# Patient Record
Sex: MALE | Race: WHITE | Employment: OTHER | URBAN - METROPOLITAN AREA
[De-identification: names, ages, dates, MRNs, and addresses within clinical notes are randomized per-mention and may not be internally consistent; named-entity substitution may affect disease eponyms.]

---

## 2017-01-01 ENCOUNTER — APPOINTMENT (OUTPATIENT)
Dept: GENERAL RADIOLOGY | Age: 67
DRG: 306 | End: 2017-01-01
Attending: THORACIC SURGERY (CARDIOTHORACIC VASCULAR SURGERY)
Payer: MEDICARE

## 2017-01-01 ENCOUNTER — HOSPITAL ENCOUNTER (INPATIENT)
Age: 67
LOS: 7 days | Discharge: HOME | DRG: 306 | End: 2017-10-06
Attending: INTERNAL MEDICINE | Admitting: INTERNAL MEDICINE
Payer: MEDICARE

## 2017-01-01 ENCOUNTER — APPOINTMENT (OUTPATIENT)
Dept: CT IMAGING | Age: 67
DRG: 306 | End: 2017-01-01
Attending: THORACIC SURGERY (CARDIOTHORACIC VASCULAR SURGERY)
Payer: MEDICARE

## 2017-01-01 ENCOUNTER — HOSPITAL ENCOUNTER (OUTPATIENT)
Dept: CARDIAC CATH/INVASIVE PROCEDURES | Age: 67
Setting detail: OBSERVATION
Discharge: ANOTHER HOSPITAL | End: 2017-09-28
Attending: INTERNAL MEDICINE | Admitting: INTERNAL MEDICINE
Payer: MEDICARE

## 2017-01-01 VITALS
TEMPERATURE: 98.9 F | RESPIRATION RATE: 20 BRPM | DIASTOLIC BLOOD PRESSURE: 99 MMHG | HEART RATE: 98 BPM | SYSTOLIC BLOOD PRESSURE: 141 MMHG | WEIGHT: 315 LBS | HEIGHT: 71 IN | BODY MASS INDEX: 44.1 KG/M2 | OXYGEN SATURATION: 98 %

## 2017-01-01 VITALS
OXYGEN SATURATION: 91 % | TEMPERATURE: 98.3 F | DIASTOLIC BLOOD PRESSURE: 81 MMHG | BODY MASS INDEX: 44.1 KG/M2 | WEIGHT: 315 LBS | HEIGHT: 71 IN | HEART RATE: 88 BPM | SYSTOLIC BLOOD PRESSURE: 116 MMHG | RESPIRATION RATE: 20 BRPM

## 2017-01-01 LAB
ACT BLD: 212 SECS (ref 79–138)
ALBUMIN SERPL-MCNC: 3.5 G/DL (ref 3.4–5)
ALBUMIN/GLOB SERPL: 1.2 {RATIO} (ref 0.8–1.7)
ALP SERPL-CCNC: 96 U/L (ref 45–117)
ALT SERPL-CCNC: 34 U/L (ref 16–61)
ANION GAP SERPL CALC-SCNC: 6 MMOL/L (ref 3–18)
ANION GAP SERPL CALC-SCNC: 6 MMOL/L (ref 3–18)
ANION GAP SERPL CALC-SCNC: 7 MMOL/L (ref 3–18)
ANION GAP SERPL CALC-SCNC: 7 MMOL/L (ref 3–18)
ANION GAP SERPL CALC-SCNC: 8 MMOL/L (ref 3–18)
ANION GAP SERPL CALC-SCNC: 9 MMOL/L (ref 3–18)
APPEARANCE UR: CLEAR
ARTERIAL PATENCY WRIST A: YES
AST SERPL-CCNC: 38 U/L (ref 15–37)
ATRIAL RATE: 91 BPM
BASE EXCESS BLD CALC-SCNC: 6 MMOL/L
BASOPHILS # BLD: 0 K/UL (ref 0–0.06)
BASOPHILS # BLD: 0 K/UL (ref 0–0.1)
BASOPHILS NFR BLD: 0 % (ref 0–2)
BASOPHILS NFR BLD: 0 % (ref 0–2)
BASOPHILS NFR BLD: 1 % (ref 0–2)
BASOPHILS NFR BLD: 1 % (ref 0–2)
BDY SITE: ABNORMAL
BILIRUB SERPL-MCNC: 3.3 MG/DL (ref 0.2–1)
BILIRUB UR QL: NEGATIVE
BODY TEMPERATURE: 35.9
BUN SERPL-MCNC: 19 MG/DL (ref 7–18)
BUN SERPL-MCNC: 20 MG/DL (ref 7–18)
BUN SERPL-MCNC: 22 MG/DL (ref 7–18)
BUN SERPL-MCNC: 22 MG/DL (ref 7–18)
BUN SERPL-MCNC: 23 MG/DL (ref 7–18)
BUN SERPL-MCNC: 24 MG/DL (ref 7–18)
BUN/CREAT SERPL: 14 (ref 12–20)
BUN/CREAT SERPL: 18 (ref 12–20)
BUN/CREAT SERPL: 19 (ref 12–20)
BUN/CREAT SERPL: 21 (ref 12–20)
BUN/CREAT SERPL: 22 (ref 12–20)
BUN/CREAT SERPL: 22 (ref 12–20)
BUN/CREAT SERPL: 23 (ref 12–20)
BUN/CREAT SERPL: 23 (ref 12–20)
CALCIUM SERPL-MCNC: 8.4 MG/DL (ref 8.5–10.1)
CALCIUM SERPL-MCNC: 8.5 MG/DL (ref 8.5–10.1)
CALCIUM SERPL-MCNC: 8.8 MG/DL (ref 8.5–10.1)
CALCIUM SERPL-MCNC: 8.9 MG/DL (ref 8.5–10.1)
CALCIUM SERPL-MCNC: 8.9 MG/DL (ref 8.5–10.1)
CALCIUM SERPL-MCNC: 9 MG/DL (ref 8.5–10.1)
CALCIUM SERPL-MCNC: 9.1 MG/DL (ref 8.5–10.1)
CALCIUM SERPL-MCNC: 9.2 MG/DL (ref 8.5–10.1)
CALCULATED R AXIS, ECG10: -7 DEGREES
CALCULATED T AXIS, ECG11: 110 DEGREES
CHLORIDE SERPL-SCNC: 100 MMOL/L (ref 100–108)
CHLORIDE SERPL-SCNC: 103 MMOL/L (ref 100–108)
CHLORIDE SERPL-SCNC: 97 MMOL/L (ref 100–108)
CHLORIDE SERPL-SCNC: 99 MMOL/L (ref 100–108)
CHLORIDE SERPL-SCNC: 99 MMOL/L (ref 100–108)
CO2 SERPL-SCNC: 29 MMOL/L (ref 21–32)
CO2 SERPL-SCNC: 30 MMOL/L (ref 21–32)
CO2 SERPL-SCNC: 30 MMOL/L (ref 21–32)
CO2 SERPL-SCNC: 31 MMOL/L (ref 21–32)
CO2 SERPL-SCNC: 31 MMOL/L (ref 21–32)
CO2 SERPL-SCNC: 32 MMOL/L (ref 21–32)
COLOR UR: ABNORMAL
CREAT SERPL-MCNC: 0.96 MG/DL (ref 0.6–1.3)
CREAT SERPL-MCNC: 0.98 MG/DL (ref 0.6–1.3)
CREAT SERPL-MCNC: 1.01 MG/DL (ref 0.6–1.3)
CREAT SERPL-MCNC: 1.04 MG/DL (ref 0.6–1.3)
CREAT SERPL-MCNC: 1.05 MG/DL (ref 0.6–1.3)
CREAT SERPL-MCNC: 1.07 MG/DL (ref 0.6–1.3)
CREAT SERPL-MCNC: 1.3 MG/DL (ref 0.6–1.3)
CREAT SERPL-MCNC: 1.4 MG/DL (ref 0.6–1.3)
DIAGNOSIS, 93000: NORMAL
DIFFERENTIAL METHOD BLD: ABNORMAL
EOSINOPHIL # BLD: 0.1 K/UL (ref 0–0.4)
EOSINOPHIL # BLD: 0.2 K/UL (ref 0–0.4)
EOSINOPHIL NFR BLD: 1 % (ref 0–5)
EOSINOPHIL NFR BLD: 2 % (ref 0–5)
EPITH CASTS URNS QL MICRO: NORMAL /LPF (ref 0–5)
ERYTHROCYTE [DISTWIDTH] IN BLOOD BY AUTOMATED COUNT: 14.4 % (ref 11.6–14.5)
ERYTHROCYTE [DISTWIDTH] IN BLOOD BY AUTOMATED COUNT: 14.6 % (ref 11.6–14.5)
ERYTHROCYTE [DISTWIDTH] IN BLOOD BY AUTOMATED COUNT: 14.7 % (ref 11.6–14.5)
ERYTHROCYTE [DISTWIDTH] IN BLOOD BY AUTOMATED COUNT: 14.7 % (ref 11.6–14.5)
ERYTHROCYTE [DISTWIDTH] IN BLOOD BY AUTOMATED COUNT: 15.1 % (ref 11.6–14.5)
EST. AVERAGE GLUCOSE BLD GHB EST-MCNC: 131 MG/DL
GAS FLOW.O2 O2 DELIVERY SYS: ABNORMAL L/MIN
GLOBULIN SER CALC-MCNC: 2.9 G/DL (ref 2–4)
GLUCOSE BLD STRIP.AUTO-MCNC: 100 MG/DL (ref 70–110)
GLUCOSE BLD STRIP.AUTO-MCNC: 102 MG/DL (ref 70–110)
GLUCOSE BLD STRIP.AUTO-MCNC: 105 MG/DL (ref 70–110)
GLUCOSE BLD STRIP.AUTO-MCNC: 106 MG/DL (ref 70–110)
GLUCOSE BLD STRIP.AUTO-MCNC: 106 MG/DL (ref 70–110)
GLUCOSE BLD STRIP.AUTO-MCNC: 108 MG/DL (ref 70–110)
GLUCOSE BLD STRIP.AUTO-MCNC: 109 MG/DL (ref 70–110)
GLUCOSE BLD STRIP.AUTO-MCNC: 111 MG/DL (ref 70–110)
GLUCOSE BLD STRIP.AUTO-MCNC: 112 MG/DL (ref 70–110)
GLUCOSE BLD STRIP.AUTO-MCNC: 116 MG/DL (ref 70–110)
GLUCOSE BLD STRIP.AUTO-MCNC: 116 MG/DL (ref 70–110)
GLUCOSE BLD STRIP.AUTO-MCNC: 118 MG/DL (ref 70–110)
GLUCOSE BLD STRIP.AUTO-MCNC: 119 MG/DL (ref 70–110)
GLUCOSE BLD STRIP.AUTO-MCNC: 121 MG/DL (ref 70–110)
GLUCOSE BLD STRIP.AUTO-MCNC: 122 MG/DL (ref 70–110)
GLUCOSE BLD STRIP.AUTO-MCNC: 123 MG/DL (ref 70–110)
GLUCOSE BLD STRIP.AUTO-MCNC: 123 MG/DL (ref 70–110)
GLUCOSE BLD STRIP.AUTO-MCNC: 128 MG/DL (ref 70–110)
GLUCOSE BLD STRIP.AUTO-MCNC: 128 MG/DL (ref 70–110)
GLUCOSE BLD STRIP.AUTO-MCNC: 131 MG/DL (ref 70–110)
GLUCOSE BLD STRIP.AUTO-MCNC: 132 MG/DL (ref 70–110)
GLUCOSE BLD STRIP.AUTO-MCNC: 136 MG/DL (ref 70–110)
GLUCOSE BLD STRIP.AUTO-MCNC: 136 MG/DL (ref 70–110)
GLUCOSE BLD STRIP.AUTO-MCNC: 137 MG/DL (ref 70–110)
GLUCOSE BLD STRIP.AUTO-MCNC: 137 MG/DL (ref 70–110)
GLUCOSE BLD STRIP.AUTO-MCNC: 151 MG/DL (ref 70–110)
GLUCOSE BLD STRIP.AUTO-MCNC: 171 MG/DL (ref 70–110)
GLUCOSE BLD STRIP.AUTO-MCNC: 87 MG/DL (ref 70–110)
GLUCOSE BLD STRIP.AUTO-MCNC: 96 MG/DL (ref 70–110)
GLUCOSE BLD STRIP.AUTO-MCNC: 98 MG/DL (ref 70–110)
GLUCOSE BLD STRIP.AUTO-MCNC: 99 MG/DL (ref 70–110)
GLUCOSE SERPL-MCNC: 102 MG/DL (ref 74–99)
GLUCOSE SERPL-MCNC: 105 MG/DL (ref 74–99)
GLUCOSE SERPL-MCNC: 112 MG/DL (ref 74–99)
GLUCOSE SERPL-MCNC: 113 MG/DL (ref 74–99)
GLUCOSE SERPL-MCNC: 127 MG/DL (ref 74–99)
GLUCOSE SERPL-MCNC: 137 MG/DL (ref 74–99)
GLUCOSE SERPL-MCNC: 150 MG/DL (ref 74–99)
GLUCOSE SERPL-MCNC: 94 MG/DL (ref 74–99)
GLUCOSE UR STRIP.AUTO-MCNC: NEGATIVE MG/DL
HBA1C MFR BLD: 6.2 % (ref 4.2–5.6)
HBA1C MFR BLD: 6.2 % (ref 4.2–5.6)
HCO3 BLD-SCNC: 30 MMOL/L (ref 22–26)
HCT VFR BLD AUTO: 40.6 % (ref 36–48)
HCT VFR BLD AUTO: 40.9 % (ref 36–48)
HCT VFR BLD AUTO: 41.1 % (ref 36–48)
HCT VFR BLD AUTO: 43.8 % (ref 36–48)
HCT VFR BLD AUTO: 44.4 % (ref 36–48)
HGB BLD-MCNC: 13.1 G/DL (ref 13–16)
HGB BLD-MCNC: 13.5 G/DL (ref 13–16)
HGB BLD-MCNC: 13.7 G/DL (ref 13–16)
HGB BLD-MCNC: 14.3 G/DL (ref 13–16)
HGB BLD-MCNC: 14.8 G/DL (ref 13–16)
HGB UR QL STRIP: ABNORMAL
INR PPP: 1.7 (ref 0.8–1.2)
KETONES UR QL STRIP.AUTO: NEGATIVE MG/DL
LEUKOCYTE ESTERASE UR QL STRIP.AUTO: ABNORMAL
LYMPHOCYTES # BLD: 1.5 K/UL (ref 0.9–3.6)
LYMPHOCYTES # BLD: 1.5 K/UL (ref 0.9–3.6)
LYMPHOCYTES # BLD: 1.7 K/UL (ref 0.9–3.6)
LYMPHOCYTES # BLD: 1.7 K/UL (ref 0.9–3.6)
LYMPHOCYTES NFR BLD: 21 % (ref 21–52)
LYMPHOCYTES NFR BLD: 22 % (ref 21–52)
LYMPHOCYTES NFR BLD: 23 % (ref 21–52)
LYMPHOCYTES NFR BLD: 26 % (ref 21–52)
MCH RBC QN AUTO: 32 PG (ref 24–34)
MCH RBC QN AUTO: 32.5 PG (ref 24–34)
MCH RBC QN AUTO: 32.6 PG (ref 24–34)
MCH RBC QN AUTO: 32.6 PG (ref 24–34)
MCH RBC QN AUTO: 33 PG (ref 24–34)
MCHC RBC AUTO-ENTMCNC: 32.2 G/DL (ref 31–37)
MCHC RBC AUTO-ENTMCNC: 32.3 G/DL (ref 31–37)
MCHC RBC AUTO-ENTMCNC: 33 G/DL (ref 31–37)
MCHC RBC AUTO-ENTMCNC: 33.3 G/DL (ref 31–37)
MCHC RBC AUTO-ENTMCNC: 33.8 G/DL (ref 31–37)
MCV RBC AUTO: 101.1 FL (ref 74–97)
MCV RBC AUTO: 96.3 FL (ref 74–97)
MCV RBC AUTO: 98.8 FL (ref 74–97)
MCV RBC AUTO: 99 FL (ref 74–97)
MCV RBC AUTO: 99 FL (ref 74–97)
MONOCYTES # BLD: 0.6 K/UL (ref 0.05–1.2)
MONOCYTES # BLD: 0.9 K/UL (ref 0.05–1.2)
MONOCYTES # BLD: 1 K/UL (ref 0.05–1.2)
MONOCYTES # BLD: 1 K/UL (ref 0.05–1.2)
MONOCYTES NFR BLD: 10 % (ref 3–10)
MONOCYTES NFR BLD: 12 % (ref 3–10)
MONOCYTES NFR BLD: 13 % (ref 3–10)
MONOCYTES NFR BLD: 15 % (ref 3–10)
NEUTS SEG # BLD: 3.5 K/UL (ref 1.8–8)
NEUTS SEG # BLD: 4.4 K/UL (ref 1.8–8)
NEUTS SEG # BLD: 4.4 K/UL (ref 1.8–8)
NEUTS SEG # BLD: 4.7 K/UL (ref 1.8–8)
NEUTS SEG NFR BLD: 61 % (ref 40–73)
NEUTS SEG NFR BLD: 61 % (ref 40–73)
NEUTS SEG NFR BLD: 63 % (ref 40–73)
NEUTS SEG NFR BLD: 64 % (ref 40–73)
NITRITE UR QL STRIP.AUTO: NEGATIVE
O2/TOTAL GAS SETTING VFR VENT: 21 %
PCO2 BLD: 39.9 MMHG (ref 35–45)
PH BLD: 7.48 [PH] (ref 7.35–7.45)
PH UR STRIP: 6.5 [PH] (ref 5–8)
PLATELET # BLD AUTO: 100 K/UL (ref 135–420)
PLATELET # BLD AUTO: 102 K/UL (ref 135–420)
PLATELET # BLD AUTO: 113 K/UL (ref 135–420)
PLATELET # BLD AUTO: 130 K/UL (ref 135–420)
PLATELET # BLD AUTO: 96 K/UL (ref 135–420)
PMV BLD AUTO: 11.1 FL (ref 9.2–11.8)
PMV BLD AUTO: 11.3 FL (ref 9.2–11.8)
PMV BLD AUTO: 11.7 FL (ref 9.2–11.8)
PMV BLD AUTO: 11.9 FL (ref 9.2–11.8)
PMV BLD AUTO: 12.4 FL (ref 9.2–11.8)
PO2 BLD: 61 MMHG (ref 80–100)
POTASSIUM SERPL-SCNC: 3.6 MMOL/L (ref 3.5–5.5)
POTASSIUM SERPL-SCNC: 3.9 MMOL/L (ref 3.5–5.5)
POTASSIUM SERPL-SCNC: 4 MMOL/L (ref 3.5–5.5)
POTASSIUM SERPL-SCNC: 4.2 MMOL/L (ref 3.5–5.5)
POTASSIUM SERPL-SCNC: 4.7 MMOL/L (ref 3.5–5.5)
PROT SERPL-MCNC: 6.4 G/DL (ref 6.4–8.2)
PROT UR STRIP-MCNC: 100 MG/DL
PROTHROMBIN TIME: 19.2 SEC (ref 11.5–15.2)
Q-T INTERVAL, ECG07: 364 MS
QRS DURATION, ECG06: 108 MS
QTC CALCULATION (BEZET), ECG08: 455 MS
RBC # BLD AUTO: 4.1 M/UL (ref 4.7–5.5)
RBC # BLD AUTO: 4.14 M/UL (ref 4.7–5.5)
RBC # BLD AUTO: 4.15 M/UL (ref 4.7–5.5)
RBC # BLD AUTO: 4.39 M/UL (ref 4.7–5.5)
RBC # BLD AUTO: 4.55 M/UL (ref 4.7–5.5)
RBC #/AREA URNS HPF: NORMAL /HPF (ref 0–5)
SAO2 % BLD: 94 % (ref 92–97)
SERVICE CMNT-IMP: ABNORMAL
SODIUM SERPL-SCNC: 136 MMOL/L (ref 136–145)
SODIUM SERPL-SCNC: 137 MMOL/L (ref 136–145)
SODIUM SERPL-SCNC: 138 MMOL/L (ref 136–145)
SODIUM SERPL-SCNC: 139 MMOL/L (ref 136–145)
SODIUM SERPL-SCNC: 140 MMOL/L (ref 136–145)
SODIUM SERPL-SCNC: 140 MMOL/L (ref 136–145)
SODIUM SERPL-SCNC: 141 MMOL/L (ref 136–145)
SODIUM SERPL-SCNC: 142 MMOL/L (ref 136–145)
SP GR UR REFRACTOMETRY: 1.02 (ref 1–1.03)
SPECIMEN TYPE: ABNORMAL
UROBILINOGEN UR QL STRIP.AUTO: 2 EU/DL (ref 0.2–1)
VENTRICULAR RATE, ECG03: 94 BPM
WBC # BLD AUTO: 5.7 K/UL (ref 4.6–13.2)
WBC # BLD AUTO: 6.2 K/UL (ref 4.6–13.2)
WBC # BLD AUTO: 7.1 K/UL (ref 4.6–13.2)
WBC # BLD AUTO: 7.2 K/UL (ref 4.6–13.2)
WBC # BLD AUTO: 7.4 K/UL (ref 4.6–13.2)
WBC URNS QL MICRO: NORMAL /HPF (ref 0–4)

## 2017-01-01 PROCEDURE — 77030004534 CARDIAC CATHETERIZATION

## 2017-01-01 PROCEDURE — 65660000000 HC RM CCU STEPDOWN

## 2017-01-01 PROCEDURE — 74011000250 HC RX REV CODE- 250

## 2017-01-01 PROCEDURE — 36600 WITHDRAWAL OF ARTERIAL BLOOD: CPT

## 2017-01-01 PROCEDURE — C8929 TTE W OR WO FOL WCON,DOPPLER: HCPCS

## 2017-01-01 PROCEDURE — 94640 AIRWAY INHALATION TREATMENT: CPT

## 2017-01-01 PROCEDURE — 65270000029 HC RM PRIVATE

## 2017-01-01 PROCEDURE — 74011250637 HC RX REV CODE- 250/637: Performed by: THORACIC SURGERY (CARDIOTHORACIC VASCULAR SURGERY)

## 2017-01-01 PROCEDURE — 82962 GLUCOSE BLOOD TEST: CPT

## 2017-01-01 PROCEDURE — 94729 DIFFUSING CAPACITY: CPT

## 2017-01-01 PROCEDURE — 74011250636 HC RX REV CODE- 250/636: Performed by: INTERNAL MEDICINE

## 2017-01-01 PROCEDURE — 94060 EVALUATION OF WHEEZING: CPT

## 2017-01-01 PROCEDURE — 74011636637 HC RX REV CODE- 636/637: Performed by: INTERNAL MEDICINE

## 2017-01-01 PROCEDURE — 74011250637 HC RX REV CODE- 250/637: Performed by: INTERNAL MEDICINE

## 2017-01-01 PROCEDURE — 93880 EXTRACRANIAL BILAT STUDY: CPT

## 2017-01-01 PROCEDURE — 74011250637 HC RX REV CODE- 250/637: Performed by: FAMILY MEDICINE

## 2017-01-01 PROCEDURE — 36415 COLL VENOUS BLD VENIPUNCTURE: CPT | Performed by: INTERNAL MEDICINE

## 2017-01-01 PROCEDURE — 80048 BASIC METABOLIC PNL TOTAL CA: CPT | Performed by: INTERNAL MEDICINE

## 2017-01-01 PROCEDURE — 80048 BASIC METABOLIC PNL TOTAL CA: CPT | Performed by: THORACIC SURGERY (CARDIOTHORACIC VASCULAR SURGERY)

## 2017-01-01 PROCEDURE — 99218 HC RM OBSERVATION: CPT

## 2017-01-01 PROCEDURE — 85025 COMPLETE CBC W/AUTO DIFF WBC: CPT | Performed by: FAMILY MEDICINE

## 2017-01-01 PROCEDURE — 80048 BASIC METABOLIC PNL TOTAL CA: CPT | Performed by: FAMILY MEDICINE

## 2017-01-01 PROCEDURE — 74011250636 HC RX REV CODE- 250/636

## 2017-01-01 PROCEDURE — 94727 GAS DIL/WSHOT DETER LNG VOL: CPT

## 2017-01-01 PROCEDURE — 85027 COMPLETE CBC AUTOMATED: CPT | Performed by: INTERNAL MEDICINE

## 2017-01-01 PROCEDURE — 93970 EXTREMITY STUDY: CPT

## 2017-01-01 PROCEDURE — 81001 URINALYSIS AUTO W/SCOPE: CPT | Performed by: INTERNAL MEDICINE

## 2017-01-01 PROCEDURE — 74011000250 HC RX REV CODE- 250: Performed by: INTERNAL MEDICINE

## 2017-01-01 PROCEDURE — 83036 HEMOGLOBIN GLYCOSYLATED A1C: CPT | Performed by: INTERNAL MEDICINE

## 2017-01-01 PROCEDURE — 74011636320 HC RX REV CODE- 636/320: Performed by: INTERNAL MEDICINE

## 2017-01-01 PROCEDURE — 82803 BLOOD GASES ANY COMBINATION: CPT

## 2017-01-01 PROCEDURE — 36415 COLL VENOUS BLD VENIPUNCTURE: CPT | Performed by: THORACIC SURGERY (CARDIOTHORACIC VASCULAR SURGERY)

## 2017-01-01 PROCEDURE — 36415 COLL VENOUS BLD VENIPUNCTURE: CPT | Performed by: FAMILY MEDICINE

## 2017-01-01 PROCEDURE — 80053 COMPREHEN METABOLIC PANEL: CPT | Performed by: THORACIC SURGERY (CARDIOTHORACIC VASCULAR SURGERY)

## 2017-01-01 PROCEDURE — 85025 COMPLETE CBC W/AUTO DIFF WBC: CPT | Performed by: INTERNAL MEDICINE

## 2017-01-01 PROCEDURE — 85610 PROTHROMBIN TIME: CPT | Performed by: INTERNAL MEDICINE

## 2017-01-01 PROCEDURE — 71020 XR CHEST PA LAT: CPT

## 2017-01-01 PROCEDURE — 71260 CT THORAX DX C+: CPT

## 2017-01-01 PROCEDURE — 93005 ELECTROCARDIOGRAM TRACING: CPT

## 2017-01-01 PROCEDURE — 83036 HEMOGLOBIN GLYCOSYLATED A1C: CPT | Performed by: THORACIC SURGERY (CARDIOTHORACIC VASCULAR SURGERY)

## 2017-01-01 PROCEDURE — 85347 COAGULATION TIME ACTIVATED: CPT

## 2017-01-01 RX ORDER — HEPARIN SODIUM 200 [USP'U]/100ML
INJECTION, SOLUTION INTRAVENOUS
Status: DISPENSED
Start: 2017-01-01 | End: 2017-01-01

## 2017-01-01 RX ORDER — CARVEDILOL 12.5 MG/1
12.5 TABLET ORAL 2 TIMES DAILY WITH MEALS
Qty: 60 TAB | Refills: 2 | Status: SHIPPED | OUTPATIENT
Start: 2017-01-01

## 2017-01-01 RX ORDER — ASPIRIN 81 MG/1
81 TABLET ORAL DAILY
Status: DISCONTINUED | OUTPATIENT
Start: 2017-01-01 | End: 2017-01-01 | Stop reason: HOSPADM

## 2017-01-01 RX ORDER — ASPIRIN 81 MG/1
81 TABLET ORAL DAILY
Qty: 30 TAB | Refills: 1 | Status: SHIPPED | OUTPATIENT
Start: 2017-01-01

## 2017-01-01 RX ORDER — GUAIFENESIN 100 MG/5ML
81 LIQUID (ML) ORAL DAILY
Status: DISCONTINUED | OUTPATIENT
Start: 2017-01-01 | End: 2017-01-01 | Stop reason: HOSPADM

## 2017-01-01 RX ORDER — GABAPENTIN 600 MG/1
600 TABLET ORAL 3 TIMES DAILY
COMMUNITY

## 2017-01-01 RX ORDER — DEXTROSE 50 % IN WATER (D50W) INTRAVENOUS SYRINGE
25-50 AS NEEDED
Status: DISCONTINUED | OUTPATIENT
Start: 2017-01-01 | End: 2017-01-01 | Stop reason: HOSPADM

## 2017-01-01 RX ORDER — ENOXAPARIN SODIUM 150 MG/ML
145 INJECTION SUBCUTANEOUS EVERY 12 HOURS
Status: DISCONTINUED | OUTPATIENT
Start: 2017-01-01 | End: 2017-01-01 | Stop reason: HOSPADM

## 2017-01-01 RX ORDER — CARVEDILOL 12.5 MG/1
12.5 TABLET ORAL 2 TIMES DAILY WITH MEALS
Status: DISCONTINUED | OUTPATIENT
Start: 2017-01-01 | End: 2017-01-01 | Stop reason: HOSPADM

## 2017-01-01 RX ORDER — FENTANYL CITRATE 50 UG/ML
INJECTION, SOLUTION INTRAMUSCULAR; INTRAVENOUS
Status: COMPLETED
Start: 2017-01-01 | End: 2017-01-01

## 2017-01-01 RX ORDER — HYDRALAZINE HYDROCHLORIDE 20 MG/ML
10 INJECTION INTRAMUSCULAR; INTRAVENOUS
Status: DISCONTINUED | OUTPATIENT
Start: 2017-01-01 | End: 2017-01-01 | Stop reason: HOSPADM

## 2017-01-01 RX ORDER — LIDOCAINE HYDROCHLORIDE 10 MG/ML
INJECTION INFILTRATION; PERINEURAL
Status: COMPLETED
Start: 2017-01-01 | End: 2017-01-01

## 2017-01-01 RX ORDER — WARFARIN SODIUM 5 MG/1
7.5 TABLET ORAL DAILY
COMMUNITY

## 2017-01-01 RX ORDER — FUROSEMIDE 20 MG/1
TABLET ORAL
COMMUNITY
End: 2017-01-01

## 2017-01-01 RX ORDER — TRAMADOL HYDROCHLORIDE 50 MG/1
50 TABLET ORAL
Status: COMPLETED | OUTPATIENT
Start: 2017-01-01 | End: 2017-01-01

## 2017-01-01 RX ORDER — LISINOPRIL 5 MG/1
10 TABLET ORAL DAILY
Status: DISCONTINUED | OUTPATIENT
Start: 2017-01-01 | End: 2017-01-01 | Stop reason: HOSPADM

## 2017-01-01 RX ORDER — HYDRALAZINE HYDROCHLORIDE 20 MG/ML
10-20 INJECTION INTRAMUSCULAR; INTRAVENOUS
Status: DISCONTINUED | OUTPATIENT
Start: 2017-01-01 | End: 2017-01-01 | Stop reason: SDUPTHER

## 2017-01-01 RX ORDER — GABAPENTIN 300 MG/1
600 CAPSULE ORAL 3 TIMES DAILY
Status: DISCONTINUED | OUTPATIENT
Start: 2017-01-01 | End: 2017-01-01

## 2017-01-01 RX ORDER — HEPARIN SODIUM 200 [USP'U]/100ML
INJECTION, SOLUTION INTRAVENOUS
Status: COMPLETED
Start: 2017-01-01 | End: 2017-01-01

## 2017-01-01 RX ORDER — FUROSEMIDE 40 MG/1
40 TABLET ORAL 2 TIMES DAILY
Status: DISCONTINUED | OUTPATIENT
Start: 2017-01-01 | End: 2017-01-01

## 2017-01-01 RX ORDER — TRAMADOL HYDROCHLORIDE 50 MG/1
50 TABLET ORAL
Status: DISCONTINUED | OUTPATIENT
Start: 2017-01-01 | End: 2017-01-01

## 2017-01-01 RX ORDER — HYDRALAZINE HYDROCHLORIDE 20 MG/ML
10-20 INJECTION INTRAMUSCULAR; INTRAVENOUS
Status: DISCONTINUED | OUTPATIENT
Start: 2017-01-01 | End: 2017-01-01 | Stop reason: HOSPADM

## 2017-01-01 RX ORDER — SODIUM CHLORIDE 0.9 % (FLUSH) 0.9 %
5-10 SYRINGE (ML) INJECTION EVERY 8 HOURS
Status: CANCELLED | OUTPATIENT
Start: 2017-01-01

## 2017-01-01 RX ORDER — POTASSIUM CHLORIDE 1.5 G/1.77G
20 POWDER, FOR SOLUTION ORAL
Status: DISCONTINUED | OUTPATIENT
Start: 2017-01-01 | End: 2017-01-01 | Stop reason: HOSPADM

## 2017-01-01 RX ORDER — SODIUM CHLORIDE 0.9 % (FLUSH) 0.9 %
5-10 SYRINGE (ML) INJECTION AS NEEDED
Status: DISCONTINUED | OUTPATIENT
Start: 2017-01-01 | End: 2017-01-01 | Stop reason: HOSPADM

## 2017-01-01 RX ORDER — PRAVASTATIN SODIUM 20 MG/1
40 TABLET ORAL
Status: DISCONTINUED | OUTPATIENT
Start: 2017-01-01 | End: 2017-01-01 | Stop reason: HOSPADM

## 2017-01-01 RX ORDER — FUROSEMIDE 40 MG/1
80 TABLET ORAL 2 TIMES DAILY
Qty: 120 TAB | Refills: 2 | Status: SHIPPED | OUTPATIENT
Start: 2017-01-01 | End: 2018-01-01

## 2017-01-01 RX ORDER — METOPROLOL TARTRATE 5 MG/5ML
INJECTION INTRAVENOUS
Status: COMPLETED
Start: 2017-01-01 | End: 2017-01-01

## 2017-01-01 RX ORDER — FENTANYL CITRATE 50 UG/ML
25-100 INJECTION, SOLUTION INTRAMUSCULAR; INTRAVENOUS
Status: DISCONTINUED | OUTPATIENT
Start: 2017-01-01 | End: 2017-01-01 | Stop reason: HOSPADM

## 2017-01-01 RX ORDER — LORATADINE 10 MG/1
10 TABLET ORAL DAILY
Status: DISCONTINUED | OUTPATIENT
Start: 2017-01-01 | End: 2017-01-01 | Stop reason: HOSPADM

## 2017-01-01 RX ORDER — DOXYCYCLINE HYCLATE 20 MG
20 TABLET ORAL 2 TIMES DAILY
COMMUNITY
End: 2017-01-01

## 2017-01-01 RX ORDER — METOPROLOL TARTRATE 5 MG/5ML
5 INJECTION INTRAVENOUS
Status: DISCONTINUED | OUTPATIENT
Start: 2017-01-01 | End: 2017-01-01 | Stop reason: HOSPADM

## 2017-01-01 RX ORDER — VERAPAMIL HYDROCHLORIDE 2.5 MG/ML
INJECTION, SOLUTION INTRAVENOUS
Status: DISPENSED
Start: 2017-01-01 | End: 2017-01-01

## 2017-01-01 RX ORDER — SODIUM CHLORIDE 9 MG/ML
50 INJECTION, SOLUTION INTRAVENOUS CONTINUOUS
Status: DISCONTINUED | OUTPATIENT
Start: 2017-01-01 | End: 2017-01-01 | Stop reason: HOSPADM

## 2017-01-01 RX ORDER — HYDRALAZINE HYDROCHLORIDE 20 MG/ML
INJECTION INTRAMUSCULAR; INTRAVENOUS
Status: COMPLETED
Start: 2017-01-01 | End: 2017-01-01

## 2017-01-01 RX ORDER — TRAMADOL HYDROCHLORIDE 50 MG/1
50 TABLET ORAL
Status: DISCONTINUED | OUTPATIENT
Start: 2017-01-01 | End: 2017-01-01 | Stop reason: HOSPADM

## 2017-01-01 RX ORDER — LIDOCAINE HYDROCHLORIDE 10 MG/ML
10-30 INJECTION INFILTRATION; PERINEURAL
Status: DISCONTINUED | OUTPATIENT
Start: 2017-01-01 | End: 2017-01-01 | Stop reason: HOSPADM

## 2017-01-01 RX ORDER — CARVEDILOL 6.25 MG/1
6.25 TABLET ORAL 2 TIMES DAILY WITH MEALS
Status: DISCONTINUED | OUTPATIENT
Start: 2017-01-01 | End: 2017-01-01

## 2017-01-01 RX ORDER — HEPARIN SODIUM 200 [USP'U]/100ML
500 INJECTION, SOLUTION INTRAVENOUS
Status: DISCONTINUED | OUTPATIENT
Start: 2017-01-01 | End: 2017-01-01 | Stop reason: HOSPADM

## 2017-01-01 RX ORDER — PRAVASTATIN SODIUM 40 MG/1
40 TABLET ORAL
Qty: 30 TAB | Refills: 2 | Status: SHIPPED | OUTPATIENT
Start: 2017-01-01

## 2017-01-01 RX ORDER — CARVEDILOL 3.12 MG/1
3.12 TABLET ORAL 2 TIMES DAILY WITH MEALS
Status: DISCONTINUED | OUTPATIENT
Start: 2017-01-01 | End: 2017-01-01 | Stop reason: HOSPADM

## 2017-01-01 RX ORDER — INSULIN LISPRO 100 [IU]/ML
INJECTION, SOLUTION INTRAVENOUS; SUBCUTANEOUS
Status: DISCONTINUED | OUTPATIENT
Start: 2017-01-01 | End: 2017-01-01 | Stop reason: HOSPADM

## 2017-01-01 RX ORDER — SODIUM CHLORIDE 0.9 % (FLUSH) 0.9 %
5-10 SYRINGE (ML) INJECTION EVERY 8 HOURS
Status: DISCONTINUED | OUTPATIENT
Start: 2017-01-01 | End: 2017-01-01 | Stop reason: HOSPADM

## 2017-01-01 RX ORDER — TRAZODONE HYDROCHLORIDE 50 MG/1
50 TABLET ORAL
COMMUNITY
End: 2017-01-01

## 2017-01-01 RX ORDER — MIDAZOLAM HYDROCHLORIDE 1 MG/ML
.5-2 INJECTION, SOLUTION INTRAMUSCULAR; INTRAVENOUS
Status: DISCONTINUED | OUTPATIENT
Start: 2017-01-01 | End: 2017-01-01 | Stop reason: HOSPADM

## 2017-01-01 RX ORDER — FUROSEMIDE 10 MG/ML
60 INJECTION INTRAMUSCULAR; INTRAVENOUS 2 TIMES DAILY
Status: DISCONTINUED | OUTPATIENT
Start: 2017-01-01 | End: 2017-01-01

## 2017-01-01 RX ORDER — MIDAZOLAM HYDROCHLORIDE 1 MG/ML
INJECTION, SOLUTION INTRAMUSCULAR; INTRAVENOUS
Status: COMPLETED
Start: 2017-01-01 | End: 2017-01-01

## 2017-01-01 RX ORDER — MAGNESIUM SULFATE 100 %
4 CRYSTALS MISCELLANEOUS AS NEEDED
Status: DISCONTINUED | OUTPATIENT
Start: 2017-01-01 | End: 2017-01-01 | Stop reason: HOSPADM

## 2017-01-01 RX ORDER — HEPARIN SODIUM 1000 [USP'U]/ML
4000 INJECTION, SOLUTION INTRAVENOUS; SUBCUTANEOUS
Status: DISCONTINUED | OUTPATIENT
Start: 2017-01-01 | End: 2017-01-01 | Stop reason: HOSPADM

## 2017-01-01 RX ORDER — BIVALIRUDIN 250 MG/5ML
0.75 INJECTION, POWDER, LYOPHILIZED, FOR SOLUTION INTRAVENOUS ONCE
Status: DISCONTINUED | OUTPATIENT
Start: 2017-01-01 | End: 2017-01-01 | Stop reason: HOSPADM

## 2017-01-01 RX ORDER — SODIUM CHLORIDE 0.9 % (FLUSH) 0.9 %
5-10 SYRINGE (ML) INJECTION AS NEEDED
Status: CANCELLED | OUTPATIENT
Start: 2017-01-01

## 2017-01-01 RX ORDER — CARVEDILOL 3.12 MG/1
3.12 TABLET ORAL 2 TIMES DAILY WITH MEALS
Status: DISCONTINUED | OUTPATIENT
Start: 2017-01-01 | End: 2017-01-01

## 2017-01-01 RX ORDER — GABAPENTIN 300 MG/1
600 CAPSULE ORAL
Status: DISCONTINUED | OUTPATIENT
Start: 2017-01-01 | End: 2017-01-01 | Stop reason: HOSPADM

## 2017-01-01 RX ORDER — HYDRALAZINE HYDROCHLORIDE 20 MG/ML
10 INJECTION INTRAMUSCULAR; INTRAVENOUS
Status: DISCONTINUED | OUTPATIENT
Start: 2017-01-01 | End: 2017-01-01 | Stop reason: SDUPTHER

## 2017-01-01 RX ORDER — ONDANSETRON 2 MG/ML
4 INJECTION INTRAMUSCULAR; INTRAVENOUS
Status: DISCONTINUED | OUTPATIENT
Start: 2017-01-01 | End: 2017-01-01 | Stop reason: HOSPADM

## 2017-01-01 RX ORDER — SODIUM CHLORIDE 9 MG/ML
50 INJECTION, SOLUTION INTRAVENOUS CONTINUOUS
Status: DISCONTINUED | OUTPATIENT
Start: 2017-01-01 | End: 2017-01-01

## 2017-01-01 RX ORDER — FUROSEMIDE 10 MG/ML
20 INJECTION INTRAMUSCULAR; INTRAVENOUS ONCE
Status: DISCONTINUED | OUTPATIENT
Start: 2017-01-01 | End: 2017-01-01 | Stop reason: SDUPTHER

## 2017-01-01 RX ORDER — FUROSEMIDE 40 MG/1
80 TABLET ORAL 2 TIMES DAILY
Status: DISCONTINUED | OUTPATIENT
Start: 2017-01-01 | End: 2017-01-01 | Stop reason: HOSPADM

## 2017-01-01 RX ORDER — LORAZEPAM 1 MG/1
1 TABLET ORAL
Status: DISCONTINUED | OUTPATIENT
Start: 2017-01-01 | End: 2017-01-01 | Stop reason: HOSPADM

## 2017-01-01 RX ORDER — POTASSIUM CHLORIDE 1.5 G/1.77G
20 POWDER, FOR SOLUTION ORAL 2 TIMES DAILY WITH MEALS
Status: DISCONTINUED | OUTPATIENT
Start: 2017-01-01 | End: 2017-01-01

## 2017-01-01 RX ORDER — DIPHENHYDRAMINE HCL 25 MG
25 CAPSULE ORAL
Status: DISCONTINUED | OUTPATIENT
Start: 2017-01-01 | End: 2017-01-01 | Stop reason: HOSPADM

## 2017-01-01 RX ORDER — GABAPENTIN 300 MG/1
600 CAPSULE ORAL 3 TIMES DAILY
Status: DISCONTINUED | OUTPATIENT
Start: 2017-01-01 | End: 2017-01-01 | Stop reason: HOSPADM

## 2017-01-01 RX ORDER — HEPARIN SODIUM 1000 [USP'U]/ML
INJECTION, SOLUTION INTRAVENOUS; SUBCUTANEOUS
Status: COMPLETED
Start: 2017-01-01 | End: 2017-01-01

## 2017-01-01 RX ORDER — LISINOPRIL 10 MG/1
10 TABLET ORAL DAILY
Status: DISCONTINUED | OUTPATIENT
Start: 2017-01-01 | End: 2017-01-01

## 2017-01-01 RX ORDER — CETIRIZINE HCL 10 MG
10 TABLET ORAL
COMMUNITY

## 2017-01-01 RX ORDER — LISINOPRIL 10 MG/1
10 TABLET ORAL DAILY
COMMUNITY
End: 2017-01-01

## 2017-01-01 RX ORDER — HEPARIN SODIUM 5000 [USP'U]/ML
5000 INJECTION, SOLUTION INTRAVENOUS; SUBCUTANEOUS EVERY 8 HOURS
Status: DISCONTINUED | OUTPATIENT
Start: 2017-01-01 | End: 2017-01-01

## 2017-01-01 RX ORDER — CARVEDILOL 3.12 MG/1
3.12 TABLET ORAL DAILY
COMMUNITY
End: 2017-01-01

## 2017-01-01 RX ORDER — TRAMADOL HYDROCHLORIDE 50 MG/1
50 TABLET ORAL
Status: DISCONTINUED | OUTPATIENT
Start: 2017-01-01 | End: 2017-01-01 | Stop reason: SDUPTHER

## 2017-01-01 RX ORDER — TRAMADOL HYDROCHLORIDE 50 MG/1
50 TABLET ORAL
Qty: 30 TAB | Refills: 0 | Status: SHIPPED | OUTPATIENT
Start: 2017-01-01

## 2017-01-01 RX ORDER — TRAMADOL HYDROCHLORIDE 50 MG/1
50 TABLET ORAL
COMMUNITY
End: 2017-01-01

## 2017-01-01 RX ADMIN — GABAPENTIN 600 MG: 300 CAPSULE ORAL at 22:47

## 2017-01-01 RX ADMIN — POTASSIUM CHLORIDE 20 MEQ: 1.5 POWDER, FOR SOLUTION ORAL at 10:07

## 2017-01-01 RX ADMIN — MIDAZOLAM HYDROCHLORIDE 0.5 MG: 1 INJECTION, SOLUTION INTRAMUSCULAR; INTRAVENOUS at 11:06

## 2017-01-01 RX ADMIN — GABAPENTIN 600 MG: 300 CAPSULE ORAL at 22:30

## 2017-01-01 RX ADMIN — HEPARIN SODIUM 1000 UNITS: 200 INJECTION, SOLUTION INTRAVENOUS at 10:59

## 2017-01-01 RX ADMIN — FAMOTIDINE 20 MG: 10 INJECTION, SOLUTION INTRAVENOUS at 18:13

## 2017-01-01 RX ADMIN — IOPAMIDOL 265 ML: 612 INJECTION, SOLUTION INTRAVENOUS at 11:27

## 2017-01-01 RX ADMIN — METOPROLOL TARTRATE 5 MG: 5 INJECTION INTRAVENOUS at 09:43

## 2017-01-01 RX ADMIN — POTASSIUM CHLORIDE 20 MEQ: 1.5 POWDER, FOR SOLUTION ORAL at 17:00

## 2017-01-01 RX ADMIN — POTASSIUM CHLORIDE 20 MEQ: 1.5 POWDER, FOR SOLUTION ORAL at 17:34

## 2017-01-01 RX ADMIN — TRAMADOL HYDROCHLORIDE 50 MG: 50 TABLET, FILM COATED ORAL at 10:06

## 2017-01-01 RX ADMIN — GABAPENTIN 600 MG: 300 CAPSULE ORAL at 16:21

## 2017-01-01 RX ADMIN — TIOTROPIUM BROMIDE 18 MCG: 18 CAPSULE ORAL; RESPIRATORY (INHALATION) at 08:50

## 2017-01-01 RX ADMIN — ENOXAPARIN SODIUM 145 MG: 150 INJECTION SUBCUTANEOUS at 09:18

## 2017-01-01 RX ADMIN — Medication 10 ML: at 06:00

## 2017-01-01 RX ADMIN — HEPARIN SODIUM 1000 UNITS: 200 INJECTION, SOLUTION INTRAVENOUS at 10:11

## 2017-01-01 RX ADMIN — POTASSIUM CHLORIDE 20 MEQ: 1.5 POWDER, FOR SOLUTION ORAL at 17:25

## 2017-01-01 RX ADMIN — ENOXAPARIN SODIUM 145 MG: 150 INJECTION SUBCUTANEOUS at 10:11

## 2017-01-01 RX ADMIN — LORATADINE 10 MG: 10 TABLET ORAL at 08:33

## 2017-01-01 RX ADMIN — CARVEDILOL 12.5 MG: 12.5 TABLET, FILM COATED ORAL at 08:33

## 2017-01-01 RX ADMIN — HEPARIN SODIUM 2000 UNITS: 200 INJECTION, SOLUTION INTRAVENOUS at 09:32

## 2017-01-01 RX ADMIN — GABAPENTIN 600 MG: 300 CAPSULE ORAL at 09:27

## 2017-01-01 RX ADMIN — TRAMADOL HYDROCHLORIDE 50 MG: 50 TABLET, FILM COATED ORAL at 18:58

## 2017-01-01 RX ADMIN — ASPIRIN 81 MG: 81 TABLET, COATED ORAL at 09:17

## 2017-01-01 RX ADMIN — TRAMADOL HYDROCHLORIDE 50 MG: 50 TABLET, FILM COATED ORAL at 02:10

## 2017-01-01 RX ADMIN — LIDOCAINE HYDROCHLORIDE 10 ML: 10 INJECTION INFILTRATION; PERINEURAL at 09:36

## 2017-01-01 RX ADMIN — MIDAZOLAM HYDROCHLORIDE 0.5 MG: 1 INJECTION, SOLUTION INTRAMUSCULAR; INTRAVENOUS at 10:10

## 2017-01-01 RX ADMIN — GABAPENTIN 600 MG: 300 CAPSULE ORAL at 17:53

## 2017-01-01 RX ADMIN — CARVEDILOL 3.12 MG: 6.25 TABLET, FILM COATED ORAL at 09:17

## 2017-01-01 RX ADMIN — GABAPENTIN 600 MG: 300 CAPSULE ORAL at 09:16

## 2017-01-01 RX ADMIN — FUROSEMIDE 40 MG: 40 TABLET ORAL at 09:27

## 2017-01-01 RX ADMIN — GABAPENTIN 600 MG: 300 CAPSULE ORAL at 17:34

## 2017-01-01 RX ADMIN — POTASSIUM CHLORIDE 20 MEQ: 1.5 POWDER, FOR SOLUTION ORAL at 09:17

## 2017-01-01 RX ADMIN — CARVEDILOL 6.25 MG: 6.25 TABLET, FILM COATED ORAL at 10:08

## 2017-01-01 RX ADMIN — CARVEDILOL 3.12 MG: 6.25 TABLET, FILM COATED ORAL at 16:35

## 2017-01-01 RX ADMIN — CARVEDILOL 3.12 MG: 6.25 TABLET, FILM COATED ORAL at 17:00

## 2017-01-01 RX ADMIN — MIDAZOLAM HYDROCHLORIDE 0.5 MG: 1 INJECTION, SOLUTION INTRAMUSCULAR; INTRAVENOUS at 10:44

## 2017-01-01 RX ADMIN — GABAPENTIN 600 MG: 300 CAPSULE ORAL at 09:17

## 2017-01-01 RX ADMIN — CARVEDILOL 3.12 MG: 6.25 TABLET, FILM COATED ORAL at 09:27

## 2017-01-01 RX ADMIN — FUROSEMIDE 80 MG: 40 TABLET ORAL at 09:48

## 2017-01-01 RX ADMIN — FUROSEMIDE 40 MG: 40 TABLET ORAL at 09:47

## 2017-01-01 RX ADMIN — POTASSIUM CHLORIDE 20 MEQ: 1.5 POWDER, FOR SOLUTION ORAL at 09:27

## 2017-01-01 RX ADMIN — Medication 10 ML: at 17:34

## 2017-01-01 RX ADMIN — ENOXAPARIN SODIUM 145 MG: 150 INJECTION SUBCUTANEOUS at 22:49

## 2017-01-01 RX ADMIN — LORATADINE 10 MG: 10 TABLET ORAL at 09:47

## 2017-01-01 RX ADMIN — HEPARIN SODIUM 4000 UNITS: 1000 INJECTION, SOLUTION INTRAVENOUS; SUBCUTANEOUS at 09:40

## 2017-01-01 RX ADMIN — FUROSEMIDE 80 MG: 40 TABLET ORAL at 17:34

## 2017-01-01 RX ADMIN — FUROSEMIDE 60 MG: 10 INJECTION, SOLUTION INTRAMUSCULAR; INTRAVENOUS at 10:06

## 2017-01-01 RX ADMIN — GABAPENTIN 600 MG: 300 CAPSULE ORAL at 18:09

## 2017-01-01 RX ADMIN — GABAPENTIN 600 MG: 300 CAPSULE ORAL at 17:06

## 2017-01-01 RX ADMIN — TRAMADOL HYDROCHLORIDE 50 MG: 50 TABLET, FILM COATED ORAL at 10:08

## 2017-01-01 RX ADMIN — LORATADINE 10 MG: 10 TABLET ORAL at 05:05

## 2017-01-01 RX ADMIN — LISINOPRIL 10 MG: 5 TABLET ORAL at 12:24

## 2017-01-01 RX ADMIN — ASPIRIN 81 MG: 81 TABLET, COATED ORAL at 09:48

## 2017-01-01 RX ADMIN — CARVEDILOL 3.12 MG: 6.25 TABLET, FILM COATED ORAL at 17:07

## 2017-01-01 RX ADMIN — TRAMADOL HYDROCHLORIDE 50 MG: 50 TABLET, FILM COATED ORAL at 16:21

## 2017-01-01 RX ADMIN — FUROSEMIDE 40 MG: 40 TABLET ORAL at 18:00

## 2017-01-01 RX ADMIN — FUROSEMIDE 40 MG: 40 TABLET ORAL at 17:07

## 2017-01-01 RX ADMIN — FENTANYL CITRATE 25 MCG: 50 INJECTION, SOLUTION INTRAMUSCULAR; INTRAVENOUS at 10:44

## 2017-01-01 RX ADMIN — Medication 10 ML: at 14:32

## 2017-01-01 RX ADMIN — CARVEDILOL 12.5 MG: 12.5 TABLET, FILM COATED ORAL at 17:25

## 2017-01-01 RX ADMIN — TRAMADOL HYDROCHLORIDE 50 MG: 50 TABLET, FILM COATED ORAL at 17:27

## 2017-01-01 RX ADMIN — Medication 5 ML: at 21:10

## 2017-01-01 RX ADMIN — CARVEDILOL 12.5 MG: 12.5 TABLET, FILM COATED ORAL at 17:34

## 2017-01-01 RX ADMIN — HYDRALAZINE HYDROCHLORIDE 10 MG: 20 INJECTION INTRAMUSCULAR; INTRAVENOUS at 09:24

## 2017-01-01 RX ADMIN — ASPIRIN 81 MG 81 MG: 81 TABLET ORAL at 09:00

## 2017-01-01 RX ADMIN — ENOXAPARIN SODIUM 145 MG: 150 INJECTION SUBCUTANEOUS at 22:00

## 2017-01-01 RX ADMIN — MIDAZOLAM HYDROCHLORIDE 1 MG: 1 INJECTION, SOLUTION INTRAMUSCULAR; INTRAVENOUS at 11:19

## 2017-01-01 RX ADMIN — Medication 10 ML: at 17:26

## 2017-01-01 RX ADMIN — MIDAZOLAM HYDROCHLORIDE 0.5 MG: 1 INJECTION, SOLUTION INTRAMUSCULAR; INTRAVENOUS at 10:22

## 2017-01-01 RX ADMIN — Medication 10 ML: at 22:14

## 2017-01-01 RX ADMIN — FUROSEMIDE 40 MG: 40 TABLET ORAL at 14:17

## 2017-01-01 RX ADMIN — ENOXAPARIN SODIUM 145 MG: 150 INJECTION SUBCUTANEOUS at 21:24

## 2017-01-01 RX ADMIN — POTASSIUM CHLORIDE 20 MEQ: 1.5 POWDER, FOR SOLUTION ORAL at 16:20

## 2017-01-01 RX ADMIN — ENOXAPARIN SODIUM 145 MG: 150 INJECTION SUBCUTANEOUS at 23:13

## 2017-01-01 RX ADMIN — ASPIRIN 81 MG: 81 TABLET, COATED ORAL at 09:27

## 2017-01-01 RX ADMIN — FENTANYL CITRATE 25 MCG: 50 INJECTION, SOLUTION INTRAMUSCULAR; INTRAVENOUS at 09:35

## 2017-01-01 RX ADMIN — TRAMADOL HYDROCHLORIDE 50 MG: 50 TABLET, FILM COATED ORAL at 09:44

## 2017-01-01 RX ADMIN — POTASSIUM CHLORIDE 20 MEQ: 1.5 POWDER, FOR SOLUTION ORAL at 16:34

## 2017-01-01 RX ADMIN — ASPIRIN 81 MG: 81 TABLET, COATED ORAL at 10:07

## 2017-01-01 RX ADMIN — CARVEDILOL 6.25 MG: 6.25 TABLET, FILM COATED ORAL at 16:20

## 2017-01-01 RX ADMIN — PERFLUTREN 2 ML: 6.52 INJECTION, SUSPENSION INTRAVENOUS at 10:49

## 2017-01-01 RX ADMIN — LORATADINE 10 MG: 10 TABLET ORAL at 09:27

## 2017-01-01 RX ADMIN — Medication 10 ML: at 22:42

## 2017-01-01 RX ADMIN — IOPAMIDOL 100 ML: 612 INJECTION, SOLUTION INTRAVENOUS at 09:04

## 2017-01-01 RX ADMIN — Medication 10 ML: at 22:00

## 2017-01-01 RX ADMIN — GABAPENTIN 600 MG: 300 CAPSULE ORAL at 16:34

## 2017-01-01 RX ADMIN — ENOXAPARIN SODIUM 145 MG: 150 INJECTION SUBCUTANEOUS at 08:33

## 2017-01-01 RX ADMIN — GABAPENTIN 600 MG: 300 CAPSULE ORAL at 10:11

## 2017-01-01 RX ADMIN — CARVEDILOL 3.12 MG: 6.25 TABLET, FILM COATED ORAL at 10:10

## 2017-01-01 RX ADMIN — GABAPENTIN 600 MG: 300 CAPSULE ORAL at 08:33

## 2017-01-01 RX ADMIN — PRAVASTATIN SODIUM 40 MG: 20 TABLET ORAL at 21:52

## 2017-01-01 RX ADMIN — TRAMADOL HYDROCHLORIDE 50 MG: 50 TABLET, FILM COATED ORAL at 17:07

## 2017-01-01 RX ADMIN — POTASSIUM CHLORIDE 20 MEQ: 1.5 POWDER, FOR SOLUTION ORAL at 08:33

## 2017-01-01 RX ADMIN — GABAPENTIN 600 MG: 300 CAPSULE ORAL at 21:52

## 2017-01-01 RX ADMIN — POTASSIUM CHLORIDE 20 MEQ: 1.5 POWDER, FOR SOLUTION ORAL at 17:06

## 2017-01-01 RX ADMIN — LORATADINE 10 MG: 10 TABLET ORAL at 09:17

## 2017-01-01 RX ADMIN — LIDOCAINE HYDROCHLORIDE 10 ML: 10 INJECTION, SOLUTION INFILTRATION; PERINEURAL at 09:36

## 2017-01-01 RX ADMIN — FUROSEMIDE 40 MG: 40 TABLET ORAL at 10:10

## 2017-01-01 RX ADMIN — GABAPENTIN 600 MG: 300 CAPSULE ORAL at 17:27

## 2017-01-01 RX ADMIN — SODIUM CHLORIDE 50 ML/HR: 900 INJECTION, SOLUTION INTRAVENOUS at 06:00

## 2017-01-01 RX ADMIN — METOROPROLOL TARTRATE 5 MG: 5 INJECTION, SOLUTION INTRAVENOUS at 09:43

## 2017-01-01 RX ADMIN — TRAMADOL HYDROCHLORIDE 50 MG: 50 TABLET, FILM COATED ORAL at 17:34

## 2017-01-01 RX ADMIN — ASPIRIN 81 MG: 81 TABLET, COATED ORAL at 08:33

## 2017-01-01 RX ADMIN — ENOXAPARIN SODIUM 145 MG: 150 INJECTION SUBCUTANEOUS at 09:27

## 2017-01-01 RX ADMIN — GABAPENTIN 600 MG: 300 CAPSULE ORAL at 22:00

## 2017-01-01 RX ADMIN — POTASSIUM CHLORIDE 20 MEQ: 1.5 POWDER, FOR SOLUTION ORAL at 13:18

## 2017-01-01 RX ADMIN — VERAPAMIL HYDROCHLORIDE 3 ML: 2.5 INJECTION INTRAVENOUS at 09:38

## 2017-01-01 RX ADMIN — POTASSIUM CHLORIDE 20 MEQ: 1.5 POWDER, FOR SOLUTION ORAL at 12:07

## 2017-01-01 RX ADMIN — ASPIRIN 81 MG: 81 TABLET, COATED ORAL at 09:47

## 2017-01-01 RX ADMIN — PRAVASTATIN SODIUM 40 MG: 20 TABLET ORAL at 22:47

## 2017-01-01 RX ADMIN — GABAPENTIN 600 MG: 300 CAPSULE ORAL at 09:46

## 2017-01-01 RX ADMIN — FUROSEMIDE 60 MG: 10 INJECTION, SOLUTION INTRAMUSCULAR; INTRAVENOUS at 08:33

## 2017-01-01 RX ADMIN — ENOXAPARIN SODIUM 145 MG: 150 INJECTION SUBCUTANEOUS at 10:09

## 2017-01-01 RX ADMIN — MIDAZOLAM HYDROCHLORIDE 0.5 MG: 1 INJECTION, SOLUTION INTRAMUSCULAR; INTRAVENOUS at 09:35

## 2017-01-01 RX ADMIN — CARVEDILOL 3.12 MG: 3.12 TABLET, FILM COATED ORAL at 12:24

## 2017-01-01 RX ADMIN — FUROSEMIDE 60 MG: 10 INJECTION, SOLUTION INTRAMUSCULAR; INTRAVENOUS at 14:32

## 2017-01-01 RX ADMIN — CARVEDILOL 3.12 MG: 3.12 TABLET, FILM COATED ORAL at 18:09

## 2017-01-01 RX ADMIN — Medication 10 ML: at 17:08

## 2017-01-01 RX ADMIN — POTASSIUM CHLORIDE 20 MEQ: 1.5 POWDER, FOR SOLUTION ORAL at 17:54

## 2017-01-01 RX ADMIN — PRAVASTATIN SODIUM 40 MG: 20 TABLET ORAL at 23:02

## 2017-01-01 RX ADMIN — TRAMADOL HYDROCHLORIDE 50 MG: 50 TABLET, FILM COATED ORAL at 09:24

## 2017-01-01 RX ADMIN — GABAPENTIN 600 MG: 300 CAPSULE ORAL at 10:07

## 2017-01-01 RX ADMIN — Medication 5 ML: at 05:06

## 2017-01-01 RX ADMIN — FUROSEMIDE 40 MG: 40 TABLET ORAL at 09:17

## 2017-01-01 RX ADMIN — PRAVASTATIN SODIUM 40 MG: 20 TABLET ORAL at 21:25

## 2017-01-01 RX ADMIN — LORATADINE 10 MG: 10 TABLET ORAL at 10:07

## 2017-01-01 RX ADMIN — Medication 5 ML: at 14:00

## 2017-01-01 RX ADMIN — TRAMADOL HYDROCHLORIDE 50 MG: 50 TABLET, FILM COATED ORAL at 18:02

## 2017-01-01 RX ADMIN — FUROSEMIDE 60 MG: 10 INJECTION, SOLUTION INTRAMUSCULAR; INTRAVENOUS at 22:30

## 2017-01-01 RX ADMIN — LORAZEPAM 1 MG: 1 TABLET ORAL at 23:02

## 2017-01-01 RX ADMIN — POTASSIUM CHLORIDE 20 MEQ: 1.5 POWDER, FOR SOLUTION ORAL at 09:47

## 2017-01-01 RX ADMIN — CARVEDILOL 12.5 MG: 12.5 TABLET, FILM COATED ORAL at 09:49

## 2017-01-01 RX ADMIN — ENOXAPARIN SODIUM 145 MG: 150 INJECTION SUBCUTANEOUS at 21:09

## 2017-01-01 RX ADMIN — TRAMADOL HYDROCHLORIDE 50 MG: 50 TABLET, FILM COATED ORAL at 22:00

## 2017-01-01 RX ADMIN — GABAPENTIN 600 MG: 300 CAPSULE ORAL at 12:00

## 2017-01-01 RX ADMIN — FENTANYL CITRATE 25 MCG: 50 INJECTION, SOLUTION INTRAMUSCULAR; INTRAVENOUS at 11:06

## 2017-01-01 RX ADMIN — FENTANYL CITRATE 25 MCG: 50 INJECTION, SOLUTION INTRAMUSCULAR; INTRAVENOUS at 10:23

## 2017-01-01 RX ADMIN — ENOXAPARIN SODIUM 145 MG: 150 INJECTION SUBCUTANEOUS at 09:49

## 2017-01-01 RX ADMIN — TRAMADOL HYDROCHLORIDE 50 MG: 50 TABLET, FILM COATED ORAL at 08:33

## 2017-01-01 RX ADMIN — HYDRALAZINE HYDROCHLORIDE 10 MG: 20 INJECTION INTRAMUSCULAR; INTRAVENOUS at 09:51

## 2017-01-01 RX ADMIN — POTASSIUM CHLORIDE 20 MEQ: 1.5 POWDER, FOR SOLUTION ORAL at 10:10

## 2017-01-01 RX ADMIN — LIDOCAINE HYDROCHLORIDE 10 ML: 10 INJECTION INFILTRATION; PERINEURAL at 10:10

## 2017-01-01 RX ADMIN — ENOXAPARIN SODIUM 145 MG: 150 INJECTION SUBCUTANEOUS at 10:07

## 2017-01-01 RX ADMIN — Medication 10 ML: at 21:52

## 2017-01-01 RX ADMIN — POTASSIUM CHLORIDE 20 MEQ: 1.5 POWDER, FOR SOLUTION ORAL at 12:01

## 2017-01-01 RX ADMIN — CARVEDILOL 3.12 MG: 6.25 TABLET, FILM COATED ORAL at 09:46

## 2017-01-01 RX ADMIN — Medication 10 ML: at 05:32

## 2017-01-01 RX ADMIN — GABAPENTIN 600 MG: 300 CAPSULE ORAL at 16:00

## 2017-01-01 RX ADMIN — SODIUM CHLORIDE 50 ML/HR: 900 INJECTION, SOLUTION INTRAVENOUS at 09:01

## 2017-01-01 RX ADMIN — TRAMADOL HYDROCHLORIDE 50 MG: 50 TABLET, FILM COATED ORAL at 22:54

## 2017-01-01 RX ADMIN — GABAPENTIN 600 MG: 300 CAPSULE ORAL at 21:09

## 2017-01-01 RX ADMIN — GABAPENTIN 600 MG: 300 CAPSULE ORAL at 12:01

## 2017-01-01 RX ADMIN — Medication 10 ML: at 17:53

## 2017-01-01 RX ADMIN — PRAVASTATIN SODIUM 40 MG: 20 TABLET ORAL at 22:00

## 2017-01-01 RX ADMIN — LISINOPRIL 10 MG: 10 TABLET ORAL at 09:16

## 2017-01-01 RX ADMIN — ENOXAPARIN SODIUM 145 MG: 150 INJECTION SUBCUTANEOUS at 09:16

## 2017-01-01 RX ADMIN — ENOXAPARIN SODIUM 145 MG: 150 INJECTION SUBCUTANEOUS at 22:29

## 2017-01-01 RX ADMIN — TRAMADOL HYDROCHLORIDE 50 MG: 50 TABLET, FILM COATED ORAL at 01:31

## 2017-01-01 RX ADMIN — PRAVASTATIN SODIUM 40 MG: 20 TABLET ORAL at 22:30

## 2017-01-01 RX ADMIN — CARVEDILOL 3.12 MG: 6.25 TABLET, FILM COATED ORAL at 17:54

## 2017-01-01 RX ADMIN — DIPHENHYDRAMINE HYDROCHLORIDE 25 MG: 25 CAPSULE ORAL at 23:02

## 2017-01-01 RX ADMIN — Medication 10 ML: at 23:15

## 2017-01-01 RX ADMIN — FUROSEMIDE 60 MG: 10 INJECTION, SOLUTION INTRAMUSCULAR; INTRAVENOUS at 21:25

## 2017-01-01 RX ADMIN — TRAMADOL HYDROCHLORIDE 50 MG: 50 TABLET, FILM COATED ORAL at 15:00

## 2017-01-01 RX ADMIN — POTASSIUM CHLORIDE 20 MEQ: 1.5 POWDER, FOR SOLUTION ORAL at 09:49

## 2017-01-01 RX ADMIN — TRAMADOL HYDROCHLORIDE 50 MG: 50 TABLET, FILM COATED ORAL at 10:14

## 2017-01-01 RX ADMIN — INSULIN LISPRO 2 UNITS: 100 INJECTION, SOLUTION INTRAVENOUS; SUBCUTANEOUS at 12:43

## 2017-01-01 RX ADMIN — LORATADINE 10 MG: 10 TABLET ORAL at 09:49

## 2017-01-01 RX ADMIN — PRAVASTATIN SODIUM 40 MG: 20 TABLET ORAL at 21:09

## 2017-01-01 RX ADMIN — TRAMADOL HYDROCHLORIDE 50 MG: 50 TABLET, FILM COATED ORAL at 09:48

## 2017-01-01 RX ADMIN — TRAMADOL HYDROCHLORIDE 50 MG: 50 TABLET, FILM COATED ORAL at 12:01

## 2017-01-01 RX ADMIN — HYDRALAZINE HYDROCHLORIDE 10 MG: 20 INJECTION INTRAMUSCULAR; INTRAVENOUS at 04:05

## 2017-01-01 RX ADMIN — TRAMADOL HYDROCHLORIDE 50 MG: 50 TABLET, FILM COATED ORAL at 04:05

## 2017-01-01 RX ADMIN — TRAMADOL HYDROCHLORIDE 50 MG: 50 TABLET, FILM COATED ORAL at 22:30

## 2017-01-01 RX ADMIN — FENTANYL CITRATE 25 MCG: 50 INJECTION, SOLUTION INTRAMUSCULAR; INTRAVENOUS at 10:10

## 2017-01-01 RX ADMIN — Medication 10 ML: at 07:19

## 2017-01-01 RX ADMIN — FUROSEMIDE 40 MG: 40 TABLET ORAL at 16:36

## 2017-01-01 RX ADMIN — INSULIN LISPRO 2 UNITS: 100 INJECTION, SOLUTION INTRAVENOUS; SUBCUTANEOUS at 08:34

## 2017-01-01 RX ADMIN — FENTANYL CITRATE 50 MCG: 50 INJECTION, SOLUTION INTRAMUSCULAR; INTRAVENOUS at 11:19

## 2017-01-01 RX ADMIN — ASPIRIN 81 MG: 81 TABLET, COATED ORAL at 10:10

## 2017-01-01 RX ADMIN — GABAPENTIN 600 MG: 300 CAPSULE ORAL at 09:49

## 2017-01-01 RX ADMIN — ENOXAPARIN SODIUM 145 MG: 150 INJECTION SUBCUTANEOUS at 21:49

## 2017-01-01 RX ADMIN — FUROSEMIDE 40 MG: 40 TABLET ORAL at 17:53

## 2017-09-28 PROBLEM — I25.10 CORONARY ARTERY DISEASE: Status: ACTIVE | Noted: 2017-01-01

## 2017-09-28 NOTE — PROGRESS NOTES
TRANSFER - OUT REPORT:    Verbal report given to Nannette Winston RN on Corina Villa  being transferred to [facility] for routine progression of care       Report consisted of patients Situation, Background, Assessment and   Recommendations(SBAR). Information from the following report(s) Procedure Summary, Intake/Output, MAR and Cardiac Rhythm NSR w PVCs was reviewed with the receiving nurse. Lines:   Peripheral IV 09/28/17 Left Antecubital (Active)   Site Assessment Clean, dry, & intact 9/28/2017  9:09 AM   Phlebitis Assessment 0 9/28/2017  9:09 AM   Infiltration Assessment 0 9/28/2017  9:09 AM   Dressing Type Transparent 9/28/2017  9:09 AM   Hub Color/Line Status Infusing;Pink 9/28/2017  9:09 AM   Alcohol Cap Used Yes 9/28/2017  9:09 AM        Opportunity for questions and clarification was provided.       Patient transported with:   Monitor  Registered Nurse

## 2017-09-28 NOTE — IP AVS SNAPSHOT
Summary of Care Report The Summary of Care report has been created to help improve care coordination. Users with access to [employer] or [address] (Web-based application) may access additional patient information including the Discharge Summary. If you are not currently a [address] user and need more information, please call the number listed below in the [place] [address] section and ask to be connected with Medical Records. Facility Information Name Address Phone [address] [address] [phone] Patient Information Patient Name Sex  Ricky May ([id number]) Male 1950 Discharge Information Admitting Provider Service Area Unit Latoya Montelongo MD / [address] [place] [address]/Med / [phone] Discharge Provider Discharge Date/Time Discharge Disposition Destination (none) (none) (none) (none) Patient Language Language ENGLISH [13] Hospital Problems as of 2017  Never Reviewed Class Noted - Resolved Last Modified POA Active Problems Coronary artery disease  2017 - Present 2017 by Latoya Montelongo MD Unknown Entered by Latoya Montelongo MD  
  
You are allergic to the following No active allergies Current Discharge Medication List  
  
ASK your doctor about these medications Dose & Instructions Dispensing Information Comments  
 cetirizine 10 mg tablet Commonly known as:  ZYRTEC Take  by mouth. Refills:  0 COREG 3.125 mg tablet Generic drug:  carvedilol Dose:  3.125 mg Take 3.125 mg by mouth daily. Refills:  0  
   
 doxycycline 20 mg tablet Commonly known as:  PERIOSTAT Dose:  20 mg Take 20 mg by mouth two (2) times a day. Refills:  0  
   
 furosemide 20 mg tablet Commonly known as:  LASIX Take  by mouth two (2) times daily as needed. Refills:  0  
   
 gabapentin 600 mg tablet Commonly known as:  NEURONTIN Dose:  600 mg Take 600 mg by mouth three (3) times daily. Refills:  0  
   
 lisinopril 10 mg tablet Commonly known as:  Robins Yolo Dose:  10 mg Take 10 mg by mouth daily. Refills:  0  
   
 traMADol 50 mg tablet Commonly known as:  ULTRAM  
 Dose:  50 mg Take 50 mg by mouth every six (6) hours as needed for Pain. Refills:  0  
   
 traZODone 50 mg tablet Commonly known as:  Sonja Hand Dose:  50 mg Take 50 mg by mouth nightly. Refills:  0  
   
 warfarin 5 mg tablet Commonly known as:  COUMADIN Dose:  5 mg Take 5 mg by mouth daily. Refills:  0 XARELTO 20 mg Tab tablet Generic drug:  rivaroxaban Dose:  20 mg Take 20 mg by mouth daily (with dinner). Refills:  0 Follow-up Information None Discharge Instructions Cardiac Catheterization/Angiography Discharge Instructions *Check the puncture site frequently for swelling or bleeding. If you see any bleeding, lie down and apply pressure over the area with a clean town or washcloth. Notify your doctor for any redness, swelling, drainage or oozing from the puncture site. Notify your doctor for any fever or chills. *If the leg or arm with the puncture becomes cold, numb or painful, go to your nearest emergency room. *Activity should be limited for the next 48 hours. Climb stairs as little as possible and avoid any stooping, bending or strenuous activity for 48 hours. No heavy lifting (anything over 10 pounds) for three days. *Do not drive for 48 hours. *You may resume your usual diet. Drink more fluids than usual. 
 
*Have a responsible person drive you home and stay with you for at least 24 hours after your heart catheterization/angiography. *You may remove the bandage from your Right Arm in 24 hours.  You may shower in 24 hours. No tub baths, hot tubs or swimming for one week. Do not place any lotions, creams, powders, ointments over the puncture site for one week. You may place a clean band-aid over the puncture site each day for 5 days. Change this daily. Sedation for a Medical Procedure: Care Instructions Your Care Instructions For a minor procedure or surgery, you will get a sedative to help you relax. This drug will make you sleepy. It is usually given in a vein (by IV). A shot may also be used to numb the area. If you had local anesthesia, you may feel some pain and discomfort as it wears off. If you have pain, don't be afraid to say so. Pain medicine works better if you take it before the pain gets bad. Common side effects from sedation include: · Feeling sleepy. (Your doctors and nurses will make sure you are not too sleepy to go home.) · Nausea and vomiting. This usually does not last long. · Feeling tired. Follow-up care is a key part of your treatment and safety. Be sure to make and go to all appointments, and call your doctor if you are having problems. It's also a good idea to know your test results and keep a list of the medicines you take. How can you care for yourself at home? Activity · Don't do anything for 24 hours that requires attention to detail. It takes time for the medicine effects to completely wear off. · For your safety, you should not drive or operate any machinery that could be dangerous until the medicine wears off and you can think clearly and react easily. · Rest when you feel tired. Getting enough sleep will help you recover. Diet · You can eat your normal diet, unless your doctor gives you other instructions. If your stomach is upset, try clear liquids and bland, low-fat foods like plain toast or rice. · Drink plenty of fluids (unless your doctor tells you not to). · Don't drink alcohol for 24 hours. Medicines · Be safe with medicines. Read and follow all instructions on the label. ¨ If the doctor gave you a prescription medicine for pain, take it as prescribed. ¨ If you are not taking a prescription pain medicine, ask your doctor if you can take an over-the-counter medicine. · If you think your pain medicine is making you sick to your stomach: 
¨ Take your medicine after meals (unless your doctor has told you not to). ¨ Ask your doctor for a different pain medicine. When should you call for help? Call 911 anytime you think you may need emergency care. For example, call if: 
· You have severe trouble breathing. · You passed out (lost consciousness). Call your doctor now or seek immediate medical care if: 
· You have trouble breathing. · You have ongoing or worsening nausea or vomiting. · You have a fever. · You have a new or worse headache. · The medicine is not wearing off and you can't think clearly. Watch closely for changes in your health, and be sure to contact your doctor if: 
· You do not get better as expected. Where can you learn more? Go to http://penny-rashaun.info/. Enter V682 in the search box to learn more about \"Sedation for a Medical Procedure: Care Instructions. \" Current as of: August 14, 2016 Content Version: 11.3 © 0772-9285 [employer]. Care instructions adapted under license by [employer] (which disclaims liability or warranty for this information). If you have questions about a medical condition or this instruction, always ask your healthcare professional. [place] [address] any warranty or liability for your use of this information. Chart Review Routing History No Routing History on File

## 2017-09-28 NOTE — IP AVS SNAPSHOT
Bola Chadwick 
 
 
 [address] [facility] [address] 
[phone] Patient: Jian Agrawal MRN: [id number] You are allergic to the following No active allergies Recent Documentation Height Weight BMI  
  
  
 1.803 m 145.6 kg 44.77 kg/m2 Emergency Contacts Name Discharge Info Relation Home Work Mobile Melissa Dsouza  Spouse [3] [phone] About your hospitalization You were admitted on:  September 28, 2017 You last received care in the:  [facility] You were discharged on:  September 28, 2017 Unit phone number:  [phone] Why you were hospitalized Your primary diagnosis was:  Not on File Your diagnoses also included:  Coronary Artery Disease Providers Seen During Your Hospitalizations Provider Role Specialty Primary office phone Bob Kurtz MD Attending Provider Cardiology [phone] Your Primary Care Physician (PCP) Primary Care Physician Office Phone Office Fax Tomasz York [phone] Follow-up Information None Current Discharge Medication List  
  
ASK your doctor about these medications Dose & Instructions Dispensing Information Comments Morning Noon Evening Bedtime  
 cetirizine 10 mg tablet Commonly known as:  ZYRTEC Your last dose was: Your next dose is: Take  by mouth. Refills:  0 COREG 3.125 mg tablet Generic drug:  carvedilol Your last dose was: Your next dose is:    
   
   
 Dose:  3.125 mg Take 3.125 mg by mouth daily. Refills:  0  
     
   
   
   
  
 doxycycline 20 mg tablet Commonly known as:  PERIOSTAT Your last dose was: Your next dose is:    
   
   
 Dose:  20 mg Take 20 mg by mouth two (2) times a day. Refills:  0 furosemide 20 mg tablet Commonly known as:  LASIX Your last dose was: Your next dose is: Take  by mouth two (2) times daily as needed. Refills:  0  
     
   
   
   
  
 gabapentin 600 mg tablet Commonly known as:  NEURONTIN Your last dose was: Your next dose is:    
   
   
 Dose:  600 mg Take 600 mg by mouth three (3) times daily. Refills:  0  
     
   
   
   
  
 lisinopril 10 mg tablet Commonly known as:  Jairo Abdi Your last dose was: Your next dose is:    
   
   
 Dose:  10 mg Take 10 mg by mouth daily. Refills:  0  
     
   
   
   
  
 traMADol 50 mg tablet Commonly known as:  ULTRAM  
   
Your last dose was: Your next dose is:    
   
   
 Dose:  50 mg Take 50 mg by mouth every six (6) hours as needed for Pain. Refills:  0  
     
   
   
   
  
 traZODone 50 mg tablet Commonly known as:  Long Ort Your last dose was: Your next dose is:    
   
   
 Dose:  50 mg Take 50 mg by mouth nightly. Refills:  0  
     
   
   
   
  
 warfarin 5 mg tablet Commonly known as:  COUMADIN Your last dose was: Your next dose is:    
   
   
 Dose:  5 mg Take 5 mg by mouth daily. Refills:  0 XARELTO 20 mg Tab tablet Generic drug:  rivaroxaban Your last dose was: Your next dose is:    
   
   
 Dose:  20 mg Take 20 mg by mouth daily (with dinner). Refills:  0 Discharge Instructions Cardiac Catheterization/Angiography Discharge Instructions *Check the puncture site frequently for swelling or bleeding. If you see any bleeding, lie down and apply pressure over the area with a clean town or washcloth. Notify your doctor for any redness, swelling, drainage or oozing from the puncture site. Notify your doctor for any fever or chills. *If the leg or arm with the puncture becomes cold, numb or painful, go to your nearest emergency room. *Activity should be limited for the next 48 hours. Climb stairs as little as possible and avoid any stooping, bending or strenuous activity for 48 hours. No heavy lifting (anything over 10 pounds) for three days. *Do not drive for 48 hours. *You may resume your usual diet. Drink more fluids than usual. 
 
*Have a responsible person drive you home and stay with you for at least 24 hours after your heart catheterization/angiography. *You may remove the bandage from your Right Arm in 24 hours. You may shower in 24 hours. No tub baths, hot tubs or swimming for one week. Do not place any lotions, creams, powders, ointments over the puncture site for one week. You may place a clean band-aid over the puncture site each day for 5 days. Change this daily. Sedation for a Medical Procedure: Care Instructions Your Care Instructions For a minor procedure or surgery, you will get a sedative to help you relax. This drug will make you sleepy. It is usually given in a vein (by IV). A shot may also be used to numb the area. If you had local anesthesia, you may feel some pain and discomfort as it wears off. If you have pain, don't be afraid to say so. Pain medicine works better if you take it before the pain gets bad. Common side effects from sedation include: · Feeling sleepy. (Your doctors and nurses will make sure you are not too sleepy to go home.) · Nausea and vomiting. This usually does not last long. · Feeling tired. Follow-up care is a key part of your treatment and safety. Be sure to make and go to all appointments, and call your doctor if you are having problems. It's also a good idea to know your test results and keep a list of the medicines you take. How can you care for yourself at home? Activity · Don't do anything for 24 hours that requires attention to detail.  It takes time for the medicine effects to completely wear off. · For your safety, you should not drive or operate any machinery that could be dangerous until the medicine wears off and you can think clearly and react easily. · Rest when you feel tired. Getting enough sleep will help you recover. Diet · You can eat your normal diet, unless your doctor gives you other instructions. If your stomach is upset, try clear liquids and bland, low-fat foods like plain toast or rice. · Drink plenty of fluids (unless your doctor tells you not to). · Don't drink alcohol for 24 hours. Medicines · Be safe with medicines. Read and follow all instructions on the label. ¨ If the doctor gave you a prescription medicine for pain, take it as prescribed. ¨ If you are not taking a prescription pain medicine, ask your doctor if you can take an over-the-counter medicine. · If you think your pain medicine is making you sick to your stomach: 
¨ Take your medicine after meals (unless your doctor has told you not to). ¨ Ask your doctor for a different pain medicine. When should you call for help? Call 911 anytime you think you may need emergency care. For example, call if: 
· You have severe trouble breathing. · You passed out (lost consciousness). Call your doctor now or seek immediate medical care if: 
· You have trouble breathing. · You have ongoing or worsening nausea or vomiting. · You have a fever. · You have a new or worse headache. · The medicine is not wearing off and you can't think clearly. Watch closely for changes in your health, and be sure to contact your doctor if: 
· You do not get better as expected. Where can you learn more? Go to http://penny-rashaun.info/. Enter E168 in the search box to learn more about \"Sedation for a Medical Procedure: Care Instructions. \" Current as of: August 14, 2016 Content Version: 11.3 © 0541-4517 [employer].  Care instructions adapted under license by [address] (which disclaims liability or warranty for this information). If you have questions about a medical condition or this instruction, always ask your healthcare professional. [address] any warranty or liability for your use of this information. Discharge Orders None Introducing hospitals & HEALTH SERVICES! [employer] introduces Evocha patient portal. Now you can access parts of your medical record, email your doctor's office, and request medication refills online. 1. In your internet browser, go to [email or web address] 2. Click on the First Time User? Click Here link in the Sign In box. You will see the New Member Sign Up page. 3. Enter your Evocha Access Code exactly as it appears below. You will not need to use this code after youve completed the sign-up process. If you do not sign up before the expiration date, you must request a new code. · Evocha Access Code: [email or web address] Expires: 12/26/2017  4:36 PM 
 
4. Enter the last four digits of your Social Security Number (xxxx) and Date of Birth (mm/dd/yyyy) as indicated and click Submit. You will be taken to the next sign-up page. 5. Create a Evocha ID. This will be your Evocha login ID and cannot be changed, so think of one that is secure and easy to remember. 6. Create a Evocha password. You can change your password at any time. 7. Enter your Password Reset Question and Answer. This can be used at a later time if you forget your password. 8. Enter your e-mail address. You will receive e-mail notification when new information is available in [address] 9. Click Sign Up. You can now view and download portions of your medical record. 10. Click the Download Summary menu link to download a portable copy of your medical information.  
 
If you have questions, please visit the Frequently Asked Questions section of the [employer]. Remember, MyChart is NOT to be used for urgent needs. For medical emergencies, dial 911. Now available from your iPhone and Android! General Information Please provide this summary of care documentation to your next provider. Patient Signature:  ____________________________________________________________ Date:  ____________________________________________________________  
  
Carlos Cart Provider Signature:  ____________________________________________________________ Date:  ____________________________________________________________

## 2017-09-28 NOTE — PROGRESS NOTES
Tr band to left wrist and venous sheath to left brachial discontinued. Pt tolerated well and sites are both wnl.

## 2017-09-28 NOTE — DISCHARGE INSTRUCTIONS
Cardiac Catheterization/Angiography Discharge Instructions    *Check the puncture site frequently for swelling or bleeding. If you see any bleeding, lie down and apply pressure over the area with a clean town or washcloth. Notify your doctor for any redness, swelling, drainage or oozing from the puncture site. Notify your doctor for any fever or chills. *If the leg or arm with the puncture becomes cold, numb or painful, go to your nearest emergency room. *Activity should be limited for the next 48 hours. Climb stairs as little as possible and avoid any stooping, bending or strenuous activity for 48 hours. No heavy lifting (anything over 10 pounds) for three days. *Do not drive for 48 hours. *You may resume your usual diet. Drink more fluids than usual.    *Have a responsible person drive you home and stay with you for at least 24 hours after your heart catheterization/angiography. *You may remove the bandage from your Right Arm in 24 hours. You may shower in 24 hours. No tub baths, hot tubs or swimming for one week. Do not place any lotions, creams, powders, ointments over the puncture site for one week. You may place a clean band-aid over the puncture site each day for 5 days. Change this daily. Sedation for a Medical Procedure: Care Instructions  Your Care Instructions  For a minor procedure or surgery, you will get a sedative to help you relax. This drug will make you sleepy. It is usually given in a vein (by IV). A shot may also be used to numb the area. If you had local anesthesia, you may feel some pain and discomfort as it wears off. If you have pain, don't be afraid to say so. Pain medicine works better if you take it before the pain gets bad. Common side effects from sedation include:  · Feeling sleepy. (Your doctors and nurses will make sure you are not too sleepy to go home.)  · Nausea and vomiting. This usually does not last long. · Feeling tired.   Follow-up care is a key part of your treatment and safety. Be sure to make and go to all appointments, and call your doctor if you are having problems. It's also a good idea to know your test results and keep a list of the medicines you take. How can you care for yourself at home? Activity  · Don't do anything for 24 hours that requires attention to detail. It takes time for the medicine effects to completely wear off. · For your safety, you should not drive or operate any machinery that could be dangerous until the medicine wears off and you can think clearly and react easily. · Rest when you feel tired. Getting enough sleep will help you recover. Diet  · You can eat your normal diet, unless your doctor gives you other instructions. If your stomach is upset, try clear liquids and bland, low-fat foods like plain toast or rice. · Drink plenty of fluids (unless your doctor tells you not to). · Don't drink alcohol for 24 hours. Medicines  · Be safe with medicines. Read and follow all instructions on the label. ¨ If the doctor gave you a prescription medicine for pain, take it as prescribed. ¨ If you are not taking a prescription pain medicine, ask your doctor if you can take an over-the-counter medicine. · If you think your pain medicine is making you sick to your stomach:  ¨ Take your medicine after meals (unless your doctor has told you not to). ¨ Ask your doctor for a different pain medicine. When should you call for help? Call 911 anytime you think you may need emergency care. For example, call if:  · You have severe trouble breathing. · You passed out (lost consciousness). Call your doctor now or seek immediate medical care if:  · You have trouble breathing. · You have ongoing or worsening nausea or vomiting. · You have a fever. · You have a new or worse headache. · The medicine is not wearing off and you can't think clearly.   Watch closely for changes in your health, and be sure to contact your doctor if:  · You do not get better as expected. Where can you learn more? Go to http://penny-rashaun.info/. Enter Q346 in the search box to learn more about \"Sedation for a Medical Procedure: Care Instructions. \"  Current as of: August 14, 2016  Content Version: 11.3  © 9911-3426 [employer]. Care instructions adapted under license by [employer] (which disclaims liability or warranty for this information). If you have questions about a medical condition or this instruction, always ask your healthcare professional. [address] any warranty or liability for your use of this information.

## 2017-09-28 NOTE — PROGRESS NOTES
TRANSFER - IN REPORT:    Verbal report received from Nickolas Vann (name) on Sharyle Barges  being received from Care Unit (unit) for ordered procedure      Report consisted of patients Situation, Background, Assessment and   Recommendations(SBAR). Information from the following report(s) SBAR, Kardex, MAR, Recent Results and Cardiac Rhythm NSR with PVC's was reviewed with the receiving nurse. Opportunity for questions and clarification was provided. Assessment completed upon patients arrival to unit and care assumed.

## 2017-09-28 NOTE — ROUTINE PROCESS
Cardiac Cath Lab:  Pre Procedure Chart Check     Patients chart was accessed and reviewed for possible and/or scheduled procedure. Creatinine Clearance:  CREATININE: 0.98 MG/DL (09/28/17 0740)  Estimated creatinine clearance: 108.4 mL/min    Total Contrast  Load:  3 x estimated clearance amount=  324   ml    75% of Contrast Load:  0.75 x Total Contrast Load=  243      ml    Recent Labs      09/28/17   0740   WBC  5.7   RBC  4.55*   HCT  43.8   HGB  14.8   PLT  113*   INR  1.7*   PTP  19.2*   NA  141   K  4.2   BUN  19*   CREA  0.98   GFRAA  >60   GFRNA  >60   CA  9.1       BMI: Body mass index is 44.77 kg/(m^2). ALLERGIES: No Known Allergies    Lines:                History:    No past medical history on file. No past surgical history on file. There is no problem list on file for this patient.

## 2017-09-28 NOTE — PROGRESS NOTES
[address] and spoke to Kaela Miller- relayed pt's new room number and nurse's station phone number.

## 2017-09-28 NOTE — PROGRESS NOTES
Patient arrived on unit, transferred from Care Unit to room 358. Dressing to right wrist, right groin, and right brachial clean dry and intact.

## 2017-09-28 NOTE — PROGRESS NOTES
Cardiology Progress Note        Patient: Sharyle Barges        Sex: male          DOA: 9/28/2017  YOB: 1950      Age:  78 y.o.        LOS:  LOS: 0 days   Assessment/Plan   Date of Surgery Update:  Sharyle Barges was seen and examined. History and physical has been reviewed. The patient has been examined.  There have been no significant clinical changes since the completion of the originally dated History and Physical.    Signed By: Pradeep Browne MD     September 28, 2017 9:21 AM             Signed By: Pradeep Browne MD     September 28, 2017

## 2017-09-28 NOTE — ROUTINE PROCESS
Bedside and Verbal shift change report given to BRANDON Chacon (oncoming nurse) by Ramesh (offgoing nurse). Report included the following information SBAR, Kardex, Intake/Output, MAR, Recent Results and Cardiac Rhythm Afib.

## 2017-09-29 PROBLEM — Q54.0 CORONAL HYPOSPADIAS: Status: ACTIVE | Noted: 2017-01-01

## 2017-09-29 PROBLEM — M10.9 GOUT: Status: ACTIVE | Noted: 2017-01-01

## 2017-09-29 PROBLEM — R07.9 CHEST PAIN: Status: ACTIVE | Noted: 2017-01-01

## 2017-09-29 PROBLEM — E11.9 DIABETES MELLITUS WITH NO COMPLICATION (HCC): Status: ACTIVE | Noted: 2017-01-01

## 2017-09-29 PROBLEM — K13.79 LESION OF SOFT PALATE: Status: ACTIVE | Noted: 2017-01-01

## 2017-09-29 PROBLEM — H33.309 RETINAL HOLE OR TEAR: Status: ACTIVE | Noted: 2017-01-01

## 2017-09-29 PROBLEM — I35.0 AORTIC STENOSIS: Status: ACTIVE | Noted: 2017-01-01

## 2017-09-29 PROBLEM — Z96.1 PSEUDOPHAKIA OF BOTH EYES: Status: ACTIVE | Noted: 2017-01-01

## 2017-09-29 PROBLEM — I48.20 CHRONIC ATRIAL FIBRILLATION (HCC): Status: ACTIVE | Noted: 2017-01-01

## 2017-09-29 PROBLEM — Z79.01 CHRONIC ANTICOAGULATION: Status: ACTIVE | Noted: 2017-01-01

## 2017-09-29 PROBLEM — I25.10 CAD (CORONARY ARTERY DISEASE): Chronic | Status: ACTIVE | Noted: 2017-01-01

## 2017-09-29 PROBLEM — Z79.01 CHRONIC ANTICOAGULATION: Chronic | Status: ACTIVE | Noted: 2017-01-01

## 2017-09-29 PROBLEM — E11.9 DIABETES MELLITUS WITH NO COMPLICATION (HCC): Chronic | Status: ACTIVE | Noted: 2017-01-01

## 2017-09-29 PROBLEM — E87.70 FLUID OVERLOAD: Status: ACTIVE | Noted: 2017-01-01

## 2017-09-29 PROBLEM — I48.91 ATRIAL FIBRILLATION (HCC): Status: ACTIVE | Noted: 2017-01-01

## 2017-09-29 PROBLEM — I48.20 CHRONIC ATRIAL FIBRILLATION (HCC): Chronic | Status: ACTIVE | Noted: 2017-01-01

## 2017-09-29 PROBLEM — I07.1 TRICUSPID REGURGITATION: Status: ACTIVE | Noted: 2017-01-01

## 2017-09-29 PROBLEM — E11.319 DIABETIC RETINOPATHY ASSOCIATED WITH TYPE 2 DIABETES MELLITUS (HCC): Status: ACTIVE | Noted: 2017-01-01

## 2017-09-29 NOTE — CONSULTS
[facility] - Consult Note    Consultation request by Kraig Vasquez MD for advice/opinion related to evaluating CAD    Date of  Admission: 9/29/2017  1:22 AM   Primary Care Physician:  Hector Sever, MD     Assessment:     Patient Active Problem List   Diagnosis Code    Coronary artery disease involving native coronary artery of native heart without angina pectoris I25.10    'Light-for-dates' infant with signs of fetal malnutrition P05.00    Diabetes mellitus with no complication ([facility] [address] E91.8    Pseudophakia of both eyes Z96.1    Retinal hole or tear H33.309    Aortic stenosis I35.0    Coronal hypospadias Q54.0    Chronic atrial fibrillation (HCC) I48.2    Chest pain R07.9    Fluid overload E87.70    Tricuspid regurgitation I07.1    Chronic anticoagulation Z79.01    Gout M10.9    Diabetic retinopathy associated with type 2 diabetes mellitus (HCC) E11.319    Lesion of soft palate K13.79     -Coronary artery disease, cardiac cath 9/28/17: RECOMMENDATIONS: Multivessel bypass surgery, aortic valve replacement and possible maze procedure or appendage removal for chronic atrial fibrillation. Findings:      1. 30% distal left main disease. 2. Severe 100% occluded proximal LAD disease. 3. Severe ostial disease of more than 80%. 4. Moderate to severe disease in PDA and PLV branches of the RCA. -Chronic atrial fibrillation  -Chronic OAC therapy due to Hx DVT  -Bilateral chronic non-occlusive DVT's by LE doppler 9/29/17   -Aortic stenosis with mean gradient of 37 mmHg with mildly low EF by cath 9/28/17  -Moderate pulmonary hypertension with pulmonary vascular resistance of 4.425 Wood units consistent with the combination etiology of left ventricular dysfunction with left ventricular end-diastolic pressure of 31 mmHg, and sleep apnea and lung disease (Cath 9/28/17).   -HTN  -Documented Hx DM, although pt denies and states A1c was 4.9     Plan:     -Agree with proceeding to surgery with CTS team as long as remainder of pre-operative tests without contraindication  -Continue Coreg, Lasix, ASA, Pravastatin. Lisinopril d/c by cardiothoracic surgery team.  -Holding Coumadin pre-op, on Lovenox  -Check lipid panel, A1c  -Echo on Monday     History of Present Illness: This is a 78 y.o. male admitted for CAD. Patient complains of:  Abnormal cardiac cath, shortness of breath    Pt is a 79 yo M who has been experiencing increasing TREVINO associated with weight gain, which pt states has been mostly abdominal.  He cannot climb a flight of stairs without stopping due to his breathing. He denies orthopnea or PND. He states that he sometimes wakes up early in the morning due to coughing associated with post-nasal drip. He wears compression stockings and denies any new LE edema. He denies any chest pain. He had an elective cardiac catheterization by Dr. Khoi Jung at [facility] and was found to have severe multi-vessel disease with 100% proximal LAD occlusion with septal branches filling via collaterals, severe LCx ostial disease of > 80%, RCA with PDA ostial 40-50% disease and PLV ostial 60% napkin ring lesion. The cath also revealed moderate to severe aortic stenosis with mean gradient 37 mmHg. He was subsequently transferred here for possible CABG/AVR. Cardiac risk factors: obesity, male gender, hypertension      Review of Symptoms:  Except as stated above include:  Constitutional:  + weight gain. No fever, chills, diaphoresis.   Respiratory:  + TREVINO, no PND or orthopnea  Cardiovascular:  No chest pain, palpitations  Gastrointestinal: no abdominal pain, N/V, BRBPR  Genitourinary:  No hematuria  Musculoskeletal:  Pt denies new leg swelling, wears compression stockings  Neurological:  Negative  Dermatological:  + bruising  Endocrinological: Negative  Psychological:  Negative     Past Medical History:     Past Medical History:   Diagnosis Date    Aortic stenosis 9/29/2017    Bilateral artificial lens implant 1997, 1999    Chronic anticoagulation 9/29/2017    Chronic atrial fibrillation ([facility] [address] 9/29/2017    Coronal hypospadias 9/29/2017    Coronary artery disease involving native coronary artery of native heart without angina pectoris 9/28/2017    Diabetes mellitus with no complication ([address] 7/05/9994    Last Assessment & Plan:  No apparent diabetic retinopathy. . Pt told to keep regular primary care and eye appointments to reduce the risk of visual loss from diabetic eye complications, and to follow up immediately for changes in vision.  Diabetic retinopathy associated with type 2 diabetes mellitus ([address] 9/29/2017    Fluid overload     Gout 9/29/2017    Lesion of soft palate 7/29/2017    ENT planned biopsy    Pseudophakia of both eyes 9/29/2017    Last Assessment & Plan:  Vision stable. IOL's well centered. Pt pleased with visual result post-op OD 1999 and OS 1997. To call if any visual changes.  Retinal hole or tear 6/12/2017    Last Assessment & Plan:  [facility] well surrounded. Stable.   Will follow    Tricuspid regurgitation 9/29/2017         Social History:     Social History     Social History    Marital status:      Spouse name: N/A    Number of children: N/A    Years of education: N/A     Social History Main Topics    Smoking status: Former Smoker    Smokeless tobacco: Never Used    Alcohol use Yes    Drug use: None    Sexual activity: Yes     Other Topics Concern    None     Social History Narrative    None        Family History:     Family History   Problem Relation Age of Onset    Family history unknown: Yes        Medications:   No Known Allergies     Current Facility-Administered Medications   Medication Dose Route Frequency    sodium chloride (NS) flush 5-10 mL  5-10 mL IntraVENous Q8H    sodium chloride (NS) flush 5-10 mL  5-10 mL IntraVENous PRN    hydrALAZINE (APRESOLINE) 20 mg/mL injection 10 mg  10 mg IntraVENous Q6H PRN    aspirin delayed-release tablet 81 mg  81 mg Oral DAILY    carvedilol (COREG) tablet 3.125 mg  3.125 mg Oral BID WITH MEALS    enoxaparin (LOVENOX) injection 145 mg  145 mg SubCUTAneous Q12H    gabapentin (NEURONTIN) capsule 600 mg  600 mg Oral TID    pravastatin (PRAVACHOL) tablet 40 mg  40 mg Oral QHS    ondansetron (ZOFRAN) injection 4 mg  4 mg IntraVENous Q4H PRN    insulin lispro (HUMALOG) injection   SubCUTAneous AC&HS    glucose chewable tablet 16 g  4 Tab Oral PRN    glucagon (GLUCAGEN) injection 1 mg  1 mg IntraMUSCular PRN    dextrose (D50W) injection syrg 12.5-25 g  25-50 mL IntraVENous PRN    traMADol (ULTRAM) tablet 50 mg  50 mg Oral Q6H PRN    furosemide (LASIX) tablet 40 mg  40 mg Oral BID    potassium chloride (KLOR-CON) packet 20 mEq  20 mEq Oral BID WITH MEALS         Physical Exam:     Visit Vitals    BP (!) 148/103 (BP 1 Location: Right arm, BP Patient Position: At rest)    Pulse 96    Temp 96.7 °F (35.9 °C)    Resp 21    SpO2 95%     BP Readings from Last 3 Encounters:   09/29/17 (!) 148/103   09/28/17 (!) 141/99     Pulse Readings from Last 3 Encounters:   09/29/17 96   09/28/17 98     Wt Readings from Last 3 Encounters:   09/28/17 145.6 kg (321 lb)       General:  alert, cooperative, no distress, appears stated age  Lungs:  clear to auscultation bilaterally  Heart:  irregularly irregular rhythm  Abdomen:  Obese, non-tender  Extremities:  edema noted to bilateral lower extremities with compression stockings in place  Skin: Ecchymosis noted to right medial antecubital area  Neuro: alert, oriented x3, affect appropriate, no focal neurological deficits, moves all extremities well, no involuntary movements  Psych: non focal     Data Review:     Recent Labs      09/28/17   0740   WBC  5.7   HGB  14.8   HCT  43.8   PLT  113*     Recent Labs      09/28/17   0740   NA  141   K  4.2   CL  103   CO2  32   GLU  127*   BUN  19*   CREA  0.98   CA  9.1   INR  1.7*       Results for orders placed or performed during the hospital encounter of 09/29/17   EKG, 12 LEAD, INITIAL   Result Value Ref Range    Ventricular Rate 94 BPM    Atrial Rate 91 BPM    QRS Duration 108 ms    Q-T Interval 364 ms    QTC Calculation (Bezet) 455 ms    Calculated R Axis -7 degrees    Calculated T Axis 110 degrees    Diagnosis       Atrial fibrillation  Minimal voltage criteria for LVH, may be normal variant  Abnormal QRS-T angle, consider primary T wave abnormality  Abnormal ECG  When compared with ECG of 13-DEC-2011 10:43,  T wave inversion no longer evident in Inferior leads  Nonspecific T wave abnormality now evident in Lateral leads           Signed By: Danilo Velasquez PA-C     September 29, 2017

## 2017-09-29 NOTE — IP AVS SNAPSHOT
Karma Cochran 
 
 
 [address] [facility] [address] Patient: Neal Perales MRN: [id number]:[id number] Current Discharge Medication List  
  
START taking these medications Dose & Instructions Dispensing Information Comments Morning Noon Evening Bedtime  
 aspirin delayed-release 81 mg tablet Your last dose was: Your next dose is:    
   
   
 Dose:  81 mg Take 1 Tab by mouth daily. Quantity:  30 Tab Refills:  1  
     
   
   
   
  
 pravastatin 40 mg tablet Commonly known as:  PRAVACHOL Your last dose was: Your next dose is:    
   
   
 Dose:  40 mg Take 1 Tab by mouth nightly. Quantity:  30 Tab Refills:  2  
     
   
   
   
  
 tiotropium 18 mcg inhalation capsule Commonly known as:  Ashley Roll Your last dose was: Your next dose is:    
   
   
 Dose:  1 Cap Take 1 Cap by inhalation daily. Quantity:  30 Cap Refills:  2 CONTINUE these medications which have CHANGED Dose & Instructions Dispensing Information Comments Morning Noon Evening Bedtime  
 carvedilol 12.5 mg tablet Commonly known as:  Tram Locket What changed:   
- medication strength 
- how much to take - when to take this Your last dose was: Your next dose is:    
   
   
 Dose:  12.5 mg Take 1 Tab by mouth two (2) times daily (with meals). Quantity:  60 Tab Refills:  2  
     
   
   
   
  
 furosemide 40 mg tablet Commonly known as:  LASIX What changed:   
- medication strength 
- how much to take - when to take this 
- reasons to take this Your last dose was: Your next dose is:    
   
   
 Dose:  80 mg Take 2 Tabs by mouth two (2) times a day. Quantity:  120 Tab Refills:  2  
     
   
   
   
  
 traMADol 50 mg tablet Commonly known as:  ULTRAM  
What changed:  reasons to take this Your last dose was: Your next dose is:    
   
   
 Dose:  50 mg Take 1 Tab by mouth every six (6) hours as needed. Max Daily Amount: 200 mg. Quantity:  30 Tab Refills:  0 CONTINUE these medications which have NOT CHANGED Dose & Instructions Dispensing Information Comments Morning Noon Evening Bedtime  
 cetirizine 10 mg tablet Commonly known as:  ZYRTEC Your last dose was: Your next dose is: Take  by mouth. Refills:  0  
     
   
   
   
  
 gabapentin 600 mg tablet Commonly known as:  NEURONTIN Your last dose was: Your next dose is:    
   
   
 Dose:  600 mg Take 600 mg by mouth three (3) times daily. Refills:  0  
     
   
   
   
  
 warfarin 5 mg tablet Commonly known as:  COUMADIN Your last dose was: Your next dose is:    
   
   
 Dose:  5 mg Take 5 mg by mouth daily. Refills:  0 STOP taking these medications   
 doxycycline 20 mg tablet Commonly known as:  PERIOSTAT  
   
  
 lisinopril 10 mg tablet Commonly known as:  PRINIVIL, ZESTRIL  
   
  
 traZODone 50 mg tablet Commonly known as:  DESYREL  
   
  
 XARELTO 20 mg Tab tablet Generic drug:  rivaroxaban Where to Get Your Medications Information on where to get these meds will be given to you by the nurse or doctor. ! Ask your nurse or doctor about these medications  
  aspirin delayed-release 81 mg tablet  
 carvedilol 12.5 mg tablet  
 furosemide 40 mg tablet  
 pravastatin 40 mg tablet  
 tiotropium 18 mcg inhalation capsule  
 traMADol 50 mg tablet

## 2017-09-29 NOTE — ROUTINE PROCESS
Bedside and Verbal shift change report given to Ranulfo  (oncoming nurse) by Vero Robledo (offgoing nurse). Report included the following information SBAR, Kardex, MAR and Recent Results. SITUATION:    Code Status: Full Code   Reason for Admission: CAD   'Light-for-dates' infant with signs of fetal malnutrition   Chest pain    [facility] day: 0   Problem List:       Hospital Problems  Never Reviewed          Codes Class Noted POA    * (Principal)Aortic stenosis ICD-10-CM: I35.0  ICD-9-CM: 424.1  9/29/2017 Yes        Coronal hypospadias ICD-10-CM: Q54.0  ICD-9-CM: 752.61  9/29/2017 Yes        Chronic atrial fibrillation ([facility] (Chronic) ICD-10-CM: I48.2  ICD-9-CM: 427.31  9/29/2017 Yes        Chest pain ICD-10-CM: R07.9  ICD-9-CM: 786.50  9/29/2017 Yes        Fluid overload ICD-10-CM: E87.70  ICD-9-CM: 276.69  9/29/2017 Yes        Tricuspid regurgitation ICD-10-CM: I07.1  ICD-9-CM: 397.0  9/29/2017 Yes        Chronic anticoagulation (Chronic) ICD-10-CM: Z79.01  ICD-9-CM: V58.61  9/29/2017 Yes        Diabetic retinopathy associated with type 2 diabetes mellitus ([facility] ICD-10-CM: E11.319  ICD-9-CM: 250.50, 362.01  9/29/2017 Yes        Coronary artery disease involving native coronary artery of native heart without angina pectoris (Chronic) ICD-10-CM: I25.10  ICD-9-CM: 414.01  9/28/2017 Yes        Diabetes mellitus with no complication (HCC) (Chronic) ICD-10-CM: E11.9  ICD-9-CM: 250.00  6/12/2017 Yes    Overview Signed 9/29/2017  5:46 AM by Tulio Reynaga MD     Last Assessment & Plan:   No apparent diabetic retinopathy. . Pt told to keep regular primary care and eye appointments to reduce the risk of visual loss from diabetic eye complications, and to follow up immediately for changes in vision.                      BACKGROUND:    Past Medical History:   Past Medical History:   Diagnosis Date    Aortic stenosis 9/29/2017    Bilateral artificial lens implant 1997, 1999    Chronic anticoagulation 9/29/2017    Chronic atrial fibrillation ([address] 9/29/2017    Coronal hypospadias 9/29/2017    Coronary artery disease involving native coronary artery of native heart without angina pectoris 9/28/2017    Diabetes mellitus with no complication ([address] 3/47/8859    Last Assessment & Plan:  No apparent diabetic retinopathy. . Pt told to keep regular primary care and eye appointments to reduce the risk of visual loss from diabetic eye complications, and to follow up immediately for changes in vision.  Diabetic retinopathy associated with type 2 diabetes mellitus ([address] 9/29/2017    Fluid overload     Gout 9/29/2017    Lesion of soft palate 7/29/2017    ENT planned biopsy    Pseudophakia of both eyes 9/29/2017    Last Assessment & Plan:  Vision stable. IOL's well centered. Pt pleased with visual result post-op OD 1999 and OS 1997. To call if any visual changes.  Retinal hole or tear 6/12/2017    Last Assessment & Plan:  [facility] well surrounded. Stable.   Will follow    Tricuspid regurgitation 9/29/2017         Patient taking anticoagulants yes Lovenox    ASSESSMENT:    Changes in Assessment Throughout Shift: Stable with 1 run of 13 beats V-Tach Heart rate of 135/168     Patient has Central Line: no Reasons if yes:    Patient has Allison Cath: no Reasons if yes:       Last Vitals:     Vitals:    09/29/17 0726 09/29/17 0800 09/29/17 1143 09/29/17 1520   BP: (!) 167/95  (!) 148/103 140/89   Pulse: (!) 106  96 89   Resp: 22  21 20   Temp: 98.7 °F (37.1 °C)  96.7 °F (35.9 °C) 99.9 °F (37.7 °C)   SpO2: 94% 95% 95% 96%        IV and DRAINS (will only show if present)   Peripheral IV 09/29/17 Left Antecubital-Site Assessment: Clean, dry, & intact     WOUND (if present)   Wound Type:  none   Dressing present     Wound Concerns/Notes:  none     PAIN    Pain Assessment    Pain Intensity 1: 0 (09/29/17 1600)              Patient Stated Pain Goal: 0  o Interventions for Pain:  none  o Intervention effective: o Time of last intervention:    o Reassessment Completed: yes      Last 3 Weights: There were no vitals filed for this visit. Weight change:      INTAKE/OUPUT    Current Shift:      Last three shifts: 09/28 0701 - 09/29 1900  In: 473 [P.O.:473]  Out: 1851 [Urine:1850]     LAB RESULTS     Recent Labs      09/28/17   0740   WBC  5.7   HGB  14.8   HCT  43.8   PLT  113*        Recent Labs      09/29/17   1645  09/28/17   0740   NA  142  141   K  3.6  4.2   GLU  105*  127*   BUN  20*  19*   CREA  1.40*  0.98   CA  9.0  9.1   INR   --   1.7*       RECOMMENDATIONS AND DISCHARGE PLANNING     1. Pending tests/procedures/ Plan of Care or Other Needs: see chart     2. Discharge plan for patient and Needs/Barriers:     3. Estimated Discharge Date: Pending Posted on Whiteboard in Patients Room: yes      4. The patient's care plan was reviewed with the oncoming nurse. \"HEALS\" SAFETY CHECK      Fall Risk    Total Score: 2    Safety Measures: Safety Measures: Bed in low position, Bed/Chair-Wheels locked, Caregiver at bedside, Call light within reach    A safety check occurred in the patient's room between off going nurse and oncoming nurse listed above. The safety check included the below items  Area Items   H  High Alert Medications - Verify all high alert medication drips (heparin, PCA, etc.)   E  Equipment - Suction is set up for ALL patients (with yanker)  - Red plugs utilized for all equipment (IV pumps, etc.)  - WOWs wiped down at end of shift.  - Room stocked with oxygen, suction, and other unit-specific supplies   A  Alarms - Bed alarm is set for fall risk patients  - Ensure chair alarm is in place and activated if patient is up in a chair   L  Lines - Check IV for any infiltration  - Allison bag is empty if patient has a Allison   - Tubing and IV bags are labeled   S  Safety   - Room is clean, patient is clean, and equipment is clean. - Hallways are clear from equipment besides carts.    - Fall bracelet on for fall risk patients  - Ensure room is clear and free of clutter  - Suction is set up for ALL patients (with bravoker)  - Hallways are clear from equipment besides carts.    - Isolation precautions followed, supplies available outside room, sign posted     Liam Coleman

## 2017-09-29 NOTE — CONSULTS
CARDIOTHORACIC SURGERY CONSULTATION NOTE    9/29/2017  11:20 AM    I am seeing this patient for the first time in consultation at the request of Dr. Henna Stuart, with whom I have discussed the patient's history and test results, specifically the echocardiogram and cardiac catheterization. I have also reviewed his records and pertinent studies. Estela Valera is a 78 y.o. male who presents with dyspnea on exertion. It has been present for several months, and is increasing in frequency and severity, and is relieved by rest and does not occur at rest.  He is unable to climb a flight of stairs without stopping. He has also been experiencing lower extremity edema, but denies chest pain, chest pressure/discomfort, claudication, dizziness, fatigue, near-syncope, orthopnea, palpitations, paroxysmal nocturnal dyspnea, syncope, tachypnea. Cardiac risk factors include family history, dyslipidemia, diabetes mellitus, hypertension. There is no recent history of smoking/ tobacco exposure. Past Medical History:   Diagnosis Date    Aortic stenosis 9/29/2017    Chronic anticoagulation 9/29/2017    Chronic atrial fibrillation ([address] 9/29/2017    Coronal hypospadias 9/29/2017    Coronary artery disease involving native coronary artery of native heart without angina pectoris 9/28/2017    Diabetes mellitus with no complication ([address] 8/91/2182    Last Assessment & Plan:  No apparent diabetic retinopathy. . Pt told to keep regular primary care and eye appointments to reduce the risk of visual loss from diabetic eye complications, and to follow up immediately for changes in vision.  Fluid overload     Tricuspid regurgitation 9/29/2017       The past medical history is also notable for a lower extremity DVT in the past as well as gout, cataracts, and arthritis. He also carries a diagnosis of sleep apnea though untreated.     Past Surgical History:   Procedure Laterality Date    HX CHOLECYSTECTOMY      HX KNEE REPLACEMENT      HX SHOULDER REPLACEMENT       Present medications include   Current Facility-Administered Medications   Medication Dose Route Frequency Provider Last Rate Last Dose    sodium chloride (NS) flush 5-10 mL  5-10 mL IntraVENous Q8H Obed Vega MD   10 mL at 09/29/17 0600    sodium chloride (NS) flush 5-10 mL  5-10 mL IntraVENous PRN Giancarlo López MD        hydrALAZINE (APRESOLINE) 20 mg/mL injection 10 mg  10 mg IntraVENous Q6H PRN Lorraine Wise MD   10 mg at 09/29/17 0405    0.9% sodium chloride infusion  50 mL/hr IntraVENous CONTINUOUS Lorraine Wise MD 50 mL/hr at 09/29/17 0600 50 mL/hr at 09/29/17 0600    aspirin delayed-release tablet 81 mg  81 mg Oral DAILY Lorraine Wise MD   81 mg at 09/29/17 2929    carvedilol (COREG) tablet 3.125 mg  3.125 mg Oral BID WITH MEALS Lorraine Wise MD   3.125 mg at 09/29/17 2595    enoxaparin (LOVENOX) injection 145 mg  145 mg SubCUTAneous Q12H Lorraine Wise MD   145 mg at 09/29/17 3259    gabapentin (NEURONTIN) capsule 600 mg  600 mg Oral TID Lorraine Wise MD   600 mg at 09/29/17 5988    lisinopril (PRINIVIL, ZESTRIL) tablet 10 mg  10 mg Oral DAILY Lorraine Wise MD   10 mg at 09/29/17 7517    pravastatin (PRAVACHOL) tablet 40 mg  40 mg Oral QHS Lorraine Wise MD        [facility]) injection 4 mg  4 mg IntraVENous Q4H PRN Lorraine Wise MD        insulin lispro (HUMALOG) injection   SubCUTAneous AC&HS Lorraine Wise MD   Stopped at 09/29/17 0730    glucose chewable tablet 16 g  4 Tab Oral PRN Lorraine Wise MD        glucagon (GLUCAGEN) injection 1 mg  1 mg IntraMUSCular PRN Lorraine Wise MD        dextrose (D50W) injection syrg 12.5-25 g  25-50 mL IntraVENous PRN Lorraine Wise MD        heparin (porcine) injection 5,000 Units  5,000 Units SubCUTAneous Q8H Lorraine Wise MD   Stopped at 09/29/17 0700    traMADol (ULTRAM) tablet 50 mg 50 mg Oral Q6H PRN Solis Donohue MD           Drug allergies: No Known Allergies    Family History: There is a history of heart disease in the family. Social History: Smoking history as above. He denies alcohol use. He lives with his spouse in a trailer alone and is a retired . REVIEW OF SYSTEMS:   Constitutional:        positive for significant weight gain of 20 - 30 lbs in the past month which was relieved somewhat with diuresis       negative for fevers, chills    Integumentary:       negative for recent rashes  Eyes:        negative for diplopia, transient visual loss  ENMT:        negative for hearing loss        negative for sinus congestion        negative for sore throat  Respiratory:        positive for chronic cough, non-productive        negative for recent productive cough  Cardiovascular: as noted above in history   Gastrointestinal:        negative for abdominal pain        negative for diarrhea       negative for constipation  Genitourinary:        negative for dysuria  Musculoskeletal:        negative for chronic back pain        negative for joint pain        negative for recent fractures        negative for leg cramping  Hematologic / Lymphatic:        positive for easy bruisability   Neurological:       negative for headaches        negative for seizures  Psychiatric:        negative for anxiety        negative for depression    PHYSICAL EXAMINATION:  Vital signs:   BP Readings from Last 3 Encounters:   17 (!) 167/95   17 (!) 141/99     Temp (24hrs), Av.4 °F (36.9 °C), Min:98.1 °F (36.7 °C), Max:98.7 °F (37.1 °C)    Wt Readings from Last 3 Encounters:   17 145.6 kg (321 lb)     Ht Readings from Last 3 Encounters:   17 5' 11\" (1.803 m)     General appearance:  He appeared well-nourished and of large build, morbidly obese with a large abdominal pannus  Integument: The skin was warm and dry and had fair turgor.   There were severe lower extremity venous stasis changes bilaterally. Head and Neck: The head was normocephalic and was atraumatic. The neck was supple with good range of motion. Thyromegaly was absent, and cervical and supraclavicular lymphadenopathy were absent. EENMT: Conjunctivae were injected. The sclerae were anicteric, and the nares were patent bilaterally. Oral mucosa were moist.  The tongue was in the midline. Dentition was good. Back: CVA and vertebral tenderness were absent. He was kyphotic. Lungs: Respirations were not labored, and the lungs were clear to auscultation bilaterally, without rales, without rhonchi, and without wheezes. Heart: The rate and rhythm were irregular, without an S3, without JVD, and with a murmur grade III/VI harsh systolic murmur heard best over the right upper sternal bordern with radiation to the neck. The PMI was not displaced laterally. Abdomen: The abdomen was globoid and was soft and was not tender, with good bowel sounds, and hepatomegaly was as well as splenomegaly and pulsatile masses in the abdomen were difficult to assess given his large pannus. Vascular: Carotid pulses were 1+ bilaterally, and radial pulses were 1+ bilaterally. Pedal pulses were present bilaterally. Varicose veins were absent in both legs. Extremities: Clubbing was absent, cyanosis was present, and pedal edema was marked. Neurologic: He was alert and oriented, and was a good historian. Strength and motion appeared normal and symmetrical in his extremities. Psychiatric: He was pleasant, calm, and had a normal affect.     LABORATORIES:   Lab Results   Component Value Date/Time    WBC 5.7 09/28/2017 07:40 AM    HCT 43.8 09/28/2017 07:40 AM     (L) 09/28/2017 07:40 AM      Lab Results   Component Value Date/Time     09/28/2017 07:40 AM    K 4.2 09/28/2017 07:40 AM     09/28/2017 07:40 AM    CO2 32 09/28/2017 07:40 AM     (H) 09/28/2017 07:40 AM    BUN 19 (H) 09/28/2017 07:40 AM CREA 0.98 09/28/2017 07:40 AM     The echocardiogram images reportedly revealed severe pulmonary hypertension, moderate tricuspid regurgitation, and severe AS. I have also personally reviewed the cardiac catheterization images. The LVEDP was 32,  PA pressures were 88/47 with a mean of 63, and a mean aortic valve gradient of 38. PVR was 4.4 Finn units. Coronary arteriography was noted to reveal the following atheromatous plaque stenoses in the native coronary arteries:   proximal LAD occluded with filling of the distal vessel by collaterals  proximal LCX 70%  posterior descending branch of the right coronary artery 60%  Posterolateral branch of the RCA 90%. Impression:  Diagnoses:  1. Coronary artery disease (severe)  2. severe aortic stenosis (non-rheumatic)  3. moderate tricuspid regurgitation (non-rheumatic)  4. Severe pulmonary hypertension  5. Cardiomyopathy  6. Congestive heart failure, systolic, Acute on Chronic  7. Essential hypertension  8. diabetes mellitus type-2  9. hypercholesterolemia  10. Atrial fibrillation (chronic)  11. severe peripheral venous disease  12. Cerebrovascular disease    With 3-vessel CAD and severe AS, there would be potential benefit from surgical coronary revascularization utilizing a left internal mammary artery and saphenous vein grafts and aortic valve replacement using a bioprosthesis valve based on his age and the durability and anticoagulation needs of the two valves. I am not sure if adding bilateral PVI to the procedure will benefit him much given the chronicity of the afib. I have discussed this operation in detail with him, along with the alternatives, which would include medical therapy and / or PCI and TAVR.   I also outlined the risks and benefits of the surgery, which would include, but not be limited to, operative mortality, stroke, pneumonia, renal failure, infection, bleeding and need for re-exploration, perioperative myocardial infarction, postoperative arrhythmias, sternal dehiscence, hand dysesthesias and/or weakness, blood component transfusions, and the need for a permanent pacemaker postoperatively. The estimated STS risks were calculated for this patient and were discussed with the patient as outlined above. I am most concerned about his severe pulmonary hypertension and it potential impact on his mortality and ability to wean successfully from surgery, especially if the echo shows RV dysfunction. It is unclear why hiw PA pressures are so high, although it is likely multifactorial (AS, MICHEAL, CAD, poor LV function). Recommendations:  He needs to work-up to determine more clearly his operative risk and whether open AVR with CABG (and possible PVI) will be offered, or if TAVR with PCI would be the better alternative. I suspect the former will be the best intervention, though the risks need to be weighed against the benefits. He will need the additional preoperative tests, which I will order, to best assess his operability:  Liver function tests  HbA1c  Urinalysis  EKG  CXR  Chest CT scan (to assess size and degree of calcification of the ascending aorta)  Echocardiogram  Carotid duplex scanning  Ankle-brachial indices  Venous mapping and marking  Pulmonary function testing  Room air arterial blood gas  Cardiology consultation    Would also discontinue his ACE-inhibitor in case he needs open heart surgery. I would also continue to aggressively diurese him and then obtain the echo in three days to better assess his RV and tricuspid valve and pulmonary hypertension after fluid removal.    Thank you for involving me in his care.     Ricarda Urrutia MD

## 2017-09-29 NOTE — PROGRESS NOTES
Problem: Falls - Risk of  Goal: *Absence of Falls  Document Fatimah Fall Risk and appropriate interventions in the flowsheet.    Outcome: Progressing Towards Goal  Fall Risk Interventions:  Mobility Interventions: Assess mobility with egress test, Bed/chair exit alarm, OT consult for ADLs, PT Consult for mobility concerns, Patient to call before getting OOB, PT Consult for assist device competence, Strengthening exercises (ROM-active/passive), Communicate number of staff needed for ambulation/transfer           Medication Interventions: Assess postural VS orthostatic hypotension, Bed/chair exit alarm, Teach patient to arise slowly, Patient to call before getting OOB

## 2017-09-29 NOTE — PROGRESS NOTES
[facility]  Progress Note    Patient: Osvaldo Rodriguez Age: 78 y.o. : 1950 MR#: [id number] SSN: [id number]  Date: 2017     Subjective:     No c/o. Seen with family member @ bedside. Denies F/C, N/V, CP, SOB. In good spirits. Assessment/Plan:   1. Severe AStenosis, severe 3v CADz - no CP or SOB presently. CTSurgery eval'ing: CABG vs TAVR c PCI. Will f/u on testing and recs. On tx dose Lovenox, ASA, BBlocker, statin and Lasix. 2. DM - SSI. 3. HTN - BP a little elevated, following for now. 4. Acute on chronic systolic CHF exac - appears compensated presently. 5. Hyperlipidemia - statin. 6. Chronic AFib - rate controlled. ASA, tx dose Lovenox. 7. Chronic, nonocclusive BLE DVT - as above, on tx dose Lovenox. Additional Notes:      Case discussed with:  [x]Patient  [x]Family  []Nursing  []Case Management  DVT Prophylaxis:  [x]Lovenox  []Hep SQ  []SCDs  []Coumadin   []On Heparin gtt    Objective:   VS:   Visit Vitals    BP (!) 148/103 (BP 1 Location: Right arm, BP Patient Position: At rest)    Pulse 96    Temp 96.7 °F (35.9 °C)    Resp 21    SpO2 95%      Tmax/24hrs: Temp (24hrs), Av °F (36.7 °C), Min:96.7 °F (35.9 °C), Max:98.7 °F (37.1 °C)    Intake/Output Summary (Last 24 hours) at 17 1435  Last data filed at 17 0940   Gross per 24 hour   Intake              473 ml   Output              851 ml   Net             -378 ml       General:  Awake, alert, NAD. Cardiovascular:  iRRR c 3/6 PRERNA. Pulmonary:  CTA B.  GI:  Soft, obese, NT/ND, NABS. Extremities:  No CT, 2+ BLE chronic edema.    Additional:      Labs:    Recent Results (from the past 24 hour(s))   GLUCOSE, POC    Collection Time: 17  9:08 PM   Result Value Ref Range    Glucose (POC) 116 (H) 70 - 110 mg/dL   GLUCOSE, POC    Collection Time: 17  6:29 AM   Result Value Ref Range    Glucose (POC) 122 (H) 70 - 110 mg/dL   GLUCOSE, POC    Collection Time: 17 11:13 AM   Result Value Ref Range    Glucose (POC) 136 (H) 70 - 110 mg/dL   EKG, 12 LEAD, INITIAL    Collection Time: 09/29/17 12:29 PM   Result Value Ref Range    Ventricular Rate 94 BPM    Atrial Rate 91 BPM    QRS Duration 108 ms    Q-T Interval 364 ms    QTC Calculation (Bezet) 455 ms    Calculated R Axis -7 degrees    Calculated T Axis 110 degrees    Diagnosis       Atrial fibrillation  Minimal voltage criteria for LVH, may be normal variant  Abnormal QRS-T angle, consider primary T wave abnormality  Abnormal ECG  When compared with ECG of 13-DEC-2011 10:43,  T wave inversion no longer evident in Inferior leads  Nonspecific T wave abnormality now evident in Lateral leads     POC G3    Collection Time: 09/29/17  2:20 PM   Result Value Ref Range    Device: ROOM AIR      FIO2 (POC) 21 %    pH (POC) 7.480 (H) 7.35 - 7.45      pCO2 (POC) 39.9 35.0 - 45.0 MMHG    pO2 (POC) 61 (L) 80 - 100 MMHG    HCO3 (POC) 30.0 (H) 22 - 26 MMOL/L    sO2 (POC) 94 92 - 97 %    Base excess (POC) 6 mmol/L    Allens test (POC) YES      Site LEFT RADIAL      Patient temp.  35.9      Specimen type (POC) ARTERIAL      Performed by Chana Other        Signed By: Dima Guzmán MD     September 29, 2017 2:35 PM

## 2017-09-29 NOTE — PROCEDURES
[address] [facility] CATH LAB    Name:  Matti De Guzman  MR#:  [id number]  :  1950  Account #:  [de-identified]  Date of Adm:  2017  Date of Service:  2017      REASON FOR PROCEDURE: AFib, shortness of breath, pulmonary  hypertension, aortic stenosis. ESTIMATED BLOOD LOSS: Less than 70 mL. SPECIMENS REMOVED: None. PROCEDURES PERFORMED:  1. Left heart catheterization. 2. Right heart catheterization. 3. Selective coronary angiogram.    COUNSELING: Risks, benefits and alternatives were discussed in  detail with the patient and wife. They both agreed to proceed. PROCEDURE AND TECHNIQUE: The patient was brought to the  cardiac catheterization lab in a fasting and nonsedated state. The  patient was prepped and draped in the usual sterilized fashion. The  right radial area was anesthetized with local anesthetic. A 6-Angolan  sheath was placed under fluoroscopic guidance without any  complication, and a 6-Angolan venous sheath was placed in the  antecubital venous access via modified Seldinger technique. A 6-  [place] catheter was advanced under fluoroscopic guidance. Right-sided intracardiac pressures were measured. Lucinda and  thermodilution cardiac output was measured and calculated. Then, the right radial area was anesthetized with local anesthetic. A 6-  Angolan arterial sheath was placed. A 5-Angolan JR4 catheter was  advanced under fluoroscopic guidance. The patient with severe  subclavian tortuosity. Over the wire, I was able to cross the aortic  valve, but the catheter came out of the LV without measuring the LV to  aorta gradient. Due to severe subclavian tortuosity and lack of  torque in the catheters, the procedure was converted into a  transfemoral approach.  The right groin area was anesthetized with  local anesthetic and a 5-Angolan JR5 guide catheter was attempted to  engage the RCA was unsuccessful, 3DRC was attempted, then finally  5-Angolan JR5 catheter was able to engage the right coronary artery. Selective coronary angiography was performed and a 5-Togolese JR5  catheter was used to perform the left heart catheterization and then  double-lumen Eryn Pears catheter was advanced under fluoroscopic  guidance. Simultaneous LV and aortic pressures were measured in the  setting of AFib and then a 6-Togolese JL4, JL5 and then 6-Togolese JL6   catheter was also attempted and finally a 6-Togolese JL6 catheter was  able to engage the left coronary system. Multiple angiographic views  were acquired. The patient remained hemodynamically stable. Right  femoral angiography was performed and Perclose device was used to  close the right femoral arteriotomy. The patient remained  hemodynamically stable. FINDINGS: This is a right dominant coronary anatomy. LVEDP is 32  mmHg and there is a significant gradient from LV to aorta. The mean  gradient is 37 mmHg and the peak-to-peak gradient is 50.58 mmHg,  suggestive of moderate to severe aortic stenosis. CORONARY ANATOMY:  1. Left main artery is a large vessel, patent in its ostium, body. There is  a 30% disease in the distal left main artery. 2. LAD is 100% occluded in the proximal area, a very calcified vessel,  occluded diagonal branch as well. Septal branches filling via collaterals. 3. Left circumflex has an ostial tight lesion, gives rise to a moderate-  sized OM branch which is patent with moderate tortuosity. 4. Right coronary artery is a large vessel, patent in its ostium, proximal,  mid area with mild diffuse luminal irregularity, gives rise to a large PLV  branch and small PDA branches. The PDA branch has ostial 40% to  50% disease and the PLV branch has ostial 60% napkin ring lesions. PLV branch is giving a large collateral to the LAD system and left  circumflex system. FINAL IMPRESSION OF CORONARY ANATOMY:  1. 30% distal left main disease. 2. Severe 100% occluded proximal LAD disease.   3. Severe ostial disease of more than 80%. 4. Moderate to severe disease in PDA and PLV branches of the RCA. HEMODYNAMICS: LV was 204 x 7 with LVEDP of 32 mmHg. Aortic  pressure was 163/104. RIGHT HEART HEMODYNAMICS:  1. Pulmonary capillary wedge pressure mean was 33 mmHg, A-wave was 39  and V-wave was 43 mmHg. 2. PA pressure 88/47 with a mean PA pressure of 63 mmHg. 3. RV 85/13.  4. Right atrial mean pressure is 30 mmHg, A-wave was 40 and V-wave  was 32 mmHg. O2 SATURATION: PA saturation was 72.3. Aortic saturation was  95.4%. Lucinda cardiac output was 6.79 liters per minute and Lucinda cardiac index  was 2.64 liters per minute per square meter. THERMODILUTION: Cardiac output was 6.70 liters per minute and  cardiac index was 2.60 liters per minute per square meter. PVR was moderate consistent with 4.425 Wood units and pulmonary  hypertension is a combination of significant LV dysfunction, AFib, and  possible sleep apnea or lung disease. FINAL IMPRESSION:  1. Severe multivessel coronary artery disease as described above. 2. Moderate to severe aortic stenosis with mean gradient of 37 mmHg  with mildly low ejection fraction. 3. Moderate pulmonary hypertension with pulmonary vascular  resistance of 4.425 Wood units consistent with the combination  etiology of left ventricular dysfunction with left ventricular end-diastolic  pressure of 31 mmHg, and sleep apnea and lung disease. 4. Severe tricuspid regurgitation. 5. Chronic atrial fibrillation on anticoagulation. RECOMMENDATIONS: Multivessel bypass surgery, aortic valve  replacement and possible maze procedure or appendage removal for  chronic atrial fibrillation. Treatment plan was discussed with the patient  and wife. Discussed with Dr. Hair Zaragoza at [facility], who was  kind enough to accept the patient. The patient will be transferred over  there.  I will continue the home medications including diuretic, blood  pressure medicine; continue with beta blocker, isosorbide, aspirin, and  will resume heparin when it is permissive before transfer.         MD Jazmine Keller / Senia López  D:  09/28/2017   17:26  T:  09/28/2017   20:03  Job #:  [id number]

## 2017-09-29 NOTE — ROUTINE PROCESS
Bedside shift change report given to [address] [place] [address] (oncoming nurse) by Jaz Cummins (offgoing nurse). Report included the following information SBAR, Kardex, ED Summary, Intake/Output, Cardiac Rhythm Uncontrolled A-Fib and Alarm Parameters .  Will continue to monitor

## 2017-09-29 NOTE — IP AVS SNAPSHOT
Merlin Laroche 
 
 
 [address] [facility] [address] Patient: Colton Galaviz MRN: [id number] You are allergic to the following No active allergies Recent Documentation Height Weight BMI Smoking Status 1.803 m 148.8 kg 45.75 kg/m2 Former Smoker Emergency Contacts Name Discharge Info Relation Home Work Mobile Melissa Dsouza DISCHARGE CAREGIVER [3] Spouse [3] [phone] About your hospitalization You were admitted on:  2017 You last received care in the:  [address] [facility] [address] [facility]. 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 
[phone] Deisy Dub [facility] Surgeons In 1 week CAD, aortic valve stenosis, moderate/severe pulmonary hypertension [facility] [address] 
[address] 7 [facility] 
[phone] Current Discharge Medication List  
  
START taking these medications Dose & Instructions Dispensing Information Comments Morning Noon Evening Bedtime  
 aspirin delayed-release 81 mg tablet Your last dose was: Your next dose is:    
   
   
 Dose:  81 mg Take 1 Tab by mouth daily. Quantity:  30 Tab Refills:  1  
     
   
   
   
  
 pravastatin 40 mg tablet Commonly known as:  PRAVACHOL Your last dose was: Your next dose is:    
   
   
 Dose:  40 mg Take 1 Tab by mouth nightly. Quantity:  30 Tab Refills:  2  
     
   
   
   
  
 tiotropium 18 mcg inhalation capsule Commonly known as:  Juan Jose Garg Your last dose was: Your next dose is:    
   
   
 Dose:  1 Cap Take 1 Cap by inhalation daily. Quantity:  30 Cap Refills:  2 CONTINUE these medications which have CHANGED Dose & Instructions Dispensing Information Comments Morning Noon Evening Bedtime  
 carvedilol 12.5 mg tablet Commonly known as:  Jestine Pellet What changed:   
- medication strength 
- how much to take - when to take this Your last dose was: Your next dose is:    
   
   
 Dose:  12.5 mg Take 1 Tab by mouth two (2) times daily (with meals). Quantity:  60 Tab Refills:  2  
     
   
   
   
  
 furosemide 40 mg tablet Commonly known as:  LASIX What changed:   
- medication strength 
- how much to take - when to take this 
- reasons to take this Your last dose was: Your next dose is:    
   
   
 Dose:  80 mg Take 2 Tabs by mouth two (2) times a day. Quantity:  120 Tab Refills:  2  
     
   
   
   
  
 traMADol 50 mg tablet Commonly known as:  ULTRAM  
What changed:  reasons to take this Your last dose was: Your next dose is:    
   
   
 Dose:  50 mg Take 1 Tab by mouth every six (6) hours as needed. Max Daily Amount: 200 mg. Quantity:  30 Tab Refills:  0 CONTINUE these medications which have NOT CHANGED Dose & Instructions Dispensing Information Comments Morning Noon Evening Bedtime  
 cetirizine 10 mg tablet Commonly known as:  ZYRTEC Your last dose was: Your next dose is: Take  by mouth. Refills:  0  
     
   
   
   
  
 gabapentin 600 mg tablet Commonly known as:  NEURONTIN Your last dose was: Your next dose is:    
   
   
 Dose:  600 mg Take 600 mg by mouth three (3) times daily. Refills:  0  
     
   
   
   
  
 warfarin 5 mg tablet Commonly known as:  COUMADIN Your last dose was: Your next dose is:    
   
   
 Dose:  5 mg Take 5 mg by mouth daily. Refills:  0 STOP taking these medications   
 doxycycline 20 mg tablet Commonly known as:  PERIOSTAT  
   
  
 lisinopril 10 mg tablet Commonly known as:  PRINIVIL, ZESTRIL  
   
  
 traZODone 50 mg tablet Commonly known as:  DESYREL  
   
  
 XARELTO 20 mg Tab tablet Generic drug:  rivaroxaban Where to Get Your Medications Information on where to get these meds will be given to you by the nurse or doctor. ! Ask your nurse or doctor about these medications  
  aspirin delayed-release 81 mg tablet  
 carvedilol 12.5 mg tablet  
 furosemide 40 mg tablet  
 pravastatin 40 mg tablet  
 tiotropium 18 mcg inhalation capsule  
 traMADol 50 mg tablet Discharge Instructions ProductGram Activation Thank you for requesting access to ProductGram. Please follow the instructions below to securely access and download your online medical record. ProductGram allows you to send messages to your doctor, view your test results, renew your prescriptions, schedule appointments, and more. How Do I Sign Up? 1. In your internet browser, go to [email or web address] 
2. Click on the First Time User? Click Here link in the Sign In box.  You will be redirect to the New Member Sign Up page. 3. Enter your Earthineer Access Code exactly as it appears below. You will not need to use this code after youve completed the sign-up process. If you do not sign up before the expiration date, you must request a new code. Earthineer Access Code: [email or web address] Expires: 2017  4:36 PM (This is the date your Earthineer access code will ) 4. Enter the last four digits of your Social Security Number (xxxx) and Date of Birth (mm/dd/yyyy) as indicated and click Submit. You will be taken to the next sign-up page. 5. Create a Earthineer ID. This will be your Earthineer login ID and cannot be changed, so think of one that is secure and easy to remember. 6. Create a Earthineer password. You can change your password at any time. 7. Enter your Password Reset Question and Answer. This can be used at a later time if you forget your password. 8. Enter your e-mail address. You will receive e-mail notification when new information is available in [address] 9. Click Sign Up. You can now view and download portions of your medical record. 10. Click the Download Summary menu link to download a portable copy of your medical information. Additional Information If you have questions, please visit the Frequently Asked Questions section of the Earthineer website at [email or web address] Remember, Earthineer is NOT to be used for urgent needs. For medical emergencies, dial 911. Patient armband removed and shredded DISCHARGE SUMMARY from Nurse The following personal items are in your possession at time of discharge: 
 
Dental Appliances: None Visual Aid: None Home Medications: Sent home Jewelry: None Clothing: At bedside, Footwear, [address], Shorts Other Valuables: None Personal Items Sent to Safe: none PATIENT INSTRUCTIONS: 
 
 
F-face looks uneven A-arms unable to move or move unevenly S-speech slurred or non-existent T-time-call 911 as soon as signs and symptoms begin-DO NOT go Back to bed or wait to see if you get better-TIME IS BRAIN. Warning Signs of HEART ATTACK Call 911 if you have these symptoms: 
? Chest discomfort. Most heart attacks involve discomfort in the center of the chest that lasts more than a few minutes, or that goes away and comes back. It can feel like uncomfortable pressure, squeezing, fullness, or pain. ? Discomfort in other areas of the upper body. Symptoms can include pain or discomfort in one or both arms, the back, neck, jaw, or stomach. ? Shortness of breath with or without chest discomfort. ? Other signs may include breaking out in a cold sweat, nausea, or lightheadedness. Don't wait more than five minutes to call [address]! Fast action can save your life. Calling 911 is almost always the fastest way to get lifesaving treatment. Emergency Medical Services staff can begin treatment when they arrive  up to an hour sooner than if someone gets to the hospital by car. The discharge information has been reviewed with the patient. The patient verbalized understanding. Discharge medications reviewed with the patient and appropriate educational materials and side effects teaching were provided. Discharge Instructions Attachments/References AORTIC VALVE STENOSIS (ENGLISH) HYPERVOLEMIA: GENERAL INFO (ENGLISH) ASPIRIN (BY MOUTH) (ENGLISH) PRAVASTATIN (BY MOUTH) (ENGLISH) TIOTROPIUM (BY BREATHING) (ENGLISH) Discharge Orders None [facility] Announcement We are excited to announce that we are making your provider's discharge notes available to you in SensopiaMiami.   You will see these notes when they are completed and signed by the physician that discharged you from your recent hospital stay. If you have any questions or concerns about any information you see in Cortica, please call the Health Information Department where you were seen or reach out to your Primary Care Provider for more information about your plan of care. Introducing [facility]! Aneta Wade introduces Cortica patient portal. Now you can access parts of your medical record, email your doctor's office, and request medication refills online. 1. In your internet browser, go to [email or web address] 2. Click on the First Time User? Click Here link in the Sign In box. You will see the New Member Sign Up page. 3. Enter your Cortica Access Code exactly as it appears below. You will not need to use this code after youve completed the sign-up process. If you do not sign up before the expiration date, you must request a new code. · Cortica Access Code: [email or web address] Expires: 12/26/2017  4:36 PM 
 
4. Enter the last four digits of your Social Security Number (xxxx) and Date of Birth (mm/dd/yyyy) as indicated and click Submit. You will be taken to the next sign-up page. 5. Create a Cortica ID. This will be your Cortica login ID and cannot be changed, so think of one that is secure and easy to remember. 6. Create a Cortica password. You can change your password at any time. 7. Enter your Password Reset Question and Answer. This can be used at a later time if you forget your password. 8. Enter your e-mail address. You will receive e-mail notification when new information is available in [address] 9. Click Sign Up. You can now view and download portions of your medical record. 10. Click the Download Summary menu link to download a portable copy of your medical information. If you have questions, please visit the Frequently Asked Questions section of the Cortica website. Remember, Cortica is NOT to be used for urgent needs. For medical emergencies, dial 911. Now available from your iPhone and Android! General Information Please provide this summary of care documentation to your next provider. Patient Signature:  ____________________________________________________________ Date:  ____________________________________________________________  
  
[facility] Perfect Provider Signature:  ____________________________________________________________ Date:  ____________________________________________________________ More Information Aortic Valve Stenosis: Care Instructions Your Care Instructions Having aortic valve stenosis means that the valve between your heart and the large blood vessel that carries blood to the body (aorta) has narrowed. That forces the heart to pump harder to get enough blood through the valve. As stenosis gets worse, the valve gets narrower. This can cause symptoms. Symptoms include chest pain, dizziness, fainting, or shortness of breath. Surgery can fix the valve. The most common surgery is to replace the aortic valve. Your doctor may want to delay valve replacement until you have severe narrowing. Follow-up care is a key part of your treatment and safety. Be sure to make and go to all appointments, and call your doctor if you are having problems. It's also a good idea to know your test results and keep a list of the medicines you take. How can you care for yourself at home? · Be safe with medicines. Take your medicines exactly as prescribed. Call your doctor if you think you are having a problem with your medicine. You will get more details on the specific medicines your doctor prescribes. · Plan your meals so that you are eating heart-healthy foods. ¨ Eat a variety of foods daily. Fresh fruits and vegetables and whole grains are good choices. ¨ Limit your fat intake, especially saturated and trans fat. ¨ Limit salt (sodium). ¨ Increase fiber in your diet. ¨ Limit alcohol. · Be active. Ask your doctor what type and level of exercise is safe for you. Walking is a good choice. Your doctor may suggest that you join a cardiac rehabilitation program so that you can have help increasing your physical activity safely. · Do not smoke. Smoking can make aortic valve stenosis worse. If you need help quitting, talk to your doctor about stop-smoking programs and medicines. These can increase your chances of quitting for good. · Stay at a healthy weight. Lose weight if you need to. · Avoid colds and flu. Get a pneumococcal vaccine shot. If you have had one before, ask your doctor if you need another dose. Get a flu vaccine every year. · Take care of your teeth and gums. Get regular dental checkups. Good dental health is important because bacteria can spread from infected teeth and gums to the heart valves. When should you call for help? Call 911 anytime you think you may need emergency care. For example, call if: 
· You passed out (lost consciousness). · You have symptoms of a heart attack. These may include: ¨ Chest pain or pressure, or a strange feeling in the chest. 
¨ Sweating. ¨ Shortness of breath. ¨ Nausea or vomiting. ¨ Pain, pressure, or a strange feeling in the back, neck, jaw, or upper belly or in one or both shoulders or arms. ¨ Lightheadedness or sudden weakness. ¨ A fast or irregular heartbeat. After you call 911, the  may tell you to chew 1 adult-strength or 2 to 4 low-dose aspirin. Wait for an ambulance. Do not try to drive yourself. Call your doctor now or seek immediate medical care if: 
· You develop new symptoms of aortic valve stenosis, such as chest pain, dizziness, or shortness of breath. · You have new or increased shortness of breath. · You are dizzy or lightheaded, or you feel like you may faint. · You have sudden weight gain, such as more than 2 to 3 pounds in a day or 5 pounds in a week.  (Your doctor may suggest a different range of weight gain.) · You have increased swelling in your legs, ankles, or feet. Watch closely for changes in your health, and be sure to contact your doctor if: 
· You have trouble making healthy lifestyle changes. · You want more information about healthy lifestyle changes. Where can you learn more? Go to http://penny-rashaun.info/. Enter X438 in the search box to learn more about \"Aortic Valve Stenosis: Care Instructions. \" Current as of: March 17, 2017 Content Version: 11.3 © 5407-8251 Vibrant Energy. Care instructions adapted under license by Cryo-Innovation (which disclaims liability or warranty for this information). If you have questions about a medical condition or this instruction, always ask your healthcare professional. [address] any warranty or liability for your use of this information. Learning About Fluid Overload What is fluid overload? Fluid overload means that your body has too much water. The extra fluid in your body can raise your blood pressure and force your heart to work harder. It can also make it hard for you to breathe. Most of your body is made up of water. The body uses minerals like sodium and potassium to help organs such as your heart, kidneys, and liver balance how much water you need. For example, the heart pumps blood to move water around the body. And the kidneys work to get rid of the water that the body doesn't need. Health conditions like kidney disease, heart failure, and cirrhosis can cause fluid overload. Other things can cause extra fluid to build up. IV fluids, some medicines, too much salt (sodium) from food, and certain medical treatments can sometimes cause this fluid increase. What are the symptoms? Some of the most common symptoms are: 
· Gaining weight over a short period of time. · Swelling in the ankles or legs. · Shortness of breath. How is it treated? The goal of treatment is to remove the extra fluid in your body. Your treatment will depend on the cause. Your doctor may: · Give you medicines, such as diuretics (also called \"water pills\"). They help your body get rid of the extra fluid. · Restrict your fluid or salt intake. Follow-up care is a key part of your treatment and safety. Be sure to make and go to all appointments, and call your doctor if you are having problems. It's also a good idea to know your test results and keep a list of the medicines you take. Where can you learn more? Go to http://penny-rashaun.info/. Enter O110 in the search box to learn more about \"Learning About Fluid Overload. \" Current as of: January 12, 2017 Content Version: 11.3 © 2117-3235 [employer]. Care instructions adapted under license by Coinsetter (which disclaims liability or warranty for this information). If you have questions about a medical condition or this instruction, always ask your healthcare professional. [place] [address] any warranty or liability for your use of this information. Aspirin (By mouth) Aspirin (AS-pir-in) Treats pain, fever, and inflammation. May lower risk of heart attack and stroke. Brand Name(s): Ascriptin Regular Strength, Aspergum, Aspir Low, Aspirin Adult Low Dose, Karen Aspirin Children's, Karen Aspirin Regimen, Karen Extra Strength, Karen Genuine Aspirin, Bufferin, Bufferin Low Dose, Durlaza, Ecotrin, Ecpirin, Enteric Aspirin, Genuine Aspirin There may be other brand names for this medicine. When This Medicine Should Not Be Used: This medicine is not right for everyone. Do not use it if you had an allergic reaction to aspirin or other NSAIDs, or if you have a history of asthma with nasal polyps and rhinitis.  
How to Use This Medicine:  
Delayed Release Capsule, Long Acting Capsule, Gum, Tablet, Chewable Tablet, Fizzy Tablet, Coated Tablet, Long Acting Tablet, 24 Hour Capsule · Your doctor will tell you how much medicine to use. Do not use more than directed. · It is best to take this medicine with food or milk. · Capsule, tablet, or coated tablet: Swallow whole. Do not crush, break, or chew it. · Chewable tablet: You may chew it completely or swallow it whole. · Gum: Chew completely to make sure you get as much medicine as possible. Drink a full glass (8 ounces) of water after chewing the gum. · Swallow the extended-release capsule whole. Do not crush, break, or chew it. Take the capsule with a full glass of water at the same time each day. · Follow the instructions on the medicine label if you are using this medicine without a prescription. · Missed dose: If you miss a dose of Durlaza, skip the missed dose and go back to your regular dosing schedule. Do not take extra medicine to make up for a missed dose. · Store the medicine in a closed container at room temperature, away from heat, moisture, and direct light. Drugs and Foods to Avoid: Ask your doctor or pharmacist before using any other medicine, including over-the-counter medicines, vitamins, and herbal products. · Some foods and medicines can affect how aspirin works. Tell your doctor if you are using any of the following: ¨ Dipyridamole, methotrexate, probenecid, sulfinpyrazone, ticlopidine ¨ Blood thinner (including clopidogrel, prasugrel, ticagrelor, warfarin) ¨ Blood pressure medicine ¨ Medicine to treat seizures (including phenytoin, valproic acid) ¨ NSAID pain or arthritis medicine (including celecoxib, diclofenac, ibuprofen, naproxen) ¨ Steroid medicine (including dexamethasone, hydrocortisone, methylprednisolone, prednisolone, prednisone) · Do not take Durlaza 2 hours before or 1 hour after you drink alcohol or take medicines that contain alcohol. Warnings While Using This Medicine: · Tell your doctor if you are pregnant or breastfeeding. Do not use this medicine during the later part of a pregnancy unless your doctor tells you to. · Tell your doctor if you have kidney disease, liver disease, high blood pressure, heart disease, or a history of stomach bleeding or ulcers. · This medicine may increase your risk for bleeding, including stomach ulcers. · Do not give aspirin to a child or teenager who has chickenpox or flu symptoms, unless the doctor says it is okay. Aspirin can cause a life-threatening reaction called Reye syndrome. · Tell any doctor or dentist who treats you that you are using this medicine. This medicine may affect certain medical test results. · Keep all medicine out of the reach of children. Never share your medicine with anyone. Possible Side Effects While Using This Medicine:  
Call your doctor right away if you notice any of these side effects: · Allergic reaction: Itching or hives, swelling in your face or hands, swelling or tingling in your mouth or throat, chest tightness, trouble breathing · Bloody or black stools, bloody vomit or vomit that looks like coffee grounds · Chest tightness, wheezing · Ringing in the ears · Severe stomach pain · Unusual bleeding, bruising, or weakness If you notice other side effects that you think are caused by this medicine, tell your doctor. Call your doctor for medical advice about side effects. You may report side effects to FDA at 3-033-FDA-7218 © 2017 [facility] Information is for End User's use only and may not be sold, redistributed or otherwise used for commercial purposes. The above information is an  only. It is not intended as medical advice for individual conditions or treatments. Talk to your doctor, nurse or pharmacist before following any medical regimen to see if it is safe and effective for you. Pravastatin (By mouth) Pravastatin (prav-a-STAT-in) Treats high cholesterol and triglyceride levels. May reduce the risk of heart attack, stroke, and related health conditions. This medicine is a statin. Brand Name(s): Pravachol There may be other brand names for this medicine. When This Medicine Should Not Be Used: This medicine is not right for everyone. Do not use it if you have had an allergic reaction to pravastatin, or you are pregnant or breastfeeding. How to Use This Medicine:  
Tablet · Take your medicine as directed. Your dose may need to be changed several times to find what works best for you. · Take this medicine in the evening or at bedtime, unless your doctor tells you differently. · Missed dose: Take a dose as soon as you remember. If it is almost time for your next dose, wait until then and take a regular dose. Do not take extra medicine to make up for a missed dose. · Store the medicine in a closed container at room temperature, away from heat, moisture, and direct light. Drugs and Foods to Avoid: Ask your doctor or pharmacist before using any other medicine, including over-the-counter medicines, vitamins, and herbal products. · Some foods and medicines can affect how pravastatin works. Tell your doctor if you are using azithromycin, boceprevir, cimetidine, clarithromycin, colchicine, cyclosporine, erythromycin, itraconazole, ketoconazole, niacin (vitamin B3), or spironolactone. · Tell your doctor if you are taking other medicine to lower your cholesterol level, including cholestyramine, colestipol, gemfibrozil, or fenofibrate. You may need to take the medicine 1 hour before or 4 hours after you take pravastatin. Warnings While Using This Medicine: · It is not safe to take this medicine during pregnancy. It could harm an unborn baby. Tell your doctor right away if you become pregnant. · Tell your doctor if you have kidney disease, liver disease, diabetes, epilepsy, muscle pain or weakness, or thyroid problems.  Tell your doctor if you usually have more than 2 drinks of alcohol per day. · Tell any doctor or dentist who treats you that you are using this medicine. You may need to stop using this medicine several days before you have surgery or medical tests. · Your doctor will do lab tests at regular visits to check on the effects of this medicine. Keep all appointments. · Keep all medicine out of the reach of children. Never share your medicine with anyone. Possible Side Effects While Using This Medicine:  
Call your doctor right away if you notice any of these side effects: · Allergic reaction: Itching or hives, swelling in your face or hands, swelling or tingling in your mouth or throat, chest tightness, trouble breathing · Blistering, peeling, red skin rash · Dark urine or pale stools, diarrhea, nausea, vomiting, loss of appetite, pain in your upper stomach, yellow skin or eyes · Muscle pain, tenderness, or weakness · Unusual tiredness If you notice these less serious side effects, talk with your doctor:  
· Headache If you notice other side effects that you think are caused by this medicine, tell your doctor. Call your doctor for medical advice about side effects. You may report side effects to FDA at 2-844-FDA-9857 © 2017 [address] Information is for End User's use only and may not be sold, redistributed or otherwise used for commercial purposes. The above information is an  only. It is not intended as medical advice for individual conditions or treatments. Talk to your doctor, nurse or pharmacist before following any medical regimen to see if it is safe and effective for you. Tiotropium (By breathing) Tiotropium ([id number]) Treats asthma and chronic obstructive pulmonary disease (COPD). Brand Name(s): Spiriva, Spiriva Respimat There may be other brand names for this medicine. When This Medicine Should Not Be Used: This medicine is not right for everyone. Do not use it if you had an allergic reaction to tiotropium, ipratropium, or atropine. How to Use This Medicine:  
Capsule, Spray · Your doctor will tell you how much medicine to use. Do not use more than directed. Use this medicine at the same time each day. · Read and follow the patient instructions that come with this medicine. Talk to your doctor or pharmacist if you have any questions. · Spiriva® HandiHaler®:  
¨ Spiriva® capsules should be used only with the HandiHaler® device. Do not swallow the capsule. Do not use the device with any other medicine. ¨ Do not remove the capsule from the blister pack until you are ready to use it. Use the capsule right away once you have opened a blister pack. ¨ Do not let the powder from the capsule get into your eyes. ¨ After you have used a dose, remove the used capsule and throw it away. · Spiriva® Respimat® inhaler: ¨ Do not use the inhaler with any other medicine. ¨ Do not turn the clear base before you insert the cartridge. ¨ Do not remove the cartridge once it has been inserted in the inhaler. ¨ When you use the inhaler for the first time, you will need to prime it to make sure it delivers the correct amount of medicine. To prime the inhaler, point it away from your face and press the dose release button until you see a spray of medicine. Then press the button 3 more times. The inhaler is now ready to use. ¨ If you have not used your inhaler for more than 3 days, spray 1 dose of medicine away from your face to prime the inhaler. If you have not used your inhaler for more than 21 days, repeat the instructions for priming the inhaler for the first time. · Missed dose: Take a dose as soon as you remember. If it is almost time for your next dose, wait until then and take a regular dose. Do not take extra medicine to make up for a missed dose. Do not use this medicine more than once every 24 hours. · Store the medicine in a closed container at room temperature, away from heat, moisture, and direct light. ¨ Spiriva® HandiHaler®: Leave the capsules in the blister pack until you are ready to use the medicine. ¨ Spiriva® Respimat® inhaler: Throw away the inhaler 3 months after its first use. Drugs and Foods to Avoid: Ask your doctor or pharmacist before using any other medicine, including over-the-counter medicines, vitamins, and herbal products. · Some medicines can affect how tiotropium works. Tell your doctor if you are using ipratropium or other inhaled medicines. Warnings While Using This Medicine: · Tell your doctor if you are pregnant or breastfeeding, or if you have kidney disease, glaucoma, prostate problems, or problems urinating. Tell your doctor if you are allergic to milk proteins. · This medicine may cause the following problems: 
¨ Paradoxical bronchospasm (increased trouble breathing), which can be life-threatening ¨ New or worsening narrow-angle glaucoma ¨ New or worsening trouble urinating · This medicine may cause dizziness or blurred vision. Do not drive or do anything else that could be dangerous until you know how this medicine affects you. · Tell your doctor right away if you use other medicines for your lung condition and they do not seem to be working as well as usual. Do not change your doses or stop using your medicines before you talk to your doctor. · Your doctor will check your progress and the effects of this medicine at regular visits. Keep all appointments. · Keep all medicine out of the reach of children. Never share your medicine with anyone. Possible Side Effects While Using This Medicine:  
Call your doctor right away if you notice any of these side effects: · Allergic reaction: Itching or hives, swelling in your face or hands, swelling or tingling in your mouth or throat, chest tightness, trouble breathing · Change in how much or how often you urinate, painful or difficult urination · Eye pain or redness, vision problems, seeing halos around lights · Increased trouble breathing If you notice these less serious side effects, talk with your doctor: · Dry mouth · Cough, fever, runny nose, or sore throat If you notice other side effects that you think are caused by this medicine, tell your doctor. Call your doctor for medical advice about side effects. You may report side effects to FDA at 3-371-SUD-3676 © 2017 [address] Information is for End User's use only and may not be sold, redistributed or otherwise used for commercial purposes. The above information is an  only. It is not intended as medical advice for individual conditions or treatments. Talk to your doctor, nurse or pharmacist before following any medical regimen to see if it is safe and effective for you.

## 2017-09-29 NOTE — PROCEDURES
[facility]  *** [facility]AL [facility] ***    Name: Varinder Rey  MRN: [id number]    Inpatient  : 1950  HIS Order #: [id number]  [id number] [facility] Visit #: [id number]  Date: 29 Sep 2017    TYPE OF TEST: Peripheral Venous Testing    REASON FOR TEST  Pain in limb, Limb swelling    Right Leg:-  Deep venous thrombosis:           Yes  Proximal extent of thrombus:      Proximal Femoral  Superficial venous thrombosis:    No  Deep venous insufficiency:        Not examined  Superficial venous insufficiency: Not examined    Left Leg:-  Deep venous thrombosis:           Yes  Proximal extent of thrombus:      Popliteal At The Knee  Superficial venous thrombosis:    No  Deep venous insufficiency:        Not examined  Superficial venous insufficiency: Not examined      INTERPRETATION/FINDINGS  Duplex images were obtained using 2-D gray scale, color flow, and  spectral Doppler analysis. Right leg :  1. Minimal, chronic nonocclusive deep venous thrombosis identified in  the proximal femoral and popliteal veins. 2. Deep vein(s) visualized include the common femoral, femoral,  popliteal, posterior tibial and peroneal veins. 3. Unable to perform adequate compression analysis of the posterior  tibial and peroneal veins secondary to body habitus. Patency of  vessels demonstrated with color flow and doppler signals. However can  not rule out non-occlusive deep venous thrombosis at noted vein  segment. 4. No evidence of superficial thrombosis detected. 5. Superficial vein(s) visualized include the great saphenous vein at  the sapheno-femoral junction. 6. Pulsatile flow detected. 7. Triphasic posterior tibial artery doppler signal.  Left leg :  1. Minimal, chronic nonocclusive deep venous thrombosis identified in  the popliteal vein. 2. Deep vein(s) visualized include the common femoral, femoral,  popliteal, posterior tibial and peroneal veins.   3. Unable to perform adequate compression analysis of the posterior  tibial and peroneal veins secondary to body habitus. Patency of  vessels demonstrated with color flow and doppler signals. However can  not rule out non-occlusive deep venous thrombosis at noted vein  segment. 4. No evidence of superficial thrombosis detected. 5. Superficial vein(s) visualized include the great saphenous vein at  the sapheno-femoral junction. 6. Pulsatile flow detected. 7. Triphasic posterior tibial artery doppler signal.    ADDITIONAL COMMENTS  Technically difficult exam due to body habitus. Results to [address] @ 1:10 p.m. I have personally reviewed the data relevant to the interpretation of  this  study. TECHNOLOGIST: Armando Molina RVT  Signed: 09/29/2017 01:13 PM    PHYSICIAN: Julian Ibanez D.O.   Signed: 09/29/2017 01:43 PM

## 2017-09-29 NOTE — H&P
History & Physical    Patient: Monique Byrne MRN: [id number]  CSN: [id number]    YOB: 1950  Age: 78 y.o. Sex: male      DOA: 9/29/2017    Chief Complaint: No chief complaint on file. HPI:     Monique Byrne is a 78 y.o.  male who present with 1 month history of continued weight gain despite trippling  Dieuretic; also with periodic chest pain or pressure associated with nausea  Sent to cardiology had catherization which demonstrates severe AS and multivessel CAD   Needs OHS   Blood sugar well controlled last a1c 5.9  Has chronic leg swelling depsite diuretics denies calf swelling or worsening shortness of breath     Past Medical History:   Diagnosis Date    Atrial fibrillation (HCC)     Diabetes (HCC)     Fluid overload        Past Surgical History:   Procedure Laterality Date    HX CHOLECYSTECTOMY      HX KNEE REPLACEMENT      HX SHOULDER REPLACEMENT         Family History   Problem Relation Age of Onset    Family history unknown: Yes       Social History     Social History    Marital status:      Spouse name: N/A    Number of children: N/A    Years of education: N/A     Social History Main Topics    Smoking status: Former Smoker    Smokeless tobacco: Never Used    Alcohol use Yes    Drug use: None    Sexual activity: Yes     Other Topics Concern    None     Social History Narrative    None       Prior to Admission medications    Medication Sig Start Date End Date Taking? Authorizing Provider   cetirizine (ZYRTEC) 10 mg tablet Take  by mouth. Historical Provider   carvedilol (COREG) 3.125 mg tablet Take 3.125 mg by mouth daily. Historical Provider   doxycycline (PERIOSTAT) 20 mg tablet Take 20 mg by mouth two (2) times a day. Historical Provider   furosemide (LASIX) 20 mg tablet Take  by mouth two (2) times daily as needed. Historical Provider   gabapentin (NEURONTIN) 600 mg tablet Take 600 mg by mouth three (3) times daily.     Historical Provider   lisinopril (PRINIVIL, ZESTRIL) 10 mg tablet Take 10 mg by mouth daily. Historical Provider   traMADol (ULTRAM) 50 mg tablet Take 50 mg by mouth every six (6) hours as needed for Pain. Historical Provider   traZODone (DESYREL) 50 mg tablet Take 50 mg by mouth nightly. Historical Provider   warfarin (COUMADIN) 5 mg tablet Take 5 mg by mouth daily. Historical Provider   rivaroxaban (XARELTO) 20 mg tab tablet Take 20 mg by mouth daily (with dinner). Historical Provider       No Known Allergies      Review of Systems  GENERAL: Patient alert, awake and oriented times 3, able to communicate full sentences and not in distress. HEENT: No change in vision, no earache, tinnitus, sore throat or sinus congestion. NECK: No pain or stiffness. PULMONARY: No shortness of breath, cough or wheeze. Cardiovascular: has pnd or orthopnea, has CP  GASTROINTESTINAL: No abdominal pain, nausea, vomiting or diarrhea, melena or bright red blood per rectum. GENITOURINARY: No urinary frequency, urgency, hesitancy or dysuria. MUSCULOSKELETAL: No joint or muscle pain, no back pain, no recent trauma. DERMATOLOGIC: No rash, no itching, no lesions. ENDOCRINE: No polyuria, polydipsia, no heat or cold intolerance. No recent change in weight. HEMATOLOGICAL: No anemia or easy bruising or bleeding. NEUROLOGIC: No headache, seizures, numbness, tingling or weakness. Physical Exam:     Physical Exam:  Visit Vitals    BP (!) 156/96    Pulse 97    Temp 98.3 °F (36.8 °C)    Resp 23    SpO2 93%           Temp (24hrs), Av.2 °F (36.8 °C), Min:98.1 °F (36.7 °C), Max:98.3 °F (36.8 °C)     1901 -  0700  In: 473 [P.O.:473]  Out: 851 [Urine:850]        General:  Alert, cooperative, no distress, appears stated age. Head: Normocephalic, without obvious abnormality, atraumatic. Eyes:  Conjunctivae/corneas clear. PERRL, EOMs intact. Nose: Nares normal. No drainage or sinus tenderness. Neck: Supple, symmetrical, trachea midline, no adenopathy, thyroid: no enlargement, no carotid bruit and no JVD. Lungs:   Clear to auscultation bilaterally. Heart:  Irregularly irregular 2/6 zita      Abdomen: Soft, non-tender. Bowel sounds normal.    Extremities: Extremities normal, atraumatic, no cyanosis plus 3 pitting edema. Pulses: 2+ and symmetric all extremities. Skin:  No rashes or lesions   Neurologic: AAOx3, No focal motor or sensory deficit. Recent Results (from the past 24 hour(s))   PROTHROMBIN TIME + INR    Collection Time: 09/28/17  7:40 AM   Result Value Ref Range    Prothrombin time 19.2 (H) 11.5 - 15.2 sec    INR 1.7 (H) 0.8 - 1.2     METABOLIC PANEL, BASIC    Collection Time: 09/28/17  7:40 AM   Result Value Ref Range    Sodium 141 136 - 145 mmol/L    Potassium 4.2 3.5 - 5.5 mmol/L    Chloride 103 100 - 108 mmol/L    CO2 32 21 - 32 mmol/L    Anion gap 6 3.0 - 18 mmol/L    Glucose 127 (H) 74 - 99 mg/dL    BUN 19 (H) 7.0 - 18 MG/DL    Creatinine 0.98 0.6 - 1.3 MG/DL    BUN/Creatinine ratio 19 12 - 20      GFR est AA >60 >60 ml/min/1.73m2    GFR est non-AA >60 >60 ml/min/1.73m2    Calcium 9.1 8.5 - 10.1 MG/DL   CBC WITH AUTOMATED DIFF    Collection Time: 09/28/17  7:40 AM   Result Value Ref Range    WBC 5.7 4.6 - 13.2 K/uL    RBC 4.55 (L) 4.70 - 5.50 M/uL    HGB 14.8 13.0 - 16.0 g/dL    HCT 43.8 36.0 - 48.0 %    MCV 96.3 74.0 - 97.0 FL    MCH 32.5 24.0 - 34.0 PG    MCHC 33.8 31.0 - 37.0 g/dL    RDW 14.7 (H) 11.6 - 14.5 %    PLATELET 521 (L) 232 - 420 K/uL    MPV 11.1 9.2 - 11.8 FL    NEUTROPHILS 61 40 - 73 %    LYMPHOCYTES 26 21 - 52 %    MONOCYTES 10 3 - 10 %    EOSINOPHILS 2 0 - 5 %    BASOPHILS 1 0 - 2 %    ABS. NEUTROPHILS 3.5 1.8 - 8.0 K/UL    ABS. LYMPHOCYTES 1.5 0.9 - 3.6 K/UL    ABS. MONOCYTES 0.6 0.05 - 1.2 K/UL    ABS. EOSINOPHILS 0.1 0.0 - 0.4 K/UL    ABS.  BASOPHILS 0.0 0.0 - 0.06 K/UL    DF AUTOMATED     POC ACTIVATED CLOTTING TIME    Collection Time: 09/28/17 11:04 AM Result Value Ref Range    Activated Clotting Time (POC) 212 (H) 79 - 138 SECS   GLUCOSE, POC    Collection Time: 09/28/17  9:08 PM   Result Value Ref Range    Glucose (POC) 116 (H) 70 - 110 mg/dL     Labs Reviewed: All lab results for the last 24 hours reviewed. and EKG    Procedures/imaging: see electronic medical records for all procedures/Xrays and details which were not copied into this note but were reviewed prior to creation of Plan      Assessment/Plan     Active Problems: Aortic Stenosis    CAD multivessel  DM  Fluid overload  Hx of Cardiac arrest     Plan:   on meds pending vasular eval for OHS  Sliding Scale isulin   Venous ultrasound of leg        DVT/GI Prophylaxis: Lovenox and Hep SQ    Discussed with patient at bedside about hospital admission and my plan care, who understood and agree with my plan care.     Esdras Klein MD  9/29/2017 5:43 AM

## 2017-09-29 NOTE — ROUTINE PROCESS
0145 TRANSFER - IN REPORT:    Verbal report received from Floresita Roper (name) on Jasvir Schwartz  being received from [facility](unit) for ordered procedure      Report consisted of patients Situation, Background, Assessment and   Recommendations(SBAR). Information from the following report(s) SBAR was reviewed with the receiving nurse. Opportunity for questions and clarification was provided. Assessment completed upon patients arrival to unit and care assumed. 0200 Patient is a large obese man who is short of breathe with very little exertion. No chest pain reported. Put patient on 02 2L upon arrival. His breathe sounds are clear but diminished. Patient states he gained 30 to 30 pounds in two weeks he states it was in his abdomen. Skin in intact but legs are scaly and swollen. No other signs or symptoms of distress at this time. Applied tele and patient is running afib. 0230 Went over medication part of admission  With patient he stated he takes whatever the wife throws in his hand he does not know what he takes he just knows that he takes whatever his wife gives him  0400 Patient blood pressure is high called doctor and got PRN orders for blood pressure. [id number] Patient is alert and oriented times three with no signs or symptoms of distress.    0700   Mobility Intervention:       [] Pt dangled at edge of bed    [] Pt assisted OOB to bedside commode    [] Pt assisted OOB to chair    [] Pt ambulated to bathroom    [x] Patient was ambulated in room/hallway    Assistive Device Utilized (check all that apply):       [x] Rolling walker   [] Crutches   [] Straight Cane   [] Knee immobilizer   [] IV pole    After Mobilization:     [] Patient left in no apparent distress sitting up in chair  [x] Patient left in no apparent distress in bed  [x] Call bell left within reach  Assistive Device:        [] RN notified  [] Caregiver present  [] Bed alarm activated    Comments:     0715   Bedside and Verbal shift change report given to 2 [facility] nurse (oncoming nurse) by Moncho Ching RN (offgoing nurse). Report included the following information SBAR, Kardex, MAR and Recent Results. SITUATION:    Code Status: No Order   Reason for Admission: [facility] day: 0   Problem List:         BACKGROUND:    Past Medical History: No past medical history on file. Patient taking anticoagulants yes     ASSESSMENT:    Changes in Assessment Throughout Shift: none     Patient has Central Line: no Reasons if yes: none   Patient has Allison Cath: no Reasons if yes: none      Last Vitals:     Vitals:    09/29/17 0149 09/29/17 0342   BP: (!) 175/110 (!) 181/101   Pulse: 100 97   Resp: 22 23   Temp: 98.1 °F (36.7 °C) 98.3 °F (36.8 °C)   SpO2: 95% 93%        IV and DRAINS (will only show if present)         WOUND (if present)   Wound Type:  none   Dressing present     Wound Concerns/Notes:  none     PAIN    Pain Assessment    Pain Intensity 1: 0 (09/29/17 0215)              Patient Stated Pain Goal: 0  o Interventions for Pain:  none  o Intervention effective: no  o Time of last intervention: 0400   o Reassessment Completed: no      Last 3 Weights: There were no vitals filed for this visit. Weight change:      INTAKE/OUPUT    Current Shift: 09/28 1901 - 09/29 0700  In: 473 [P.O.:473]  Out: 451 [Urine:450]    Last three shifts:       LAB RESULTS     Recent Labs      09/28/17   0740   WBC  5.7   HGB  14.8   HCT  43.8   PLT  113*        Recent Labs      09/28/17   0740   NA  141   K  4.2   GLU  127*   BUN  19*   CREA  0.98   CA  9.1   INR  1.7*       RECOMMENDATIONS AND DISCHARGE PLANNING     1. Pending tests/procedures/ Plan of Care or Other Needs: cabg     2. Discharge plan for patient and Needs/Barriers: surgery    3. Estimated Discharge Date: 10/15/17 Posted on Whiteboard in Patients Room: no      4. The patient's care plan was reviewed with the oncoming nurse.        \"HEALS\" SAFETY CHECK      Fall Risk    Total Score: 2    Safety Measures: Safety Measures: Bed/Chair-Wheels locked, Bed in low position, Caregiver at bedside, Call light within reach    A safety check occurred in the patient's room between off going nurse and oncoming nurse listed above. The safety check included the below items  Area Items   H  High Alert Medications - Verify all high alert medication drips (heparin, PCA, etc.)   E  Equipment - Suction is set up for ALL patients (with cinthya)  - Red plugs utilized for all equipment (IV pumps, etc.)  - WOWs wiped down at end of shift.  - Room stocked with oxygen, suction, and other unit-specific supplies   A  Alarms - Bed alarm is set for fall risk patients  - Ensure chair alarm is in place and activated if patient is up in a chair   L  Lines - Check IV for any infiltration  - Allison bag is empty if patient has a Allison   - Tubing and IV bags are labeled   S  Safety   - Room is clean, patient is clean, and equipment is clean. - Hallways are clear from equipment besides carts. - Fall bracelet on for fall risk patients  - Ensure room is clear and free of clutter  - Suction is set up for ALL patients (with cinthya)  - Hallways are clear from equipment besides carts.    - Isolation precautions followed, supplies available outside room, sign posted     Moncho Ching RN

## 2017-09-29 NOTE — ROUTINE PROCESS
9631 Patient had 13 beats of V-Tach heart rate of 135-168, patient denies, Chest pain,SOB and Dizziness, MD paged, will continue to monitor, 5214 MD aware, no new orders, stated \"continue to monitor patient\"

## 2017-09-30 NOTE — PROGRESS NOTES
Bedside shift change report given to Lizette Mcguire RN (oncoming nurse) by Donaldo Davis (offgoing nurse). Report given with SBAR, Kardex, Intake/Output, MAR and Recent Results. Tele monitor shows Afib.

## 2017-09-30 NOTE — PROGRESS NOTES
[facility]  -  Progress Note      Patient: Johnnie Billingsley MRN: [id number]  SSN: [id number]    YOB: 1950  Age: 78 y.o. Sex: male      Admit Date: 9/29/2017    Assessment:     Hospital Problems  Never Reviewed          Codes Class Noted POA    * (Principal)Aortic stenosis ICD-10-CM: I35.0  ICD-9-CM: 424.1  9/29/2017 Yes        Coronal hypospadias ICD-10-CM: Q54.0  ICD-9-CM: 752.61  9/29/2017 Yes        Chronic atrial fibrillation (HCC) (Chronic) ICD-10-CM: I48.2  ICD-9-CM: 427.31  9/29/2017 Yes        Chest pain ICD-10-CM: R07.9  ICD-9-CM: 786.50  9/29/2017 Yes        Fluid overload ICD-10-CM: E87.70  ICD-9-CM: 276.69  9/29/2017 Yes        Tricuspid regurgitation ICD-10-CM: I07.1  ICD-9-CM: 397.0  9/29/2017 Yes        Chronic anticoagulation (Chronic) ICD-10-CM: Z79.01  ICD-9-CM: V58.61  9/29/2017 Yes        Diabetic retinopathy associated with type 2 diabetes mellitus ([facility] ICD-10-CM: E11.319  ICD-9-CM: 250.50, 362.01  9/29/2017 Yes        Coronary artery disease involving native coronary artery of native heart without angina pectoris (Chronic) ICD-10-CM: I25.10  ICD-9-CM: 414.01  9/28/2017 Yes        Diabetes mellitus with no complication (HCC) (Chronic) ICD-10-CM: E11.9  ICD-9-CM: 250.00  6/12/2017 Yes    Overview Signed 9/29/2017  5:46 AM by Boogie Leal MD     Last Assessment & Plan:   No apparent diabetic retinopathy. . Pt told to keep regular primary care and eye appointments to reduce the risk of visual loss from diabetic eye complications, and to follow up immediately for changes in vision.                 -Coronary artery disease, cardiac cath 9/28/17: RECOMMENDATIONS: Multivessel bypass surgery, aortic valve replacement and possible maze procedure or appendage removal for chronic atrial fibrillation. Findings:      1. 30% distal left main disease. 2. Severe 100% occluded proximal LAD disease. 3. Severe ostial disease of more than 80%.       4. Moderate to severe disease in PDA and PLV branches of the RCA. -Chronic atrial fibrillation  -Chronic OAC therapy due to Hx DVT  -Bilateral chronic non-occlusive DVT's by LE doppler 9/29/17   -Aortic stenosis with mean gradient of 37 mmHg with mildly low EF by cath 9/28/17  -Moderate pulmonary hypertension with pulmonary vascular resistance of 4.425 Wood units consistent with the combination etiology of left ventricular dysfunction with left ventricular end-diastolic pressure of 31 mmHg, and sleep apnea and lung disease (Cath 9/28/17). -HTN  -Documented Hx DM, although pt denies and states A1c was 4.9    Plan:     1. Complete pre-op evaluation as ordered by Dr. Gorge Lockwood  2. We will review echocardiogram to be done Monday AM and make further recommendations at that time. Subjective:     No new complaints. Sitting in bed and comfortable without complaint. Duplex of lower extremities with right leg showing minimal, chronic nonocclusive deep venous thrombosis identified in the proximal femoral and popliteal veins. Left leg with minimal, chronic nonocclusive deep venous thrombosis identified in the popliteal vein. CT of chest with contrast pending today and echocardiogram on Monday. Objective:      Patient Vitals for the past 8 hrs:   Temp Pulse Resp BP SpO2   09/30/17 1617 99.3 °F (37.4 °C) 97 18 (!) 146/103 95 %   09/30/17 1200 98.4 °F (36.9 °C) 86 18 (!) 142/93 92 %         Patient Vitals for the past 96 hrs:   Weight   09/30/17 0628 147.2 kg (324 lb 9.6 oz)         Intake/Output Summary (Last 24 hours) at 09/30/17 1647  Last data filed at 09/30/17 0350   Gross per 24 hour   Intake              240 ml   Output              550 ml   Net             -310 ml       Physical Exam:  General:  alert, cooperative, no distress, appears stated age  Neck:  no JVD  Lungs:  clear to auscultation bilaterally  Heart:  regular rate and rhythm, S1 is normal with S2 markedly reduced.  There is a grade III/VI late peaking PRERNA along the LSB and in the aortic area without diastolic murmur, click, rub or gallop  Abdomen:  abdomen is soft without significant tenderness, masses, organomegaly or guarding  Extremities:  extremities normal, atraumatic, no cyanosis with trace edema and compression stocks on both legs to knees.    Data Review:     Labs: Results:       Chemistry Recent Labs      09/30/17   1015  09/29/17   1645  09/28/17   0740   GLU  112*  105*  127*   NA  140  142  141   K  4.7  3.6  4.2   CL  103  103  103   CO2  30  31  32   BUN  22*  20*  19*   CREA  1.01  1.40*  0.98   CA  8.9  9.0  9.1   AGAP  7  8  6   BUCR  22*  14  19   AP   --   96   --    TP   --   6.4   --    ALB   --   3.5   --    GLOB   --   2.9   --    AGRAT   --   1.2   --       CBC w/Diff Recent Labs      09/30/17   0014  09/28/17   0740   WBC  7.2  5.7   RBC  4.10*  4.55*   HGB  13.1  14.8   HCT  40.6  43.8   PLT  100*  113*   GRANS  63  61   LYMPH  23  26   EOS  1  2      Cardiac Enzymes No results found for: CPK, CK, CKMMB, CKMB, RCK3, CKMBT, CKNDX, CKND1, ANA, TROPT, TROIQ, YUDITH, TROPT, TNIPOC, BNP, BNPP   Coagulation Recent Labs      09/28/17   0740   PTP  19.2*   INR  1.7*       Lipid Panel No results found for: CHOL, CHOLPOCT, CHOLX, CHLST, CHOLV, 922094, HDL, LDL, LDLC, DLDLP, 468304, VLDLC, VLDL, TGLX, TRIGL, TRIGP, TGLPOCT, CHHD, CHHDX   BNP No results found for: BNP, BNPP, XBNPT   Liver Enzymes Recent Labs      09/29/17   1645   TP  6.4   ALB  3.5   AP  96   SGOT  38*      Digoxin    Thyroid Studies No results found for: T4, T3U, TSH, TSHEXT       Enrico Crane,   September 30, 2017

## 2017-09-30 NOTE — ROUTINE PROCESS
Pt c/o runny nose and requested for medicine that he takes at home-zyrtec.  Dr Amber Carrizales made aware with order rec'd- Claritin substitute for Zyrtec per Pharmacist.

## 2017-09-30 NOTE — PROGRESS NOTES
[facility] Hospitalist Group  Progress Note    Patient: Osvaldo Rodriguez Age: 78 y.o. : 1950 MR#: [id number] SSN: [id number]  Date: 2017     Subjective:     Seen with nurse @ bedside. In good spirits. Denies F/C, N/V, CP, SOB. Assessment/Plan:   1. Severe AStenosis, severe 3v CADz - no CP or SOB presently. CTSurgery eval'ing: CABG vs TAVR c PCI. Will f/u on testing and recs. On tx dose Lovenox, ASA, BBlocker, statin and Lasix. CTSurgery note reviewed. Pre-op w/u in process. 2. DM - SSI. 3. HTN - BP a little elevated, following for now. 4. Acute on chronic systolic CHF exac - compensated, med tx as above. 5. Hyperlipidemia - statin. 6. Chronic AFib - rate controlled. ASA, tx dose Lovenox. 7. Chronic, nonocclusive BLE DVT - as above, on tx dose Lovenox. 8. Thrombocytopenia - unknown chronicity. No prior labs in [facility] or [facility]. Following. Additional Notes:     CT chest:  IMPRESSION:  Fusiform dilatation of the ascending aorta with maximum diameter of 5.3 cm. Scattered minimal plaque in the thoracic aorta. Pulmonary arterial enlargement with the main pulmonary artery measuring 5.5 cm. Small pericardial effusion. A bilateral solitary calcified granuloma vs. a calcified pleural plaque.   Nonobstructing left intrarenal calculus    Case discussed with:  [x]Patient  []Family  [x]Nursing  []Case Management  DVT Prophylaxis:  [x]Lovenox  []Hep SQ  []SCDs  []Coumadin   []On Heparin gtt    Objective:   VS:   Visit Vitals    BP (!) 146/103 (BP 1 Location: Right arm, BP Patient Position: At rest)    Pulse 97    Temp 99.3 °F (37.4 °C)    Resp 18    Ht 5' 11\" (1.803 m)    Wt 147.2 kg (324 lb 9.6 oz)    SpO2 95%    BMI 45.27 kg/m2      Tmax/24hrs: Temp (24hrs), Av.3 °F (36.8 °C), Min:97.2 °F (36.2 °C), Max:99.3 °F (37.4 °C)      Intake/Output Summary (Last 24 hours) at 17 1717  Last data filed at 17 0350   Gross per 24 hour   Intake 240 ml   Output              550 ml   Net             -310 ml       General:  Awake, alert, NAD. Cardiovascular:  iRRR c 3/6 PRERNA. Pulmonary:  CTA B.  GI:  Soft, obese, NT/ND, NABS. Extremities:  No CT, 2+ BLE chronic edema. Additional:      Labs:    Recent Results (from the past 24 hour(s))   GLUCOSE, POC    Collection Time: 09/29/17  9:41 PM   Result Value Ref Range    Glucose (POC) 123 (H) 70 - 110 mg/dL   HEMOGLOBIN A1C WITH EAG    Collection Time: 09/30/17 12:14 AM   Result Value Ref Range    Hemoglobin A1c 6.2 (H) 4.2 - 5.6 %    Est. average glucose 131 mg/dL   CBC WITH AUTOMATED DIFF    Collection Time: 09/30/17 12:14 AM   Result Value Ref Range    WBC 7.2 4.6 - 13.2 K/uL    RBC 4.10 (L) 4.70 - 5.50 M/uL    HGB 13.1 13.0 - 16.0 g/dL    HCT 40.6 36.0 - 48.0 %    MCV 99.0 (H) 74.0 - 97.0 FL    MCH 32.0 24.0 - 34.0 PG    MCHC 32.3 31.0 - 37.0 g/dL    RDW 15.1 (H) 11.6 - 14.5 %    PLATELET 716 (L) 127 - 420 K/uL    MPV 11.3 9.2 - 11.8 FL    NEUTROPHILS 63 40 - 73 %    LYMPHOCYTES 23 21 - 52 %    MONOCYTES 13 (H) 3 - 10 %    EOSINOPHILS 1 0 - 5 %    BASOPHILS 0 0 - 2 %    ABS. NEUTROPHILS 4.4 1.8 - 8.0 K/UL    ABS. LYMPHOCYTES 1.7 0.9 - 3.6 K/UL    ABS. MONOCYTES 1.0 0.05 - 1.2 K/UL    ABS. EOSINOPHILS 0.1 0.0 - 0.4 K/UL    ABS.  BASOPHILS 0.0 0.0 - 0.1 K/UL    DF AUTOMATED     GLUCOSE, POC    Collection Time: 09/30/17  6:21 AM   Result Value Ref Range    Glucose (POC) 121 (H) 70 - 110 mg/dL   GLUCOSE, POC    Collection Time: 09/30/17  8:25 AM   Result Value Ref Range    Glucose (POC) 137 (H) 70 - 101 mg/dL   METABOLIC PANEL, BASIC    Collection Time: 09/30/17 10:15 AM   Result Value Ref Range    Sodium 140 136 - 145 mmol/L    Potassium 4.7 3.5 - 5.5 mmol/L    Chloride 103 100 - 108 mmol/L    CO2 30 21 - 32 mmol/L    Anion gap 7 3.0 - 18 mmol/L    Glucose 112 (H) 74 - 99 mg/dL    BUN 22 (H) 7.0 - 18 MG/DL    Creatinine 1.01 0.6 - 1.3 MG/DL    BUN/Creatinine ratio 22 (H) 12 - 20      GFR est AA >60 >60 ml/min/1.73m2    GFR est non-AA >60 >60 ml/min/1.73m2    Calcium 8.9 8.5 - 10.1 MG/DL   GLUCOSE, POC    Collection Time: 09/30/17 12:10 PM   Result Value Ref Range    Glucose (POC) 171 (H) 70 - 110 mg/dL   GLUCOSE, POC    Collection Time: 09/30/17  4:20 PM   Result Value Ref Range    Glucose (POC) 99 70 - 110 mg/dL       Signed By: Carmela Ruth MD     September 30, 2017 2:35 PM

## 2017-09-30 NOTE — PROGRESS NOTES
Patient had 17 beats of Vtach, however; asymptomatic.  Dr Angel Diaz informed about same who requested close observation of patient

## 2017-09-30 NOTE — PROGRESS NOTES
[facility]    [address] cardiology consult, wants to go home. Denies SOB or CP, diuresing. preop workup done:  A1C 6.2, CT chest dilated aorta 4.9-5.3, U/A dirty, sCR 1.4, CXR ok,  RA paO2 61, LE PVL with chronic non-occlusive DVT->  Mapping/marking not done. preop assesment to be done: Echo, LUCIANO, carotids, PFT    Leana Hastings PA-C CV[facility]  09/30/17, 11:01 AM      CARDIOTHORACIC ATTENDING STAFF NOTE    Initial work up results noted. Enlargd ascending aorta by CT scan will need replacement or aortorrhaphy with surgery if this path is taken. Plt ct only 100K    A1c 6.2    SIGNIFICANT cardiomegaly on CXR. Awaiting echo to especially assess RV function and additional valvular disease.     Leoncio Arita MD

## 2017-09-30 NOTE — ROUTINE PROCESS
Bedside and Verbal shift change report given to Gauri García (oncoming nurse) by April Stephenson   (offgoing nurse). Report included the following information SBAR, Intake/Output, MAR and Cardiac Rhythm A Fib.

## 2017-10-01 NOTE — ROUTINE PROCESS
Bedside shift change report given to Daneil Ormond RN (oncoming nurse) by Equilla Aschoff (offgoing nurse). Report given with SBAR, Kardex, Intake/Output, MAR and Recent Results.

## 2017-10-01 NOTE — PROGRESS NOTES
[facility]    No complaints. Doubt I/O correct. preop risk assesment to be done: Echo, LUCIANO, carotids, PFT. Optimal plan in evolution once preop workup complete.      Brigitte Bonner PA-C [facility]  10/01/17, 11:37 AM

## 2017-10-01 NOTE — ROUTINE PROCESS
Bedside and Verbal shift change report given to [facility] (oncoming nurse) by Tonya Oreilly   (offgoing nurse). Report included the following information SBAR, Intake/Output, MAR and Cardiac Rhythm Afib.

## 2017-10-01 NOTE — PROGRESS NOTES
[facility] Hospitalist Group  Progress Note    Patient: Sebastián Sharma Age: 78 y.o. : 1950 MR#: [id number] SSN: [id number]  Date: 10/1/2017     Subjective:     Sitting in chair by window. No F/C, N/V, CP, SOB. No pain c/o. Assessment/Plan:   1. Severe AStenosis, severe 3v CADz - no CP or SOB presently. CTSurgery eval'ing: CABG vs TAVR c PCI. Will f/u on testing and recs. On tx dose Lovenox, ASA, BBlocker, statin and Lasix. Consultant notes reviewed. Pre-op w/u in process. 2. DM - SSI. 3. HTN - BPs with some elevations. Will defer to cardiology/CTSurgery for dose adjustments. 4. Acute on chronic systolic CHF exac - compensated, med tx as above. No acute. 5. Hyperlipidemia - statin. 6. Chronic AFib - rate controlled. ASA, tx dose Lovenox. 7. Chronic, nonocclusive BLE DVT - as above, on tx dose Lovenox. 8. Thrombocytopenia - unknown chronicity. No prior labs in [facility] or [facility]. Plt counts relatively stable, following. Additional Notes:     CT chest:  IMPRESSION:  Fusiform dilatation of the ascending aorta with maximum diameter of 5.3 cm. Scattered minimal plaque in the thoracic aorta. Pulmonary arterial enlargement with the main pulmonary artery measuring 5.5 cm. Small pericardial effusion. A bilateral solitary calcified granuloma vs. a calcified pleural plaque.   Nonobstructing left intrarenal calculus    Case discussed with:  [x]Patient  []Family  []Nursing  []Case Management  DVT Prophylaxis:  [x]Lovenox  []Hep SQ  []SCDs  []Coumadin   []On Heparin gtt    Objective:   VS:   Visit Vitals    BP (!) 147/99 (BP 1 Location: Right arm, BP Patient Position: At rest)    Pulse 88    Temp 97.4 °F (36.3 °C)    Resp 18    Ht 5' 11\" (1.803 m)    Wt 147.8 kg (325 lb 12.8 oz)    SpO2 95%    BMI 45.44 kg/m2      Tmax/24hrs: Temp (24hrs), Av.4 °F (36.9 °C), Min:97.4 °F (36.3 °C), Max:99.3 °F (37.4 °C)      Intake/Output Summary (Last 24 hours) at 10/01/17 0928  Last data filed at 10/01/17 0130   Gross per 24 hour   Intake             1410 ml   Output              400 ml   Net             1010 ml       General:  Awake, alert, NAD, in chair. Cardiovascular:  iRRR c 3/6 PRERNA. Pulmonary:  CTA B.  GI:  Soft, obese, NT/ND, NABS. Extremities:  No CT, 2+ BLE chronic edema. Additional:      Labs:    Recent Results (from the past 24 hour(s))   METABOLIC PANEL, BASIC    Collection Time: 09/30/17 10:15 AM   Result Value Ref Range    Sodium 140 136 - 145 mmol/L    Potassium 4.7 3.5 - 5.5 mmol/L    Chloride 103 100 - 108 mmol/L    CO2 30 21 - 32 mmol/L    Anion gap 7 3.0 - 18 mmol/L    Glucose 112 (H) 74 - 99 mg/dL    BUN 22 (H) 7.0 - 18 MG/DL    Creatinine 1.01 0.6 - 1.3 MG/DL    BUN/Creatinine ratio 22 (H) 12 - 20      GFR est AA >60 >60 ml/min/1.73m2    GFR est non-AA >60 >60 ml/min/1.73m2    Calcium 8.9 8.5 - 10.1 MG/DL   GLUCOSE, POC    Collection Time: 09/30/17 12:10 PM   Result Value Ref Range    Glucose (POC) 171 (H) 70 - 110 mg/dL   GLUCOSE, POC    Collection Time: 09/30/17  4:20 PM   Result Value Ref Range    Glucose (POC) 99 70 - 110 mg/dL   GLUCOSE, POC    Collection Time: 09/30/17  9:08 PM   Result Value Ref Range    Glucose (POC) 112 (H) 70 - 110 mg/dL   CBC WITH AUTOMATED DIFF    Collection Time: 10/01/17 12:24 AM   Result Value Ref Range    WBC 7.1 4.6 - 13.2 K/uL    RBC 4.14 (L) 4.70 - 5.50 M/uL    HGB 13.5 13.0 - 16.0 g/dL    HCT 40.9 36.0 - 48.0 %    MCV 98.8 (H) 74.0 - 97.0 FL    MCH 32.6 24.0 - 34.0 PG    MCHC 33.0 31.0 - 37.0 g/dL    RDW 14.7 (H) 11.6 - 14.5 %    PLATELET 96 (L) 418 - 420 K/uL    MPV 11.9 (H) 9.2 - 11.8 FL    NEUTROPHILS 61 40 - 73 %    LYMPHOCYTES 21 21 - 52 %    MONOCYTES 15 (H) 3 - 10 %    EOSINOPHILS 2 0 - 5 %    BASOPHILS 1 0 - 2 %    ABS. NEUTROPHILS 4.4 1.8 - 8.0 K/UL    ABS. LYMPHOCYTES 1.5 0.9 - 3.6 K/UL    ABS. MONOCYTES 1.0 0.05 - 1.2 K/UL    ABS. EOSINOPHILS 0.1 0.0 - 0.4 K/UL    ABS.  BASOPHILS 0.0 0.0 - 0.1 K/UL DF AUTOMATED     METABOLIC PANEL, BASIC    Collection Time: 10/01/17 12:24 AM   Result Value Ref Range    Sodium 140 136 - 145 mmol/L    Potassium 3.9 3.5 - 5.5 mmol/L    Chloride 103 100 - 108 mmol/L    CO2 29 21 - 32 mmol/L    Anion gap 8 3.0 - 18 mmol/L    Glucose 102 (H) 74 - 99 mg/dL    BUN 23 (H) 7.0 - 18 MG/DL    Creatinine 1.05 0.6 - 1.3 MG/DL    BUN/Creatinine ratio 22 (H) 12 - 20      GFR est AA >60 >60 ml/min/1.73m2    GFR est non-AA >60 >60 ml/min/1.73m2    Calcium 8.8 8.5 - 10.1 MG/DL   GLUCOSE, POC    Collection Time: 10/01/17  6:57 AM   Result Value Ref Range    Glucose (POC) 128 (H) 70 - 110 mg/dL       Signed By: Sol Chen MD     October 1, 2017 2:35 PM

## 2017-10-02 NOTE — PROGRESS NOTES
[facility]  -  Progress Note      Patient: Monique Byrne MRN: [id number]  SSN: [id number]    YOB: 1950  Age: 78 y.o. Sex: male      Admit Date: 9/29/2017    Assessment:     Hospital Problems  Never Reviewed          Codes Class Noted POA    * (Principal)Aortic stenosis ICD-10-CM: I35.0  ICD-9-CM: 424.1  9/29/2017 Yes        Coronal hypospadias ICD-10-CM: Q54.0  ICD-9-CM: 752.61  9/29/2017 Yes        Chronic atrial fibrillation (HCC) (Chronic) ICD-10-CM: I48.2  ICD-9-CM: 427.31  9/29/2017 Yes        Chest pain ICD-10-CM: R07.9  ICD-9-CM: 786.50  9/29/2017 Yes        Fluid overload ICD-10-CM: E87.70  ICD-9-CM: 276.69  9/29/2017 Yes        Tricuspid regurgitation ICD-10-CM: I07.1  ICD-9-CM: 397.0  9/29/2017 Yes        Chronic anticoagulation (Chronic) ICD-10-CM: Z79.01  ICD-9-CM: V58.61  9/29/2017 Yes        Diabetic retinopathy associated with type 2 diabetes mellitus ([facility] ICD-10-CM: E11.319  ICD-9-CM: 250.50, 362.01  9/29/2017 Yes        Coronary artery disease involving native coronary artery of native heart without angina pectoris (Chronic) ICD-10-CM: I25.10  ICD-9-CM: 414.01  9/28/2017 Yes        Diabetes mellitus with no complication (HCC) (Chronic) ICD-10-CM: E11.9  ICD-9-CM: 250.00  6/12/2017 Yes    Overview Signed 9/29/2017  5:46 AM by Robel Byrne MD     Last Assessment & Plan:   No apparent diabetic retinopathy. . Pt told to keep regular primary care and eye appointments to reduce the risk of visual loss from diabetic eye complications, and to follow up immediately for changes in vision.                   -Coronary artery disease, cardiac cath 9/28/17: RECOMMENDATIONS: Multivessel bypass surgery, aortic valve replacement and possible maze procedure or appendage removal for chronic atrial fibrillation. Findings:      1. 30% distal left main disease.      2. Severe 100% occluded proximal LAD disease.      3. Severe ostial disease of more than 80%.       4. Moderate to severe disease in PDA and PLV branches of the RCA. -Chronic atrial fibrillation  -Chronic OAC therapy due to Hx DVT  -Bilateral chronic non-occlusive DVT's by LE doppler 9/29/17   -Echo 10/2/17: dilated LV, EF 45-50%, mild diffuse hypokinesis with regional variations, markedly increased LV wall thickness, indeterminate diastolic function, dilated RV, RVSP markedly increased with est. Peak pressure at least 69 mmHg, severely dilated RA/LA, moderate AS, trivial AR/MR/KY, mild TR, dilated IVC, no pericardial effusion  -Dilated cardiomyopathy  -Aortic stenosis with mean gradient of 37 mmHg with mildly low EF by cath 9/28/17; Echo 10/2 with max PG 48.41 mmHg, mean PG 34.83 mmHg  -Moderate pulmonary hypertension with pulmonary vascular resistance of 4.425 Wood units consistent with the combination etiology of left ventricular dysfunction with left ventricular end-diastolic pressure of 31 mmHg, and sleep apnea and lung disease (Cath 9/28/17). -HTN  -Documented Hx DM, although pt denies and states A1c was 4.9    Pt recently started seeing Dr. Saroj Ng at [facility]    Plan:     -Pt had echo this morning, pt with moderate AS and RVSP peak at least 69 mmHg  -Pending completion of final pre-operative testing  -Will continue to follow along    Subjective:     No new complaints. He denies any chest pain or shortness of breath. He is concerned about his prognosis.     Objective:      Patient Vitals for the past 8 hrs:   Temp Pulse Resp BP SpO2   10/02/17 1215 98.2 °F (36.8 °C) 89 19 (!) 163/105 96 %   10/02/17 0534 97.8 °F (36.6 °C) 98 19 (!) 167/99 92 %         Patient Vitals for the past 96 hrs:   Weight   10/02/17 0447 149.1 kg (328 lb 12.8 oz)   10/01/17 0217 147.8 kg (325 lb 12.8 oz)   09/30/17 0628 147.2 kg (324 lb 9.6 oz)         Intake/Output Summary (Last 24 hours) at 10/02/17 1237  Last data filed at 10/02/17 0100   Gross per 24 hour   Intake              550 ml   Output             1050 ml   Net             -500 ml       Physical Exam:  General:  alert, cooperative, no distress, appears stated age  Lungs:  clear to auscultation bilaterally, decreased breath sounds at right base  Heart:  Irregular rhythm, 3/6 PRERNA throughout precordium  Extremities:  edema to bilateral lower extremities with compression stockings in place    Data Review:     Labs: Results:       Chemistry Recent Labs      10/02/17   0022  10/01/17   0024  09/30/17   1015  09/29/17   1645   GLU  113*  102*  112*  105*   NA  136  140  140  142   K  3.9  3.9  4.7  3.6   CL  100  103  103  103   CO2  30  29  30  31   BUN  22*  23*  22*  20*   CREA  0.96  1.05  1.01  1.40*   CA  8.9  8.8  8.9  9.0   AGAP  6  8  7  8   BUCR  23*  22*  22*  14   AP   --    --    --   96   TP   --    --    --   6.4   ALB   --    --    --   3.5   GLOB   --    --    --   2.9   AGRAT   --    --    --   1.2      CBC w/Diff Recent Labs      10/02/17   0022  10/01/17   0024  09/30/17   0014   WBC  7.4  7.1  7.2   RBC  4.15*  4.14*  4.10*   HGB  13.7  13.5  13.1   HCT  41.1  40.9  40.6   PLT  102*  96*  100*   GRANS  64  61  63   LYMPH  22  21  23   EOS  2  2  1      Liver Enzymes Recent Labs      09/29/17   1645   TP  6.4   ALB  3.5   AP  96   SGOT  38*      Digoxin

## 2017-10-02 NOTE — PROGRESS NOTES
[facility]      Patient seen in echo lab. No complaints. Echo shows LV dilated with EF 45%, diffuse hypokinesis and hypertropy. RV dilated with reduced function with peak pressures at least 69. Aortic valve with moderate stenosis with valve area 1.2 cm and gradient 35. Venous duplex with chronic non-occlusive DVTs (right FV to popliteal, left popliteal v). Ct Chest with 5.3 cm Asc Ao. Vein mapping and carotids to be done today or tomorrow morning. PLAN:  Await carotids duplex and vein mapping interpretation. Continue ASA, coreg, statin, lovenox and diuresis. CARDIOTHORACIC ATTENDING STAFF NOTE    Patient resting comfortably in chair. No significant events in past 24 hours. Denies chest pain, but still with exertional dyspnea with walking the halls. Stable VS.   RA ox 96%. Echo today demonstrated moderate AS (mean 35, SKYLAR 1.2 cm2) along with mild depression in LV function (EF 45%) with markedly dilated LV, RV (with RV dysfunction), and atria bilaterally. Mild TR and MR. PFTs demonstrate severe pulmonary dysfunction (FVC 45% of predicted, FEV-1 44% of predicted, and DLCO 61%), with baseline room air pO2 only 61 (in the setting of no history of smoking except a pipe). Carotid duplex scan negative, venous mapping shows large greater saphenous veins bilaterally (we will repeat study with our PAs tomorrow). I had a discussion with the patient today about the results of all of his studies the past few days. As expected, with his severe pulmonary hypertension, his RV is greatly enlarged and weak (though the significant TR noted on an earlier echo was not present today). His AS is moderate, and in and of itself would not require intervention.   However, with his ascending aorta at 5.3 cm and significant CAD, if surgical coronary revascularization is to be undertaken, it will by necessity need to include AVR and replacement of his ascending aorta (or aortorrhaphy). This is an extensive operation in the setting of the degree of pulmonary hypertension that he has, and I am concerned about the state of his RV at the conclusion of such an operation, specifically that he may need RV support in the form of pulmonary vasodilatation (with nitric oxide) vs mechanical support. I think we need to consider either a percutaneous solution (PCI) or performance of the procedure at a center with availability of RV-failure treatments. I will ask Pulmonary Medicine to consult on the patient to see if there are any therapies they would recommend to address his pulmonary status, and will also seek input from both Interventional Cardiology here and Dr Prashant Harris (Cardiac Surgery in [place]).     Melissa Guevara MD

## 2017-10-02 NOTE — PROGRESS NOTES
TRANSFER - OUT REPORT:    Verbal report given to JACINTO Vale(name) on Janine Bryan  being transferred to [address] [place] [address](unit) for routine progression of care       Report consisted of patients Situation, Background, Assessment and   Recommendations(SBAR). Information from the following report(s) SBAR, Kardex and MAR was reviewed with the receiving nurse. Lines:   Peripheral IV 09/29/17 Left Antecubital (Active)   Site Assessment Clean, dry, & intact 10/1/2017  8:00 PM   Phlebitis Assessment 0 10/1/2017  8:00 PM   Infiltration Assessment 0 10/1/2017  8:00 PM   Dressing Status Clean, dry, & intact 10/1/2017  8:00 PM   Dressing Type Transparent 10/1/2017  8:00 PM   Hub Color/Line Status Pink 10/1/2017  8:00 PM        Opportunity for questions and clarification was provided.       Patient transported with:   Monitor  Registered Nurse

## 2017-10-02 NOTE — ROUTINE PROCESS
Bedside, Verbal and Written shift change report given to WILD Dozier RN (oncoming nurse) by [employer]). Report included the following information SBAR, Kardex, and MAR. Hourly rounding and  chart checks completed.

## 2017-10-02 NOTE — ROUTINE PROCESS
Primary Nurse Dominique Pennington RN and ELTON Galeas RN performed a dual skin assessment on this patient No impairment noted  Angel score is 20

## 2017-10-02 NOTE — PROCEDURES
[facility]  *** [facility]AL RE[facility] ***    Name: Roselyn Márquez  MRN: [id number]    Inpatient  : 1950  HIS Order #: [id number]  TRAKnfrancisco Visit #: [id number]  Date: 02 Oct 2017    TYPE OF TEST: Cerebrovascular Duplex    REASON FOR TEST  Carotid bruit    Right Carotid:-             Proximal               Mid                 Distal  cm/s  Systolic  Diastolic  Systolic  Diastolic  Systolic  Diastolic  CCA:     93.5      10.0       45.0      15.0       58.0      20.0  Bulb:  ECA:     66.0      11.0  ICA:     36.0      12.0       65.0      26.0       49.0      18.0  ICA/CCA:  1.1       1.3    ICA Stenosis: <50%    Right Vertebral:-  Finding: Antegrade  Sys:       33.0  Veronica:       15.0    Right Subclavian: Normal    Left Carotid:-            Proximal                Mid                 Distal  cm/s  Systolic  Diastolic  Systolic  Diastolic  Systolic  Diastolic  CCA:     48.2      11.0       47.0      12.0       44.0      11.0  Bulb:  ECA:     37.0       6.0  ICA:     39.0      13.0       31.0      13.0       39.0      13.0  ICA/CCA:  0.8       1.2    ICA Stenosis: <50%    Left Vertebral:-  Finding: Antegrade  Sys:       36.0  Veronica:       17.0    Left Subclavian: Normal    INTERPRETATION/FINDINGS  Duplex images were obtained using 2-D gray scale, color flow, and  spectral Doppler analysis. 1. Bilateral <50% stenosis of the internal carotid arteries. 2. No significant stenosis in the external carotid arteries  bilaterally. 3. Antegrade flow in both vertebral arteries. 4. Normal flow in both subclavian arteries. Plaque Morphology:  1. Calcified plaque in the bulb and right ICA. 2. Calcified plaque in the bulb and left ICA. ADDITIONAL COMMENTS  No previous study available for comparison. I have personally reviewed the data relevant to the interpretation of  this  study.     TECHNOLOGIST: Rosanne Rosario, [address] [place] [address]  Signed: 10/02/2017 04:01 PM    PHYSICIAN: Shaka Coats MD  Signed: 10/02/2017 04:37 PM

## 2017-10-02 NOTE — ROUTINE PROCESS
TRANSFER - IN REPORT:    Verbal report received from [facility]) on Fay Jeff  being received from [facility] (unit) for routine progression of care      Report consisted of patients Situation, Background, Assessment and   Recommendations(SBAR). Information from the following report(s) SBAR, Kardex and MAR was reviewed with the receiving nurse. Opportunity for questions and clarification was provided. Assessment completed upon patients arrival to unit and care assumed.

## 2017-10-02 NOTE — PROGRESS NOTES
[facility] Hospitalist Group  Progress Note    Patient: Maria De Jesus Hernandez Age: 78 y.o. : 1950 MR#: [id number] SSN: [id number]  Date: 10/2/2017     Subjective:     Denies F/C, N/V, CP, SOB @ rest. Expresses concern about possible outcomes/procedures, and is hoping for the best.     Consultant notes reviewed and appreciated. Assessment/Plan:   1. Severe AStenosis, severe 3v CADz - has had no symptoms while here, though reports he'd like get SOB with walking 10-15 feet. CTSurgery eval'ing: CABG vs TAVR c PCI. Will f/u on testing and recs. Remains on tx dose Lovenox, ASA, BBlocker, statin and Lasix. No symptoms presently. Continuing pre-op w/u, echo/carotids/vein mapping results reviewed, pasted below. 2. DM - SSI. BSugars controlled. 3. HTN - BPs with some elevations. Will defer to cardiology/CTSurgery for dose adjustments. 4. Acute on chronic systolic CHF exac - compensated, med tx as above. No acute. 5. Hyperlipidemia - statin. 6. Chronic AFib - rate controlled. ASA, tx dose Lovenox. 7. Chronic, nonocclusive BLE DVT - as above, on tx dose Lovenox. 8. Thrombocytopenia - unknown chronicity. No prior labs in [facility] or [facility]. Plt counts stable. Additional Notes:    10/2 Echo: with EF 45-50%, mild diffuse hypokinesis with regional variations, RVPP at least 69mmHg. AValve area 1.2cm^2.     10/2 B/L Carotid Duplex: with B/L <50% stenosis of ICAs, no significant stenosis ECAs, see report. 10/2 BLE Vein Mapping:  Right leg :  1. The great saphenous vein appears to be an adequate sized conduit for Coronary bypass - the smallest diameter is 3.6 mm. There are several superficial varicosities noted in the calf. 2. Small saphenous vein not mapped. Left leg :  1. The great saphenous vein appears to be an adequate sized conduit for Coronary bypass - the smallest diameter is 4.1 mm. There are numerous superficial varisosities noted in the calf.   2. Small saphenous vein not mapped.  CT chest:  IMPRESSION:  Fusiform dilatation of the ascending aorta with maximum diameter of 5.3 cm. Scattered minimal plaque in the thoracic aorta. Pulmonary arterial enlargement with the main pulmonary artery measuring 5.5 cm. Small pericardial effusion. A bilateral solitary calcified granuloma vs. a calcified pleural plaque. Nonobstructing left intrarenal calculus    Case discussed with:  [x]Patient  []Family  []Nursing  []Case Management  DVT Prophylaxis:  [x]Lovenox  []Hep SQ  []SCDs  []Coumadin   []On Heparin gtt    Objective:   VS:   Visit Vitals    BP (!) 163/105    Pulse 89    Temp 98.2 °F (36.8 °C)    Resp 19    Ht 5' 11\" (1.803 m)    Wt 149.1 kg (328 lb 12.8 oz)    SpO2 96%    BMI 45.86 kg/m2      Tmax/24hrs: Temp (24hrs), Av.4 °F (36.9 °C), Min:97.8 °F (36.6 °C), Max:98.8 °F (37.1 °C)      Intake/Output Summary (Last 24 hours) at 10/02/17 1655  Last data filed at 10/02/17 0100   Gross per 24 hour   Intake              250 ml   Output              650 ml   Net             -400 ml       General:  Awake, alert, NAD, in chair. Cardiovascular:  iRRR c 3/6 PRERNA. Pulmonary:  CTA B.  GI:  Soft, obese, NT/ND, NABS. Extremities:  No CT, 2+ BLE chronic edema.    Additional:      Labs:    Recent Results (from the past 24 hour(s))   GLUCOSE, POC    Collection Time: 10/01/17  5:20 PM   Result Value Ref Range    Glucose (POC) 106 70 - 110 mg/dL   GLUCOSE, POC    Collection Time: 10/01/17 10:46 PM   Result Value Ref Range    Glucose (POC) 116 (H) 70 - 110 mg/dL   CBC WITH AUTOMATED DIFF    Collection Time: 10/02/17 12:22 AM   Result Value Ref Range    WBC 7.4 4.6 - 13.2 K/uL    RBC 4.15 (L) 4.70 - 5.50 M/uL    HGB 13.7 13.0 - 16.0 g/dL    HCT 41.1 36.0 - 48.0 %    MCV 99.0 (H) 74.0 - 97.0 FL    MCH 33.0 24.0 - 34.0 PG    MCHC 33.3 31.0 - 37.0 g/dL    RDW 14.4 11.6 - 14.5 %    PLATELET 749 (L) 630 - 420 K/uL    MPV 11.7 9.2 - 11.8 FL    NEUTROPHILS 64 40 - 73 %    LYMPHOCYTES 22 21 - 52 %    MONOCYTES 12 (H) 3 - 10 %    EOSINOPHILS 2 0 - 5 %    BASOPHILS 0 0 - 2 %    ABS. NEUTROPHILS 4.7 1.8 - 8.0 K/UL    ABS. LYMPHOCYTES 1.7 0.9 - 3.6 K/UL    ABS. MONOCYTES 0.9 0.05 - 1.2 K/UL    ABS. EOSINOPHILS 0.2 0.0 - 0.4 K/UL    ABS.  BASOPHILS 0.0 0.0 - 0.1 K/UL    DF AUTOMATED     METABOLIC PANEL, BASIC    Collection Time: 10/02/17 12:22 AM   Result Value Ref Range    Sodium 136 136 - 145 mmol/L    Potassium 3.9 3.5 - 5.5 mmol/L    Chloride 100 100 - 108 mmol/L    CO2 30 21 - 32 mmol/L    Anion gap 6 3.0 - 18 mmol/L    Glucose 113 (H) 74 - 99 mg/dL    BUN 22 (H) 7.0 - 18 MG/DL    Creatinine 0.96 0.6 - 1.3 MG/DL    BUN/Creatinine ratio 23 (H) 12 - 20      GFR est AA >60 >60 ml/min/1.73m2    GFR est non-AA >60 >60 ml/min/1.73m2    Calcium 8.9 8.5 - 10.1 MG/DL   GLUCOSE, POC    Collection Time: 10/02/17  7:20 AM   Result Value Ref Range    Glucose (POC) 128 (H) 70 - 110 mg/dL   GLUCOSE, POC    Collection Time: 10/02/17 12:14 PM   Result Value Ref Range    Glucose (POC) 100 70 - 110 mg/dL   GLUCOSE, POC    Collection Time: 10/02/17  4:17 PM   Result Value Ref Range    Glucose (POC) 106 70 - 110 mg/dL       Signed By: Laurie Valdivia MD     October 2, 2017 2:35 PM

## 2017-10-02 NOTE — PROGRESS NOTES
met with patient, completed the initial Spiritual Assessment of the patient, and offered Pastoral Care, see flow sheets for interventions. He spoke of his [place] and and a rededication of his life to that yeni. Pastoral support provided. Patient does not have any Tenriism/cultural needs that will affect patients preferences in health care. Chart reviewed. Chaplains will continue to follow and will provide pastoral care on an as needed/as requested basis. Delvis Zuniga MDiv.   Board Certified [employer] [phone]

## 2017-10-02 NOTE — PROCEDURES
[facility]  *** FINAL REPORT ***    Name: Vahid Pham  MRN: [id number]    Inpatient  : 1950  HIS Order #: [id number]  [address] [facility] Visit #: [id number]  Date: 02 Oct 2017    TYPE OF TEST: Peripheral Venous Testing    REASON FOR TEST  Venous mapping    Right Leg:-  Deep venous thrombosis:           Not examined  Superficial venous thrombosis:    Not examined  Deep venous insufficiency:        Not examined  Superficial venous insufficiency: Not examined    Left Leg:-  Deep venous thrombosis:           Not examined  Superficial venous thrombosis:    Not examined  Deep venous insufficiency:        Not examined  Superficial venous insufficiency: Not examined    Vein Mapping:    Diam.   Depth  (mm)    Right Great Saphenous Vein:    High Thigh:      5.1    Mid Thigh:       5.0    Low Thigh:       3.6    Knee:            6.4    High Calf:       5.3    Low Calf:        5.9    Ankle:    Right Small Saphenous Vein:    SPJ:    Mid Calf: Ankle:    G[place]omini:  Accessory saph.:  Garcia :  Eino Esthela :    Left Great Saphenous Vein:    High Thigh:      7.1    Mid Thigh:       5.3    Low Thigh:       5.0    Knee:            5.0    High Calf:       5.8    Low Calf:        4.1    Ankle:    Left Small Saphenous Vein:    SPJ:    Mid Calf: Ankle:    G[place]omini:  Accessory saph.:  Garcia :  Mendes :      INTERPRETATION/FINDINGS  Duplex images were obtained using 2-D gray scale, color flow, and  spectral Doppler analysis. Right leg :  1. The great saphenous vein appears to be an adequate sized conduit  for Coronary bypass - the smallest diameter is 3.6 mm. There are  several superficial varicosities noted in the calf. 2. Small saphenous vein not mapped. Left leg :  1. The great saphenous vein appears to be an adequate sized conduit  for Coronary bypass - the smallest diameter is 4.1 mm. There are  numerous superficial varisosities noted in the calf.   2. Small saphenous vein not mapped. ADDITIONAL COMMENTS    I have personally reviewed the data relevant to the interpretation of  this  study.     TECHNOLOGIST: Gaby Whelan, [address] [place] [address], [facility]  Signed: 10/02/2017 04:07 PM    PHYSICIAN: Jeevan Luz MD  Signed: 10/02/2017 04:37 PM

## 2017-10-03 NOTE — PROGRESS NOTES
Problem: Falls - Risk of  Goal: *Absence of Falls  Document Fatimah Fall Risk and appropriate interventions in the flowsheet.    Fall Risk Interventions:  Mobility Interventions: Utilize walker, cane, or other assitive device           Medication Interventions: Bed/chair exit alarm     Elimination Interventions: Call light in reach

## 2017-10-03 NOTE — PROGRESS NOTES
[facility] Hospitalist Group  Progress Note    Patient: Yenny Krishna Age: 78 y.o. : 1950 MR#: [id number] SSN: [id number]  Date: 10/3/2017     Subjective:     Pt with mild orthopnea, but able to lay flat for short periods. Afebrile. No cp. Discussed plan. Voices understanding. Consultant notes reviewed and appreciated. Assessment/Plan:   1. Severe AStenosis, severe 3v CADz - CTSurgery eval'ing: CABG vs TAVR c PCI. Noted difficulty with pHTN and may need tertiary center that specializes in pHTN if no significant improvement with aggressive diuresis. Agree with the possible need for repeat RHC once diuresed. Will f/u on testing and recs. Remains on tx dose Lovenox, ASA, BBlocker, statin and Lasix. No symptoms presently. Continuing pre-op w/u, echo/carotids/vein mapping results reviewed, pasted below. Understand that optimal treatment would be AVR, Aortic root replacement with cabg. Noted discussion of [address] vs antiplatelets in terms of which procedures would require what and delays that may persist.  2. DM - SSI. BSugars controlled. 3. HTN - BPs with some elevations. Will defer to cardiology/CTSurgery for dose adjustments. 4. Acute on chronic systolic CHF exac - compensated, med tx as above. No acute. 5. Hyperlipidemia - statin. 6. Chronic AFib - rate controlled. ASA, tx dose Lovenox. 7. Chronic, nonocclusive BLE DVT - as above, on tx dose Lovenox. 8. Thrombocytopenia - unknown chronicity. No prior labs in [facility] or [facility]. Plt counts stable. Additional Notes:    10/2 Echo: with EF 45-50%, mild diffuse hypokinesis with regional variations, RVPP at least 69mmHg. AValve area 1.2cm^2.     10/2 B/L Carotid Duplex: with B/L <50% stenosis of ICAs, no significant stenosis ECAs, see report. 10/2 BLE Vein Mapping:  Right leg :  1. The great saphenous vein appears to be an adequate sized conduit for Coronary bypass - the smallest diameter is 3.6 mm.  There are several superficial varicosities noted in the calf. 2. Small saphenous vein not mapped. Left leg :  1. The great saphenous vein appears to be an adequate sized conduit for Coronary bypass - the smallest diameter is 4.1 mm. There are numerous superficial varisosities noted in the calf. 2. Small saphenous vein not mapped.  CT chest:  IMPRESSION:  Fusiform dilatation of the ascending aorta with maximum diameter of 5.3 cm. Scattered minimal plaque in the thoracic aorta. Pulmonary arterial enlargement with the main pulmonary artery measuring 5.5 cm. Small pericardial effusion. A bilateral solitary calcified granuloma vs. a calcified pleural plaque. Nonobstructing left intrarenal calculus    Case discussed with:  [x]Patient  []Family  []Nursing  []Case Management  DVT Prophylaxis:  [x]Lovenox  []Hep SQ  []SCDs  []Coumadin   []On Heparin gtt    Objective:   VS:   Visit Vitals    BP (!) 153/110 (BP 1 Location: Left arm, BP Patient Position: Supine)    Pulse 97    Temp 98.3 °F (36.8 °C)    Resp 18    Ht 5' 11\" (1.803 m)    Wt 147.3 kg (324 lb 12.8 oz)    SpO2 97%    BMI 45.3 kg/m2      Tmax/24hrs: Temp (24hrs), Av.7 °F (37.1 °C), Min:98.2 °F (36.8 °C), Max:99.2 °F (37.3 °C)    No intake or output data in the 24 hours ending 10/03/17 1954    General:  Awake, alert, NAD, in chair. Cardiovascular:  iRRR c 3/6 PRERNA. Pulmonary:  CTA B.  GI:  Soft, obese, NT/ND, NABS. Extremities:  No CT, 2+ BLE chronic edema.    Additional:      Labs:    Recent Results (from the past 24 hour(s))   GLUCOSE, POC    Collection Time: 10/02/17  9:59 PM   Result Value Ref Range    Glucose (POC) 132 (H) 70 - 110 mg/dL   GLUCOSE, POC    Collection Time: 10/03/17  6:57 AM   Result Value Ref Range    Glucose (POC) 102 70 - 110 mg/dL   GLUCOSE, POC    Collection Time: 10/03/17  4:20 PM   Result Value Ref Range    Glucose (POC) 105 70 - 110 mg/dL       Signed By: Gerda Rooney MD     October 3, 2017 2:35 PM

## 2017-10-03 NOTE — ROUTINE PROCESS
Bedside and Verbal shift change report given to JACINTO Garcia (oncoming nurse) by Bernadette Avalos (offgoing nurse). Report included the following information SBAR, Kardex, Procedure Summary and Cardiac Rhythm A-Fib.

## 2017-10-03 NOTE — PROGRESS NOTES
[address] Cardiology input. Awaiting Pulmonology input. I have discussed his case with Dr Dee Allison at [facility] in [place] as they has percutaneous RVAD support devices along with nitric oxide (should he have trouble weaning from bypass or in the early postoperative period from RV dysfunction). He will review the patient's films and records today and convey his input shortly. Running quite hypertensive and intermittently tachycardic. Probably would benefit from an increased carvedilol dose.

## 2017-10-03 NOTE — ROUTINE PROCESS
Bedside shift change report given to Liborio Michele [place] (oncoming nurse) by Yisel Falk RN (offgoing nurse). Report included the following information SBAR, ED Summary, MAR and Recent Results.

## 2017-10-03 NOTE — PROGRESS NOTES
Care Management Interventions  PCP Verified by CM: Yes  Mode of Transport at Discharge:  (family)  Transition of Care Consult (CM Consult): Discharge Planning  Physical Therapy Consult: No  Occupational Therapy Consult: No  Current Support Network: Lives with Spouse  Confirm Follow Up Transport: Family  Plan discussed with Pt/Family/Caregiver: Yes  Discharge Location  Discharge Placement:  (TBD)    Pt is a 78year old admitted for CAD, chest pain. Pt is alert and oriented and wife Farzad Rojas [phone] at bedside. Pt reports that he lives with his wife and that his daughter Juan Caraballo [phone] is also an emergency contact. Pt reports that prior to admission he was independent in his ADLs and that he has a walker at home. Pt reports that he needs open heart surgery but that he may be too high risk for open heart surgery here. Pt states that he does not know if he will be transferred to another facility. CM will continue to follow for discharge needs.

## 2017-10-03 NOTE — PROGRESS NOTES
[facility]  -  Progress Note      Patient: Lucius Lopez MRN: [id number]  SSN: [id number]    YOB: 1950  Age: 78 y.o. Sex: male      Admit Date: 9/29/2017    Assessment:     Hospital Problems  Never Reviewed          Codes Class Noted POA    * (Principal)Aortic stenosis ICD-10-CM: I35.0  ICD-9-CM: 424.1  9/29/2017 Yes        Coronal hypospadias ICD-10-CM: Q54.0  ICD-9-CM: 752.61  9/29/2017 Yes        Chronic atrial fibrillation (HCC) (Chronic) ICD-10-CM: I48.2  ICD-9-CM: 427.31  9/29/2017 Yes        Chest pain ICD-10-CM: R07.9  ICD-9-CM: 786.50  9/29/2017 Yes        Fluid overload ICD-10-CM: E87.70  ICD-9-CM: 276.69  9/29/2017 Yes        Tricuspid regurgitation ICD-10-CM: I07.1  ICD-9-CM: 397.0  9/29/2017 Yes        Chronic anticoagulation (Chronic) ICD-10-CM: Z79.01  ICD-9-CM: V58.61  9/29/2017 Yes        Diabetic retinopathy associated with type 2 diabetes mellitus [facility] ICD-10-CM: E11.319  ICD-9-CM: 250.50, 362.01  9/29/2017 Yes        Coronary artery disease involving native coronary artery of native heart without angina pectoris (Chronic) ICD-10-CM: I25.10  ICD-9-CM: 414.01  9/28/2017 Yes        Diabetes mellitus with no complication (HCC) (Chronic) ICD-10-CM: E11.9  ICD-9-CM: 250.00  6/12/2017 Yes    Overview Signed 9/29/2017  5:46 AM by Cathleen Jones MD     Last Assessment & Plan:   No apparent diabetic retinopathy. . Pt told to keep regular primary care and eye appointments to reduce the risk of visual loss from diabetic eye complications, and to follow up immediately for changes in vision.                   -Coronary artery disease, cardiac cath 9/28/17: RECOMMENDATIONS: Multivessel bypass surgery, aortic valve replacement and possible maze procedure or appendage removal for chronic atrial fibrillation. Findings:      1. 30% distal left main disease.      2. Severe 100% occluded proximal LAD disease.      3. Severe ostial disease of more than 80%.       4. Moderate to severe disease in PDA and PLV branches of the RCA. -Chronic atrial fibrillation  -Chronic OAC therapy due to Hx DVT  -Bilateral chronic non-occlusive DVT's by LE doppler 9/29/17   -Echo 10/2/17: dilated LV, EF 45-50%, mild diffuse hypokinesis with regional variations, markedly increased LV wall thickness, indeterminate diastolic function, dilated RV, RVSP markedly increased with est. Peak pressure at least 69 mmHg, severely dilated RA/LA, moderate AS, trivial AR/MR/CO, mild TR, dilated IVC, no pericardial effusion  -Dilated cardiomyopathy  -Aortic stenosis with mean gradient of 37 mmHg with mildly low EF by cath 9/28/17; Echo 10/2 with max PG 48.41 mmHg, mean PG 34.83 mmHg  -Moderate pulmonary hypertension with pulmonary vascular resistance of 4.425 Wood units consistent with the combination etiology of left ventricular dysfunction with left ventricular end-diastolic pressure of 31 mmHg, and sleep apnea and lung disease (Cath 9/28/17). -HTN  -Documented Hx DM, although pt denies and states A1c was 4.9     Pt recently started seeing Dr. Hugh Ivan at [facility]    Plan:     -Will have MD review images of [facility] cath to determine if PCI would be a feasible option. Cardiothoracic surgery note reviewed. -Continue ASA, Coreg, Lasix, Pravachol    Subjective:     No new complaints. Continues to have TREVINO. No SOB otherwise. No chest pain or palpitations.     Objective:      Patient Vitals for the past 8 hrs:   Temp Pulse Resp BP SpO2   10/03/17 0800 98.5 °F (36.9 °C) 93 18 (!) 152/115 94 %   10/03/17 0400 99.1 °F (37.3 °C) 94 19 (!) 149/102 92 %         Patient Vitals for the past 96 hrs:   Weight   10/03/17 0549 147.3 kg (324 lb 12.8 oz)   10/02/17 0447 149.1 kg (328 lb 12.8 oz)   10/01/17 0217 147.8 kg (325 lb 12.8 oz)   09/30/17 0628 147.2 kg (324 lb 9.6 oz)         Intake/Output Summary (Last 24 hours) at 10/03/17 1028  Last data filed at 10/02/17 1625   Gross per 24 hour   Intake              300 ml   Output 350 ml   Net              -50 ml       Physical Exam:  General:  alert, cooperative, no distress, appears stated age, sitting in chair  Lungs:  clear to auscultation bilaterally  Heart:  irregularly irregular rhythm  Extremities:  edema with compression stockings in place    Data Review:     Labs: Results:       Chemistry Recent Labs      10/02/17   0022  10/01/17   0024   GLU  113*  102*   NA  136  140   K  3.9  3.9   CL  100  103   CO2  30  29   BUN  22*  23*   CREA  0.96  1.05   CA  8.9  8.8   AGAP  6  8   BUCR  23*  22*      CBC w/Diff Recent Labs      10/02/17   0022  10/01/17   0024   WBC  7.4  7.1   RBC  4.15*  4.14*   HGB  13.7  13.5   HCT  41.1  40.9   PLT  102*  96*   GRANS  64  61   LYMPH  22  21   EOS  2  2

## 2017-10-03 NOTE — CONSULTS
[facility]  Pulmonary, Critical Care, and Sleep Medicine    Name: Rik Ruffin MRN: [id number]   : 1950 Hospital: [facility]   Date: 10/3/2017        Pulmonary Initial Patient Consult      IMPRESSION:   · CAD- multivessel disease : Cardiac Cath 17  · Pulmonary hypertension with pulmonary vascular resistance of 4.425 Wood units consistent with the combination etiology of left ventricular dysfunction with left ventricular end-diastolic pressure of 31 mmHg (Class 2), and sleep apnea and lung disease (class 3)? CTEPH (class 4) - Pulmonary capillary wedge pressure mean was 33 mmHg  · Chronic Atrial fibrillation  · Aortic stenosis  · Dilated cadiomyopathy  · Chronic anticoagulation- history of DVT: Chronic bilateral non- occlusive DVT : Doppler 17  · HTN  · Morbid Obesity  · MICHAEL- Patient reports diagnosed 15 years ago- CPAP intolerant  · Distant pipe smoker- quit 30 years ago  · Moderate to severe-restrictive lung disease with possible small airway disease- PFTs 10/2/17      RECOMMENDATIONS:   · Pulmonary hypertension- Etiology most likely multifactorial Class 2,3 and possible 4. From a purely pulmonary hypertension management standpoint, PA pressures will be elevated with an elevated PCWP. Ideally would place SWAN-GLADYS with the goal would be to diuresis patient until PCWP is less than 20 then re-evaluate PA pressures. Unclear if patient would tolerate more aggressive diuresis given aortic stenosis and other severe cardiac disease  Most likely would not benefit from vaso-active agents. In fact  depending how much V/Q mismatch is present it could be deleterious. Would avoid phosphodiesterase due to cardiac status- especially aortic stenosis. Could possibly tolerate on an oral endothelin agent  · Recommend that if cardiac surgery is pursue that the patient undergo procedure at facility that has [facility].   · Would obtain V/Q scan to further explore possibility of CTEPH   · Start Spiriva daily and monitor for response  · Will continue to follow       Subjective/History: This patient has been seen and evaluated at the request of Dr. Aziza Negrete  for pulmonary hypertension with severe cardiac disease. Patient is a 78 y.o. male who is sitting in a chair at the time of my  Evaluation. Other than his lower extremity neuropathy  he is currently without pain or discomfort. He has exertional dyspnea at less than 10 feet walking on a flat surface which has been gradually worsening. He does not take inhalers at home. He state that he was diagnosed with MICHAEL over 15 years ago at a facility in [place], [place] but he does not remember the name. At the time he was place on PAP therapy but he discontinued because he could not tolerate the prescribe pressures and he did not follow up. At home he sleep in a bed with 2 pillows. His wife sleeps in another room. He has occasional morning phlegm productive which is clear. No reports of hemoptysis. He is a distant smoker of tobacco (pipe quite 30+ years ago) and [employer] (quit 1970's), Denies any other drug use. Past Medical History:   Diagnosis Date    Aortic stenosis 9/29/2017    Bilateral artificial lens implant 1997, 1999    Chronic anticoagulation 9/29/2017    Chronic atrial fibrillation ([address] 9/29/2017    Coronal hypospadias 9/29/2017    Coronary artery disease involving native coronary artery of native heart without angina pectoris 9/28/2017    Diabetes mellitus with no complication ([address] 6/89/2292    Last Assessment & Plan:  No apparent diabetic retinopathy. . Pt told to keep regular primary care and eye appointments to reduce the risk of visual loss from diabetic eye complications, and to follow up immediately for changes in vision.       Diabetic retinopathy associated with type 2 diabetes mellitus ([address] 9/29/2017    Fluid overload     Gout 9/29/2017    Lesion of soft palate 7/29/2017    ENT planned biopsy    Pseudophakia of both eyes 9/29/2017    Last Assessment & Plan:  Vision stable. IOL's well centered. Pt pleased with visual result post-op OD 1999 and OS 1997. To call if any visual changes.  Retinal hole or tear 6/12/2017    Last Assessment & Plan:  [facility] well surrounded. Stable. Will follow    Tricuspid regurgitation 9/29/2017      Past Surgical History:   Procedure Laterality Date    HX CATARACT REMOVAL Bilateral 1997, 1999    b/l lens implant    HX CHOLECYSTECTOMY      HX KNEE REPLACEMENT      HX SHOULDER REPLACEMENT        Prior to Admission medications    Medication Sig Start Date End Date Taking? Authorizing Provider   cetirizine (ZYRTEC) 10 mg tablet Take  by mouth. Historical Provider   carvedilol (COREG) 3.125 mg tablet Take 3.125 mg by mouth daily. Historical Provider   doxycycline (PERIOSTAT) 20 mg tablet Take 20 mg by mouth two (2) times a day. Historical Provider   furosemide (LASIX) 20 mg tablet Take  by mouth two (2) times daily as needed. Historical Provider   gabapentin (NEURONTIN) 600 mg tablet Take 600 mg by mouth three (3) times daily. Historical Provider   lisinopril (PRINIVIL, ZESTRIL) 10 mg tablet Take 10 mg by mouth daily. Historical Provider   traMADol (ULTRAM) 50 mg tablet Take 50 mg by mouth every six (6) hours as needed for Pain. Historical Provider   traZODone (DESYREL) 50 mg tablet Take 50 mg by mouth nightly. Historical Provider   warfarin (COUMADIN) 5 mg tablet Take 5 mg by mouth daily. Historical Provider   rivaroxaban (XARELTO) 20 mg tab tablet Take 20 mg by mouth daily (with dinner).     Historical Provider     Current Facility-Administered Medications   Medication Dose Route Frequency    furosemide (LASIX) injection 60 mg  60 mg IntraVENous BID    potassium chloride (KLOR-CON) packet 20 mEq  20 mEq Oral TID WITH MEALS    carvedilol (COREG) tablet 6.25 mg  6.25 mg Oral BID WITH MEALS    loratadine (CLARITIN) tablet 10 mg 10 mg Oral DAILY    sodium chloride (NS) flush 5-10 mL  5-10 mL IntraVENous Q8H    aspirin delayed-release tablet 81 mg  81 mg Oral DAILY    enoxaparin (LOVENOX) injection 145 mg  145 mg SubCUTAneous Q12H    gabapentin (NEURONTIN) capsule 600 mg  600 mg Oral TID    pravastatin (PRAVACHOL) tablet 40 mg  40 mg Oral QHS    insulin lispro (HUMALOG) injection   SubCUTAneous AC&HS     No Known Allergies   Social History   Substance Use Topics    Smoking status: Former Smoker    Smokeless tobacco: Never Used    Alcohol use Yes      Family History   Problem Relation Age of Onset    Family history unknown: Yes        Review of Systems:  A comprehensive review of systems was negative except for that written in the HPI. Objective:   Vital Signs:    Visit Vitals    BP (!) 145/107 (BP 1 Location: Left arm, BP Patient Position: Supine)    Pulse 95    Temp 98.7 °F (37.1 °C)    Resp 18    Ht 5' 11\" (1.803 m)    Wt 147.3 kg (324 lb 12.8 oz)    SpO2 95%    BMI 45.3 kg/m2       O2 Device: Room air   O2 Flow Rate (L/min): 2 l/min   Temp (24hrs), Av °F (37.2 °C), Min:98.2 °F (36.8 °C), Max:100.2 °F (37.9 °C)       Intake/Output:   Last shift:         Last 3 shifts: 10/01 1901 - 10/03 0700  In: 575 [P.O.:575]  Out: 975 [Urine:975]    Intake/Output Summary (Last 24 hours) at 10/03/17 1522  Last data filed at 10/02/17 1625   Gross per 24 hour   Intake              300 ml   Output              350 ml   Net              -50 ml       Physical Exam:    General:  Alert, cooperative, no distress, appears stated age. Morbidly obese   Head:  Normocephalic, without obvious abnormality, atraumatic. Eyes:  Conjunctivae/corneas clear. PERRL, EOMs intact. Nose: Nares normal. Septum midline. Mucosa normal. No drainage or sinus tenderness. Throat: Lips, mucosa- normal, Macroglossia, Mallampati:5- unable to view fully posterior oropharynx.     Neck: Supple, symmetrical, trachea midline, no adenopathy, thyroid: no enlargment/tenderness/nodules no JVD. Back:   Symmetric, no curvature. ROM normal.   Lungs:   Clear to auscultation bilaterally. Chest wall:  No tenderness or deformity. Heart:  Regular rate and rhythm, S1, S2 normal, + loud  murmur,  No click, rub or gallop. Abdomen:   Obese Soft, non-tender. Bowel sounds normal. No masses,  No organomegaly- palpated   Extremities: Extremities normal, atraumatic, no cyanosis or edema. - wearing compression stockings   Pulses: 2+ and symmetric all extremities. Skin: Skin color, texture, turgor normal. No rashes or lesions   Lymph nodes:    Cervical, supraclavicular, and nodes normal.   Neurologic: Grossly nonfocal       Data:     Recent Results (from the past 24 hour(s))   GLUCOSE, POC    Collection Time: 10/02/17  4:17 PM   Result Value Ref Range    Glucose (POC) 106 70 - 110 mg/dL   GLUCOSE, POC    Collection Time: 10/02/17  9:59 PM   Result Value Ref Range    Glucose (POC) 132 (H) 70 - 110 mg/dL   GLUCOSE, POC    Collection Time: 10/03/17  6:57 AM   Result Value Ref Range    Glucose (POC) 102 70 - 110 mg/dL        Echo: 10/2/17       SUMMARY:  Left ventricle: Patient is irregular rhythm throughout exam, determining   ejection fraction and SKYLAR is technically  difficult. The ventricle was dilated. Systolic function was mildly reduced by   EF (biplane method of disks). Ejection  fraction was estimated in the range of 45 % to 50 %. There was mild diffuse   hypokinesis with regional variations. Wall  thickness was markedly increased. Right ventricle: The ventricle was dilated. Systolic function was reduced. Systolic pressure was markedly increased. Estimated peak pressure was at least 69 mmHg. Left atrium: The atrium was severely dilated. Right atrium: The atrium was severely dilated. Aortic valve: Leaflets exhibited moderately increased thickness,   calcification, and reduced mobility. There was  moderate stenosis. Valve mean gradient was 35 mmHg.  Estimated aortic valve   area (by VTI) was 1.2 cm-sq. Tricuspid valve: There was mild regurgitation. Aorta, systemic arteries: The root exhibited mild dilatation. INDICATIONS: Shortness of breath. Pulmonary Function Studies: 10/2/17  Moderate restrictive ventilatory defect, Reduced diffusion capacity indicating a decrease in alveolar surface area for gas exchange, flow volume loop suggestive of small airways obstruction    Right Heart Cath: 9/28/17    RIGHT HEART HEMODYNAMICS:  1. Pulmonary wedge pressure mean was 33 mmHg, A-wave was 39  and V-wave was 43 mmHg. 2. PA pressure 88/47 with a mean PA pressure of 63 mmHg. 3. RV 85/13.  4. Right atrial mean pressure is 30 mmHg, A-wave was 40 and V-wave  was 32 mmHg. Imaging:  CT-Chest: 9/30/17: IMPRESSION:     Fusiform dilatation of the ascending aorta with maximum diameter of 5.3 cm. Scattered minimal plaque in the thoracic aorta. Pulmonary arterial enlargement with the main pulmonary artery measuring 5.5 cm. Small pericardial effusion. A bilateral solitary calcified granuloma vs. a calcified pleural plaque. Nonobstructing left intrarenal calculus           10/2/17  Left Subclavian: Normal     INTERPRETATION/FINDINGS  Duplex images were obtained using 2-D gray scale, color flow, and  spectral Doppler analysis. 1. Bilateral <50% stenosis of the internal carotid arteries. 2. No significant stenosis in the external carotid arteries  bilaterally. 3. Antegrade flow in both vertebral arteries. 4. Normal flow in both subclavian arteries. Plaque Morphology:  1. Calcified plaque in the bulb and right ICA. 2. Calcified plaque in the bulb and left ICA.     ADDITIONAL COMMENTS  No previous study available for comparison. I have personally reviewed the patients radiographs and have reviewed the reports:        Total clinical  care time exclusive of procedures: 60 minutes    Aamir Hastings DO, [facility]  Pulmonary, Sleep, Critical Care Medicine  10/03/17

## 2017-10-04 NOTE — PROGRESS NOTES
[facility]  -  Progress Note      Patient: Rashida Smith MRN: [id number]  SSN: [id number]    YOB: 1950  Age: 78 y.o. Sex: male      Admit Date: 9/29/2017    Assessment:     Hospital Problems  Never Reviewed          Codes Class Noted POA    * (Principal)Aortic stenosis ICD-10-CM: I35.0  ICD-9-CM: 424.1  9/29/2017 Yes        Coronal hypospadias ICD-10-CM: Q54.0  ICD-9-CM: 752.61  9/29/2017 Yes        Chronic atrial fibrillation (HCC) (Chronic) ICD-10-CM: I48.2  ICD-9-CM: 427.31  9/29/2017 Yes        Chest pain ICD-10-CM: R07.9  ICD-9-CM: 786.50  9/29/2017 Yes        Fluid overload ICD-10-CM: E87.70  ICD-9-CM: 276.69  9/29/2017 Yes        Tricuspid regurgitation ICD-10-CM: I07.1  ICD-9-CM: 397.0  9/29/2017 Yes        Chronic anticoagulation (Chronic) ICD-10-CM: Z79.01  ICD-9-CM: V58.61  9/29/2017 Yes        Diabetic retinopathy associated with type 2 diabetes mellitus [facility] ICD-10-CM: E11.319  ICD-9-CM: 250.50, 362.01  9/29/2017 Yes        Coronary artery disease involving native coronary artery of native heart without angina pectoris (Chronic) ICD-10-CM: I25.10  ICD-9-CM: 414.01  9/28/2017 Yes        Diabetes mellitus with no complication (HCC) (Chronic) ICD-10-CM: E11.9  ICD-9-CM: 250.00  6/12/2017 Yes    Overview Signed 9/29/2017  5:46 AM by Sabine Magaña MD     Last Assessment & Plan:   No apparent diabetic retinopathy. . Pt told to keep regular primary care and eye appointments to reduce the risk of visual loss from diabetic eye complications, and to follow up immediately for changes in vision.                     -Coronary artery disease, cardiac cath 9/28/17: RECOMMENDATIONS: Multivessel bypass surgery, aortic valve replacement and possible maze procedure or appendage removal for chronic atrial fibrillation. Findings:      1. 30% distal left main disease.      2. Severe 100% occluded proximal LAD disease.      3. Severe ostial disease of more than 80%.       4. Moderate to severe disease in PDA and PLV branches of the RCA. -Chronic atrial fibrillation  -Chronic OAC therapy due to Hx DVT  -Bilateral chronic non-occlusive DVT's by LE doppler 9/29/17   -Echo 10/2/17: dilated LV, EF 45-50%, mild diffuse hypokinesis with regional variations, markedly increased LV wall thickness, indeterminate diastolic function, dilated RV, RVSP markedly increased with est. Peak pressure at least 69 mmHg, severely dilated RA/LA, moderate AS, trivial AR/MR/MN, mild TR, dilated IVC, no pericardial effusion  -Dilated cardiomyopathy  -Aortic stenosis with mean gradient of 37 mmHg with mildly low EF by cath 9/28/17; Echo 10/2 with max PG 48.41 mmHg, mean PG 34.83 mmHg  -Moderate pulmonary hypertension with pulmonary vascular resistance of 4.425 Wood units consistent with the combination etiology of left ventricular dysfunction with left ventricular end-diastolic pressure of 31 mmHg, and sleep apnea and lung disease (Cath 9/28/17).    -HTN  -Documented Hx DM, although pt denies and states A1c was 4.9      Pt recently started seeing Dr. Sherlyn Malave at [facility]    Plan:     -Continue current management with ASA, Pravachol.  -Continue diuresis with IV Lasix 60 mg BID. -Coreg increased to 12.5 mg BID starting tonight.  -Cardiothoracic surgery trying to arrange transfer to [facility], assistance appreciated. -Pulmonary note reviewed, appreciate consult. Subjective:     No new complaints. States SOB improving today with IV Lasix.     Objective:      Patient Vitals for the past 8 hrs:   Temp Pulse Resp BP SpO2   10/04/17 0838 99.6 °F (37.6 °C) 98 18 (!) 156/114 93 %         Patient Vitals for the past 96 hrs:   Weight   10/03/17 0549 147.3 kg (324 lb 12.8 oz)   10/02/17 0447 149.1 kg (328 lb 12.8 oz)   10/01/17 0217 147.8 kg (325 lb 12.8 oz)         Intake/Output Summary (Last 24 hours) at 10/04/17 1208  Last data filed at 10/04/17 0400   Gross per 24 hour   Intake              240 ml   Output              800 ml Net             -560 ml       Physical Exam:  General:  alert, cooperative, no distress, appears stated age  Lungs:  clear to auscultation bilaterally with decreased breath sounds at bases  Heart:  irregularly irregular rhythm with 3/6 PRERNA  Extremities:  edema with compression stockings in place    Data Review:     Labs: Results:       Chemistry Recent Labs      10/02/17   0022   GLU  113*   NA  136   K  3.9   CL  100   CO2  30   BUN  22*   CREA  0.96   CA  8.9   AGAP  6   BUCR  23*      CBC w/Diff Recent Labs      10/02/17   0022   WBC  7.4   RBC  4.15*   HGB  13.7   HCT  41.1   PLT  102*   GRANS  64   LYMPH  22   EOS  2

## 2017-10-04 NOTE — PROGRESS NOTES
CARDIOTHORACIC ATTENDING STAFF NOTE    Patient resting comfortably in chair. No significant events in past 24 hours. Appreciate input from Pulmonology consultant. Well outlined. I spoke with Dr Era Dee of [facility] yseterday. He is willing to accept the patient in transfer to consider TAVR vs open SAVR with CABG and aortic replacement, given that they have nitric oxide and RVAD capabilities fur use perioperatively. However, the patient requested that he be sent to [facility] instead in order for his wife to be able to visit him easier (closer to home). I will discuss with physicians from there today.     Ricarda Urrutia MD

## 2017-10-04 NOTE — CONSULTS
Pulmonology Consult      Subjective:   Consult Note: 10/4/2017 3:10 PM  Referring provider: Isabela  Reason for consult: pulm htn    Subjective:  Pt seen and examined at bedside. No acute events overnight. Pt this morning reports feeling better since admission. Diuresis continues, pt reports having good urinary output and is in good spirits. Pt denies any chest pain, N/V/D, dyspnea at rest     Current Facility-Administered Medications   Medication Dose Route Frequency Provider Last Rate Last Dose    carvedilol (COREG) tablet 12.5 mg  12.5 mg Oral BID WITH MEALS Abilio Santoyo MD        furosemide (LASIX) injection 60 mg  60 mg IntraVENous BID Franci Menjivar MD   60 mg at 10/04/17 1006    potassium chloride (KLOR-CON) packet 20 mEq  20 mEq Oral TID WITH MEALS Franci Menjivar MD   20 mEq at 10/04/17 1207    tiotropium [facility]) inhalation capsule 18 mcg  1 Cap Inhalation DAILY Tim Marquez DO        loratadine (CLARITIN) tablet 10 mg  10 mg Oral DAILY Estevan Enriquez MD   10 mg at 10/04/17 1007    sodium chloride (NS) flush 5-10 mL  5-10 mL IntraVENous Q8H Obed Vega MD   10 mL at 10/03/17 2242    sodium chloride (NS) flush 5-10 mL  5-10 mL IntraVENous PRN Deni Kelley MD        hydrALAZINE (APRESOLINE) 20 mg/mL injection 10 mg  10 mg IntraVENous Q6H PRN Chelsea Meadows MD   10 mg at 09/29/17 0405    aspirin delayed-release tablet 81 mg  81 mg Oral DAILY Chelsea Meadows MD   81 mg at 10/04/17 1007    enoxaparin (LOVENOX) injection 145 mg  145 mg SubCUTAneous Q12H Chelsea Meadows MD   145 mg at 10/04/17 1009    gabapentin (NEURONTIN) capsule 600 mg  600 mg Oral TID Chelsea Meadows MD   600 mg at 10/04/17 1007    pravastatin (PRAVACHOL) tablet 40 mg  40 mg Oral QHS Chelsea Meadows MD   40 mg at 10/03/17 2230    ondansetron (ZOFRAN) injection 4 mg  4 mg IntraVENous Q4H PRN Chelsea Meadows MD        insulin lispro (HUMALOG) injection SubCUTAneous AC&HS Chelsea Meadows MD   Stopped at 17 1630    glucose chewable tablet 16 g  4 Tab Oral PRN Chelsea Meadows MD        glucagon (GLUCAGEN) injection 1 mg  1 mg IntraMUSCular PRN Chelsea Meadows MD        dextrose (D50W) injection syrg 12.5-25 g  25-50 mL IntraVENous PRN Chelsea Meadows MD        traMADol Shearon Se) tablet 50 mg  50 mg Oral Q6H PRN Monica Laird MD   50 mg at 10/04/17 1008          Objective:     Blood pressure 141/84, pulse 100, temperature 98.6 °F (37 °C), resp. rate 19, height 5' 11\" (1.803 m), weight 147.3 kg (324 lb 12.8 oz), SpO2 93 %. Temp (24hrs), Av.7 °F (37.1 °C), Min:98.2 °F (36.8 °C), Max:99.6 °F (37.6 °C)      Intake and Output:  Current Shift:    Last 3 Shifts: 10/02 1901 - 10/04 0700  In: 240 [P.O.:240]  Out: 800 [Urine:800]    Physical Exam:   Visit Vitals    /84 (BP 1 Location: Right arm, BP Patient Position: At rest)    Pulse 100    Temp 98.6 °F (37 °C)    Resp 19    Ht 5' 11\" (1.803 m)    Wt 147.3 kg (324 lb 12.8 oz)    SpO2 93%    BMI 45.3 kg/m2     General appearance: alert, cooperative, no distress, appears stated age, sitting in chair  Head: Normocephalic, without obvious abnormality, atraumatic  Eyes: conjunctivae/corneas clear. PERRL  Throat: Lips, mucosa, and tongue normal. Teeth and gums normal  Lungs: clear to auscultation bilaterally, no wheezes/rales/rhonchi  Heart: regular rate and rhythm, S1, S2 normal, systolic murmur: holosystolic 4/6, crescendo, decrescendo greatest at 2nd left intercostal space, throughout the precordium  Extremities: edema present B/L 2+, pt wearing compression stockings      Lab/Data Review: All lab results for the last 24 hours reviewed. Images:  Personally reviewed, no new images since yesterday      Assessment:     Principal Problem:     Aortic stenosis (2017)    Active Problems:    Coronary artery disease involving native coronary artery of native heart without angina pectoris (9/28/2017)      Diabetes mellitus with no complication ([address] (3/15/5409)      Overview: Last Assessment & Plan:       No apparent diabetic retinopathy. . Pt told to keep regular primary care       and eye appointments to reduce the risk of visual loss from diabetic eye       complications, and to follow up immediately for changes in vision. Coronal hypospadias (9/29/2017)      Chronic atrial fibrillation (HCC) (9/29/2017)      Chest pain (9/29/2017)      Fluid overload (9/29/2017)      Tricuspid regurgitation (9/29/2017)      Chronic anticoagulation (9/29/2017)      Diabetic retinopathy associated with type 2 diabetes mellitus ([address] (9/29/2017)      Impression:  1. Pulmonary HTN: WHO class 2, class 3: PCWP 33 on right heart cath, LVEDP 31mmHg, due to severe aortic stenosis. He also has sleep apnea and lung disease. 2. Afib  3. Severe aortic stenosis  4. Cardiomyopathy  5. MICHAEL/OHV  6. Morbid obesity: Body mass index is 45.3 kg/(m^2). Plan  -as previously noted, continued diuresis and agree with transfer to pul HTN center for treatment of aortic valve. Pt will need reassessment after valve replaced before consideration of vasodilator therapy - currently contraindicated in PHTN in severe left heart disease  -Pt needs V/Q scan to rule out CTEPH, soha in setting of DVT  -Continue spiriva daily  -OOB to chair, ambulate as tolerated  -continuing Lovenox per primary service    We will sign off at this time. Please page/call with any questions or concerns.       Signed By: Bridgett Essex, MD     October 4, 2017    Pulm/CCM

## 2017-10-04 NOTE — PROGRESS NOTES
[facility] Hospitalist Group  Progress Note    Patient: Blanche Mendes Age: 78 y.o. : 1950 MR#: [id number] SSN: [id number]  Date: 10/4/2017     Subjective:     OOB to chair today. Feels better, but obviously with some sob as his sentences are a bit shorter today, despite still being jovial.   Consultant notes reviewed and appreciated. Assessment/Plan:   1. Moderate/Severe AStenosis, severe 3v CADz - CTS appreciated: CABG vs TAVR c PCI. Noted difficulty with pHTN and may need tertiary center that specializes in pHTN if no significant improvement with aggressive diuresis. Agree with the possible need for repeat RHC once diuresed. Will f/u on testing and recs. Remains on tx dose Lovenox, ASA, BBlocker, statin and Lasix. No symptoms presently. Continue pre-op w/u, echo/carotids/vein mapping results reviewed, pasted below. Understand that optimal treatment would be AVR, Aortic root replacement with cabg. Noted discussion of [address] vs antiplatelets in terms of which procedures would require what and delays that may persist. Pt's case has been discussed with Dr. David White of [facility] and they were willing to accept pt in transfer for further evaluation, but pt requests going to [facility] to remain closer to wife. I have reached out to the [facility] Cardiothoracic department, but the office will open up again tomorrow am at 8:30. Will re-attempt then d/t this not being an emergent issue at this time. May consider transfer d/t poor tolerance of po diuresis vs discharge with very quick follow up with [facility] CTS department. 2. DM - SSI. BSugars controlled. 3. HTN - BPs with some elevations. Will defer to cardiology/CTSurgery for dose adjustments. 4. Acute on chronic systolic CHF exac - compensated, med tx as above. No acute. 5. Hyperlipidemia - statin. 6. Chronic AFib - rate controlled. ASA, tx dose Lovenox.    7. Chronic, nonocclusive BLE DVT - as above, on tx dose Lovenox. 8. Thrombocytopenia - unknown chronicity. No prior labs in [facility] or [facility]. Plt counts stable. Additional Notes:    10/2 Echo: with EF 45-50%, mild diffuse hypokinesis with regional variations, RVPP at least 69mmHg. AValve area 1.2cm^2.     10/2 B/L Carotid Duplex: with B/L <50% stenosis of ICAs, no significant stenosis ECAs, see report. 10/2 BLE Vein Mapping:  Right leg :  1. The great saphenous vein appears to be an adequate sized conduit for Coronary bypass - the smallest diameter is 3.6 mm. There are several superficial varicosities noted in the calf. 2. Small saphenous vein not mapped. Left leg :  1. The great saphenous vein appears to be an adequate sized conduit for Coronary bypass - the smallest diameter is 4.1 mm. There are numerous superficial varisosities noted in the calf. 2. Small saphenous vein not mapped.  CT chest:  IMPRESSION:  Fusiform dilatation of the ascending aorta with maximum diameter of 5.3 cm. Scattered minimal plaque in the thoracic aorta. Pulmonary arterial enlargement with the main pulmonary artery measuring 5.5 cm. Small pericardial effusion. A bilateral solitary calcified granuloma vs. a calcified pleural plaque.   Nonobstructing left intrarenal calculus    Case discussed with:  [x]Patient  []Family  []Nursing  []Case Management  DVT Prophylaxis:  [x]Lovenox  []Hep SQ  []SCDs  []Coumadin   []On Heparin gtt    Objective:   VS:   Visit Vitals    /84 (BP 1 Location: Right arm, BP Patient Position: At rest)    Pulse 100    Temp 98.6 °F (37 °C)    Resp 19    Ht 5' 11\" (1.803 m)    Wt 147.3 kg (324 lb 12.8 oz)    SpO2 93%    BMI 45.3 kg/m2      Tmax/24hrs: Temp (24hrs), Av.7 °F (37.1 °C), Min:98.2 °F (36.8 °C), Max:99.6 °F (37.6 °C)      Intake/Output Summary (Last 24 hours) at 10/04/17 1623  Last data filed at 10/04/17 0400   Gross per 24 hour   Intake              240 ml   Output              800 ml   Net             -560 ml General:  Awake, alert, NAD, in chair. Cardiovascular:  iRRR c 3/6 PRERNA. Pulmonary:  CTA B.  GI:  Soft, obese, NT/ND, NABS. Extremities:  No CT, 2+ BLE chronic edema.    Additional:      Labs:    Recent Results (from the past 24 hour(s))   GLUCOSE, POC    Collection Time: 10/03/17 10:32 PM   Result Value Ref Range    Glucose (POC) 123 (H) 70 - 110 mg/dL   GLUCOSE, POC    Collection Time: 10/04/17 11:49 AM   Result Value Ref Range    Glucose (POC) 119 (H) 70 - 809 mg/dL   METABOLIC PANEL, BASIC    Collection Time: 10/04/17  1:14 PM   Result Value Ref Range    Sodium 139 136 - 145 mmol/L    Potassium 3.9 3.5 - 5.5 mmol/L    Chloride 99 (L) 100 - 108 mmol/L    CO2 31 21 - 32 mmol/L    Anion gap 9 3.0 - 18 mmol/L    Glucose 150 (H) 74 - 99 mg/dL    BUN 24 (H) 7.0 - 18 MG/DL    Creatinine 1.04 0.6 - 1.3 MG/DL    BUN/Creatinine ratio 23 (H) 12 - 20      GFR est AA >60 >60 ml/min/1.73m2    GFR est non-AA >60 >60 ml/min/1.73m2    Calcium 8.5 8.5 - 10.1 MG/DL   GLUCOSE, POC    Collection Time: 10/04/17  4:13 PM   Result Value Ref Range    Glucose (POC) 109 70 - 110 mg/dL       Signed By: Alejo Temple MD     October 4, 2017 2:35 PM

## 2017-10-04 NOTE — ROUTINE PROCESS
1900-Bedside shift change report given to Hemant Gill RN (oncoming nurse) by Bo Rivera (offgoing nurse). Report included the following information SBAR, Kardex and MAR. Patient in chair and oob at this time. Tolerated well no sob noted. 0200- Patient awake and alert no signs of distress noted. 0700-Bedside shift change report given to [address] (oncoming nurse) by Hemant Gill RN (offgoing nurse). Report included the following information SBAR, Kardex and MAR.

## 2017-10-05 NOTE — PROGRESS NOTES
[facility] Hospitalist Group  Progress Note    Patient: Vinnie Rodríguez Age: 78 y.o. : 1950 MR#: [id number] SSN: [id number]  Date: 10/5/2017     Subjective:     Pt improving. oob to chair. Discussed plan and pt in agreement. Jovial and conversive. Discussed h/o wt gain on oral lasix in recent past.    Assessment/Plan:   1. Moderate/Severe AStenosis, severe 3v CADz - CTS appreciated: CABG vs TAVR c PCI. Noted difficulty with pHTN and may need tertiary center that specializes in pHTN if no significant improvement with aggressive diuresis. Agree with the possible need for repeat RHC once diuresed. Will f/u on testing and recs. Remains on tx dose Lovenox, ASA, BBlocker, statin and Lasix. No symptoms presently. Continue pre-op w/u, echo/carotids/vein mapping results reviewed, pasted below. Understand that optimal treatment would be AVR, Aortic root replacement with cabg. Noted discussion of [address] vs antiplatelets in terms of which procedures would require what and delays that may persist. Pt's case has been discussed with Dr. Angelito Wells of [facility] and they were willing to accept pt in transfer for further evaluation, but pt requests going to [facility] to remain closer to wife. I have reached out to the [facility] Cardiothoracic department, they agree with diuresing and are willing to follow pt for procedures needed. Would likely be best for an outpt eval by them as soon as pt tolerates po diuresis. Converting pt to oral lasix today to follow overnight. If he does ok, will discharge to home with scheduled CTS follow up with [facility] surgeons. 2. DM - SSI. BSugars controlled. 3. HTN - BPs with some elevations. Will defer to cardiology/CTSurgery for dose adjustments. 4. Acute on chronic systolic CHF exac - compensated, med tx as above. No acute. 5. Hyperlipidemia - statin. 6. Chronic AFib - rate controlled. ASA, tx dose Lovenox.    7. Chronic, nonocclusive BLE DVT - as above, on tx dose Lovenox. 8. Thrombocytopenia - unknown chronicity. No prior labs in [facility] or [facility]. Plt counts stable. Additional Notes:    10/2 Echo: with EF 45-50%, mild diffuse hypokinesis with regional variations, RVPP at least 69mmHg. AValve area 1.2cm^2.     10/2 B/L Carotid Duplex: with B/L <50% stenosis of ICAs, no significant stenosis ECAs, see report. 10/2 BLE Vein Mapping:  Right leg :  1. The great saphenous vein appears to be an adequate sized conduit for Coronary bypass - the smallest diameter is 3.6 mm. There are several superficial varicosities noted in the calf. 2. Small saphenous vein not mapped. Left leg :  1. The great saphenous vein appears to be an adequate sized conduit for Coronary bypass - the smallest diameter is 4.1 mm. There are numerous superficial varisosities noted in the calf. 2. Small saphenous vein not mapped.  CT chest:  IMPRESSION:  Fusiform dilatation of the ascending aorta with maximum diameter of 5.3 cm. Scattered minimal plaque in the thoracic aorta. Pulmonary arterial enlargement with the main pulmonary artery measuring 5.5 cm. Small pericardial effusion. A bilateral solitary calcified granuloma vs. a calcified pleural plaque. Nonobstructing left intrarenal calculus    Case discussed with:  [x]Patient  []Family  []Nursing  []Case Management  DVT Prophylaxis:  [x]Lovenox  []Hep SQ  []SCDs  []Coumadin   []On Heparin gtt    Objective:   VS:   Visit Vitals    /85    Pulse 71    Temp 98.4 °F (36.9 °C)    Resp 20    Ht 5' 11\" (1.803 m)    Wt 148.8 kg (328 lb)    SpO2 93%    BMI 45.75 kg/m2      Tmax/24hrs: Temp (24hrs), Av.5 °F (36.9 °C), Min:98.3 °F (36.8 °C), Max:98.7 °F (37.1 °C)      Intake/Output Summary (Last 24 hours) at 10/05/17 1644  Last data filed at 10/05/17 0420   Gross per 24 hour   Intake              480 ml   Output             1500 ml   Net            -1020 ml       General:  Awake, alert, NAD, in chair. Cardiovascular:  iRRR c 3/6 PRERNA. Pulmonary:  CTA B.  GI:  Soft, obese, NT/ND, NABS. Extremities:  No CT, 2+ BLE chronic edema.    Additional:      Labs:    Recent Results (from the past 24 hour(s))   GLUCOSE, POC    Collection Time: 10/04/17  9:23 PM   Result Value Ref Range    Glucose (POC) 136 (H) 70 - 518 mg/dL   METABOLIC PANEL, BASIC    Collection Time: 10/05/17  3:33 AM   Result Value Ref Range    Sodium 138 136 - 145 mmol/L    Potassium 4.0 3.5 - 5.5 mmol/L    Chloride 99 (L) 100 - 108 mmol/L    CO2 32 21 - 32 mmol/L    Anion gap 7 3.0 - 18 mmol/L    Glucose 137 (H) 74 - 99 mg/dL    BUN 23 (H) 7.0 - 18 MG/DL    Creatinine 1.07 0.6 - 1.3 MG/DL    BUN/Creatinine ratio 21 (H) 12 - 20      GFR est AA >60 >60 ml/min/1.73m2    GFR est non-AA >60 >60 ml/min/1.73m2    Calcium 8.4 (L) 8.5 - 10.1 MG/DL   GLUCOSE, POC    Collection Time: 10/05/17  5:28 AM   Result Value Ref Range    Glucose (POC) 151 (H) 70 - 110 mg/dL   GLUCOSE, POC    Collection Time: 10/05/17 11:25 AM   Result Value Ref Range    Glucose (POC) 87 70 - 110 mg/dL   GLUCOSE, POC    Collection Time: 10/05/17  4:34 PM   Result Value Ref Range    Glucose (POC) 96 70 - 110 mg/dL       Signed By: Scott Nunez MD     October 5, 2017

## 2017-10-05 NOTE — PROGRESS NOTES
CARDIOTHORACIC ATTENDING STAFF NOTE    I spoke with Dr. Saundra Delgado of [facility] regarding evaluation of patient for TAVR. He has agreed to see the patient either emergently or as an outpatient to consider this approach. Apparently there have been additional calls by his covering team to the CT Surgery Team there for evaluation. I will sign off for now and leave his future plans up to the team covering him now so as to avoid confusion.     Singh Lubin MD

## 2017-10-05 NOTE — ROUTINE PROCESS
1900-Bedside shift change report given to Ella Hardwick RN (oncoming nurse) by Danna Shaw (offgoing nurse). Report included the following information SBAR, Kardex and MAR. Family at bedside. 0400- Patient in bed resting at this time, no signs of distress noted. 0700-Bedside shift change report given to Miguel [address] [place] [address] (oncoming nurse) by Ella Hardwick RN (offgoing nurse). Report included the following information SBAR, Kardex and MAR.

## 2017-10-05 NOTE — PROGRESS NOTES
NUTRITION    Nutrition Screen      RECOMMENDATIONS / PLAN:     - Update beverage preference  - No nutrition intervention indicated at this time. Will re-screen as appropriate. NUTRITION INTERVENTIONS & DIAGNOSIS:     Nutrition Diagnosis:  No nutrition diagnosis at this time    ASSESSMENT:     Pt reported good appetite and excellent meal intake PTA and since admission. Beverage preferences were discussed. Pt c/o weight gain PTA. Offered diet education; pt declined. Average po intake adequate to meet patients estimated nutritional needs:   [x] Yes     [] No   [] Unable to determine at this time    Diet: DIET DIABETIC CONSISTENT CARB Regular      Food Allergies:  None known   Current Appetite:   [x] Good     [] Fair     [] Poor     [] Other:  Appetite/meal intake prior to admission:   [x] Good     [] Fair     [] Poor     [] Other:  Feeding Limitations:  [] Swallowing difficulty    [] Chewing difficulty    [] Other:  Current Meal Intake: Patient Vitals for the past 100 hrs:   % Diet Eaten   10/02/17 1625 100 %   10/01/17 1855 100 %   10/01/17 1330 100 %   10/01/17 0951 100 %       BM:  10/2  Skin Integrity:  No pressure ulcer or wound noted  Edema:  2+ LEs  Pertinent Medications: Reviewed    Recent Labs      10/05/17   0333  10/04/17   1314   NA  138  139   K  4.0  3.9   CL  99*  99*   CO2  32  31   GLU  137*  150*   BUN  23*  24*   CREA  1.07  1.04   CA  8.4*  8.5       Intake/Output Summary (Last 24 hours) at 10/05/17 1302  Last data filed at 10/05/17 0420   Gross per 24 hour   Intake              480 ml   Output             2850 ml   Net            -2370 ml       Anthropometrics:  Ht Readings from Last 1 Encounters:   09/30/17 5' 11\" (1.803 m)     Last 3 Recorded Weights in this Encounter    10/02/17 0447 10/03/17 0549 10/05/17 0530   Weight: 149.1 kg (328 lb 12.8 oz) 147.3 kg (324 lb 12.8 oz) 148.8 kg (328 lb)     Body mass index is 45.75 kg/(m^2).           Weight History:  Pt reported weight gain due to fluid PTA    Weight Metrics 10/5/2017 9/29/2017 9/28/2017   Weight 328 lb - 321 lb   BMI - 45.75 kg/m2 44.77 kg/m2        Admitting Diagnosis: CAD  'Light-for-dates' infant with signs of fetal malnutrition  Chest pain  Pertinent PMHx: DM, fluid overload    Education Needs:        [x] None identified  [] Identified - Not appropriate at this time  []  Identified and addressed - refer to education log  Learning Limitations:   [x] None identified  [] Identified    Cultural, Restorationism & ethnic food preferences:  [x] None identified    [] Identified and addressed     ESTIMATED NUTRITION NEEDS:     Calorie deficit of 500 calories for promotion of weight loss  Calories: 9064-7096 kcal (MSJx1.2-1.3) based on  [x] Actual BW: 149 kg      [] IBW   Protein: 86-94 gm (1.1-1.2 gm/kg) based on  [] Actual BW      [x] IBW: 78 kg  Fluid: 1 mL/kcal     MONITORING & EVALUATION:     Nutrition Goal(s):   1. Po intake of meals will meet >75% of patient estimated nutritional needs within the next 7 days.   Outcome:  [] Met/Ongoing    []  Not Met    [x] New/Initial Goal     Monitoring:   [x] Diet tolerance   [x] Meal intake   [] Supplement intake   [] GI symptoms/ability to tolerate po diet   [] Respiratory status   [] Plan of care      Previous Recommendations (for follow-up assessments only):     []   Implemented       []   Not Implemented (RD to address)     [] No Recommendation Made     Discharge Planning:  Diabetic, cardiac diet   [x] Participated in care planning, discharge planning, & interdisciplinary rounds as appropriate      Sabrina Harris, [address] [place] [address]   Pager: [phone]

## 2017-10-05 NOTE — PROGRESS NOTES
[facility] - Progress Note  Admit Date: 9/29/2017    Assessment:     Hospital Problems  Never Reviewed          Codes Class Noted POA    * (Principal)Aortic stenosis ICD-10-CM: I35.0  ICD-9-CM: 424.1  9/29/2017 Yes        Coronal hypospadias ICD-10-CM: Q54.0  ICD-9-CM: 752.61  9/29/2017 Yes        Chronic atrial fibrillation ([facility] (Chronic) ICD-10-CM: I48.2  ICD-9-CM: 427.31  9/29/2017 Yes        Chest pain ICD-10-CM: R07.9  ICD-9-CM: 786.50  9/29/2017 Yes        Fluid overload ICD-10-CM: E87.70  ICD-9-CM: 276.69  9/29/2017 Yes        Tricuspid regurgitation ICD-10-CM: I07.1  ICD-9-CM: 397.0  9/29/2017 Yes        Chronic anticoagulation (Chronic) ICD-10-CM: Z79.01  ICD-9-CM: V58.61  9/29/2017 Yes        Diabetic retinopathy associated with type 2 diabetes mellitus ([facility].) ICD-10-CM: E11.319  ICD-9-CM: 250.50, 362.01  9/29/2017 Yes        Coronary artery disease involving native coronary artery of native heart without angina pectoris (Chronic) ICD-10-CM: I25.10  ICD-9-CM: 414.01  9/28/2017 Yes        Diabetes mellitus with no complication (HCC) (Chronic) ICD-10-CM: E11.9  ICD-9-CM: 250.00  6/12/2017 Yes    Overview Signed 9/29/2017  5:46 AM by Kristian Anderson MD     Last Assessment & Plan:   No apparent diabetic retinopathy. . Pt told to keep regular primary care and eye appointments to reduce the risk of visual loss from diabetic eye complications, and to follow up immediately for changes in vision.                     -Coronary artery disease, cardiac cath 9/28/17: RECOMMENDATIONS: Multivessel bypass surgery, aortic valve replacement and possible maze procedure or appendage removal for chronic atrial fibrillation. Findings:      1. 30% distal left main disease.      2. Severe 100% occluded proximal LAD disease.      3. Severe ostial disease of more than 80%.     4. Moderate to severe disease in PDA and PLV branches of the RCA.   -Chronic atrial fibrillation  -Chronic OAC therapy due to Hx DVT  -Bilateral chronic non-occlusive DVT's by LE doppler 9/29/17   -Echo 10/2/17: dilated LV, EF 45-50%, mild diffuse hypokinesis with regional variations, markedly increased LV wall thickness, indeterminate diastolic function, dilated RV, RVSP markedly increased with est. Peak pressure at least 69 mmHg, severely dilated RA/LA, moderate AS, trivial AR/MR/OK, mild TR, dilated IVC, no pericardial effusion  -Dilated cardiomyopathy  -Aortic stenosis with mean gradient of 37 mmHg with mildly low EF by cath 9/28/17; Echo 10/2 with max PG 48.41 mmHg, mean PG 34.83 mmHg  -Moderate pulmonary hypertension with pulmonary vascular resistance of 4.425 Wood units consistent with the combination etiology of left ventricular dysfunction with left ventricular end-diastolic pressure of 31 mmHg, and sleep apnea and lung disease (Cath 9/28/17).    -HTN  -Documented Hx DM, although pt denies and states A1c was 4.9      Pt recently started seeing Dr. Bharti Calvert at [facility]    Plan:     Patient feels he is less edematous, chronic dyspnea unchanged. He continues to diurese yet weight unchanged from 2 days ago? Renal function remains stable, continue diuresis with IV lasix. Continued on coreg increased yesterday. Continued on ASA and statin. Subjective:     Patient feels he is less edematous, chronic dyspnea unchanged. He continues to diurese yet weight unchanged from 2 days ago.     Objective:      Patient Vitals for the past 8 hrs:   Temp Pulse Resp BP SpO2   10/05/17 0400 98.3 °F (36.8 °C) 74 19 120/83 93 %         Patient Vitals for the past 96 hrs:   Weight   10/05/17 0530 148.8 kg (328 lb)   10/03/17 0549 147.3 kg (324 lb 12.8 oz)   10/02/17 0447 149.1 kg (328 lb 12.8 oz)                    Intake/Output Summary (Last 24 hours) at 10/05/17 1002  Last data filed at 10/05/17 0420   Gross per 24 hour   Intake              480 ml   Output             2850 ml   Net            -2370 ml       Physical Exam:  General:  alert, cooperative  Neck: no JVD  Lungs:  diminished breath sounds   Heart:  regular rate and rhythm 3/6 systolic murmur  Abdomen:  abdomen is soft obese  Extremities: compression stockings with firm pitting edema    Data Review:     Labs: Results:       Chemistry Recent Labs      10/05/17   0333  10/04/17   1314   GLU  137*  150*   NA  138  139   K  4.0  3.9   CL  99*  99*   CO2  32  31   BUN  23*  24*   CREA  1.07  1.04   CA  8.4*  8.5   AGAP  7  9   BUCR  21*  23*      CBC w/Diff No results for input(s): WBC, RBC, HGB, HCT, PLT, GRANS, LYMPH, EOS, HGBEXT, HCTEXT, PLTEXT in the last 72 hours. Cardiac Enzymes No results found for: CPK, CK, CKMMB, CKMB, RCK3, CKMBT, CKNDX, CKND1, ANA, TROPT, TROIQ, YUDITH, TROPT, TNIPOC, BNP, BNPP   Coagulation No results for input(s): PTP, INR, APTT in the last 72 hours. No lab exists for component: INREXT    Lipid Panel No results found for: CHOL, CHOLPOCT, CHOLX, CHLST, CHOLV, 124933, HDL, LDL, LDLC, DLDLP, 557427, VLDLC, VLDL, TGLX, TRIGL, TRIGP, TGLPOCT, CHHD, CHHDX   BNP No results found for: BNP, BNPP, XBNPT   Liver Enzymes No results for input(s): TP, ALB, TBIL, AP, SGOT, GPT in the last 72 hours.     No lab exists for component: DBIL   Digoxin    Thyroid Studies No results found for: T4, T3U, TSH, TSHEXT       Signed By: JUSTUS Amanda     October 5, 2017

## 2017-10-06 NOTE — ROUTINE PROCESS
Bedside, Verbal and Written shift change report given to KEVIN Vazquez RN (oncoming nurse) by Jose Angel CASEY(offgoing nurse). Report included the following information SBAR, Kardex, and MAR. Hourly rounding and  chart checks completed.

## 2017-10-06 NOTE — PROGRESS NOTES
Care Management Interventions  PCP Verified by CM: Yes  Mode of Transport at Discharge:  (family)  Transition of Care Consult (CM Consult): Discharge Planning  Physical Therapy Consult: No  Occupational Therapy Consult: No  Current Support Network: Lives with Spouse  Confirm Follow Up Transport: Family  Plan discussed with Pt/Family/Caregiver: Yes  Discharge Location  Discharge Placement: Home with family assistance    Pt is discharging home today. Pt declines home health. States he doesn't need it and only has a certain amount of days each year from [facility] and doesn't want to use his home health until after his surgery when he really needs it. Pt reports that he already has a couple walkers at home and doesn't need any DME. Pt plans to go home on discharge and has transportation home with family.

## 2017-10-06 NOTE — DISCHARGE INSTRUCTIONS
[facility] Activation    Thank you for requesting access to [facility]. Please follow the instructions below to securely access and download your online medical record. [facility] allows you to send messages to your doctor, view your test results, renew your prescriptions, schedule appointments, and more. How Do I Sign Up? 1. In your internet browser, go to [email or web address]  2. Click on the First Time User? Click Here link in the Sign In box. You will be redirect to the New Member Sign Up page. 3. Enter your [facility] Access Code exactly as it appears below. You will not need to use this code after youve completed the sign-up process. If you do not sign up before the expiration date, you must request a new code. [facility] Access Code: [email or web address]  Expires: 2017  4:36 PM (This is the date your [facility] access code will )    4. Enter the last four digits of your Social Security Number (xxxx) and Date of Birth (mm/dd/yyyy) as indicated and click Submit. You will be taken to the next sign-up page. 5. Create a [facility] ID. This will be your [facility] login ID and cannot be changed, so think of one that is secure and easy to remember. 6. Create a [facility] password. You can change your password at any time. 7. Enter your Password Reset Question and Answer. This can be used at a later time if you forget your password. 8. Enter your e-mail address. You will receive e-mail notification when new information is available in [address]. 9. Click Sign Up. You can now view and download portions of your medical record. 10. Click the Download Summary menu link to download a portable copy of your medical information. Additional Information    If you have questions, please visit the Frequently Asked Questions section of the [facility] [employer] website at [email or web address] Remember, [facility] is NOT to be used for urgent needs. For medical emergencies, dial 911.       Patient armband removed and shredded    DISCHARGE SUMMARY from Nurse    The following personal items are in your possession at time of discharge:    Dental Appliances: None  Visual Aid: None     Home Medications: Sent home  Jewelry: None  Clothing: At bedside, Footwear, Shirt, Shorts  Other Valuables: None  Personal Items Sent to Safe: none          PATIENT INSTRUCTIONS:    After general anesthesia or intravenous sedation, for 24 hours or while taking prescription Narcotics:  · Limit your activities  · Do not drive and operate hazardous machinery  · Do not make important personal or business decisions  · Do  not drink alcoholic beverages  · If you have not urinated within 8 hours after discharge, please contact your surgeon on call. Report the following to your surgeon:  · Excessive pain, swelling, redness or odor of or around the surgical area  · Temperature over 100.5  · Nausea and vomiting lasting longer than 4 hours or if unable to take medications  · Any signs of decreased circulation or nerve impairment to extremity: change in color, persistent  numbness, tingling, coldness or increase pain  · Any questions        What to do at Home:  Recommended activity: Activity as tolerated, with assistance as needed. *  Please give a list of your current medications to your Primary Care Provider. *  Please update this list whenever your medications are discontinued, doses are      changed, or new medications (including over-the-counter products) are added. *  Please carry medication information at all times in case of emergency situations. These are general instructions for a healthy lifestyle:    No smoking/ No tobacco products/ Avoid exposure to second hand smoke    Surgeon General's Warning:  Quitting smoking now greatly reduces serious risk to your health.     Obesity, smoking, and sedentary lifestyle greatly increases your risk for illness    A healthy diet, regular physical exercise & weight monitoring are important for maintaining a healthy lifestyle    You may be retaining fluid if you have a history of heart failure or if you experience any of the following symptoms:  Weight gain of 3 pounds or more overnight or 5 pounds in a week, increased swelling in our hands or feet or shortness of breath while lying flat in bed. Please call your doctor as soon as you notice any of these symptoms; do not wait until your next office visit. Recognize signs and symptoms of STROKE:    F-face looks uneven    A-arms unable to move or move unevenly    S-speech slurred or non-existent    T-time-call 911 as soon as signs and symptoms begin-DO NOT go       Back to bed or wait to see if you get better-TIME IS BRAIN. Warning Signs of HEART ATTACK     Call 911 if you have these symptoms:   Chest discomfort. Most heart attacks involve discomfort in the center of the chest that lasts more than a few minutes, or that goes away and comes back. It can feel like uncomfortable pressure, squeezing, fullness, or pain.  Discomfort in other areas of the upper body. Symptoms can include pain or discomfort in one or both arms, the back, neck, jaw, or stomach.  Shortness of breath with or without chest discomfort.  Other signs may include breaking out in a cold sweat, nausea, or lightheadedness. Don't wait more than five minutes to call 911 - MINUTES MATTER! Fast action can save your life. Calling 911 is almost always the fastest way to get lifesaving treatment. Emergency Medical Services staff can begin treatment when they arrive -- up to an hour sooner than if someone gets to the hospital by car. The discharge information has been reviewed with the patient. The patient verbalized understanding. Discharge medications reviewed with the patient and appropriate educational materials and side effects teaching were provided.

## 2017-10-06 NOTE — DISCHARGE SUMMARY
Hospitalist Discharge Summary     Patient ID:  Raudel Yi  [id number]  1 y.o.  1950    PCP on record: Cheryl Negrete MD    Admit date: 9/29/2017  Discharge date and time: 10/6/2017      DISCHARGE DIAGNOSIS:    1. Moderate/Severe aortic stenosis  2. Insulin dependent DM  3. Acute on chronic systolic CHF  4. Hyperlipidemia  5. Chronic atrial fibrillation  6. Chronic, nonocclusive BLE DVT  7. Thrombocytopenia, chronic      CONSULTATIONS:  IP CONSULT TO CARDIOLOGY  IP CONSULT TO INTENSIVIST   Cardiothoracic Surgery - Dr. Justino Galvan    Excerpted HPI from H&P of Jeremy Avelar MD:  Raudel Yi is a 78 y.o.  male who present with 1 month history of continued weight gain despite trippling  Dieuretic; also with periodic chest pain or pressure associated with nausea  Sent to cardiology had catherization which demonstrates severe AS and multivessel CAD   Clinically has OHS   Blood sugar well controlled last a1c 5.9  Has chronic leg swelling depsite diuretics denies calf swelling or worsening shortness of breath     ______________________________________________________________________  DISCHARGE SUMMARY/HOSPITAL COURSE:  for full details see H&P, daily progress notes, labs, consult notes. Pt was admitted and placed on bipap and stringent diuresis. Pt underwent evaluation and treatment by CTS and cardiology. He was also seen by pulmonary d/t evidence of significant pHTN. Pt underwent and L and R heart catheterization and noted that he would benefit from a cabg d/t multivessel disesase showing the following:   \"1. 30% distal left main disease.      2. Severe 100% occluded proximal LAD disease.      3. Severe ostial disease of more than 80%.     4. Moderate to severe disease in PDA and PLV branches of the RCA. \"    He would also benefit from AV replacement/repair d/t an echo showing     \"Left ventricle: Patient is irregular rhythm throughout exam, determining   ejection fraction and SKYLAR is technically  difficult. The ventricle was dilated. Systolic function was mildly reduced by   EF (biplane method of disks). Ejection  fraction was estimated in the range of 45 % to 50 %. There was mild diffuse   hypokinesis with regional variations. Wall  thickness was markedly increased. Right ventricle: The ventricle was dilated. Systolic function was reduced. Systolic pressure was markedly increased. Estimated peak pressure was at least 69 mmHg. Aortic valve: Leaflets exhibited moderately increased thickness,   calcification, and reduced mobility. There was  moderate stenosis. Valve mean gradient was 35 mmHg. Estimated aortic valve   area (by VTI) was 1.2 cm-sq. \"    It was decided that pt needed to be at a tertiary center for adequate care; a facility that could handle severe pHTN in the setting of the need for such cardiac interventions. Case was discussed with Fariha Sawyer Cardiothoracic Surgery on 10/5/17 by me and also by our [facility] staff, Dr. Mateusz Mandujano. Pt has been diuresed well and is capable of being discharged and followed up as outpt for planning of how we move forward. I did discuss the case with Ms. Ziegler (sp?? ) in the office at [facility] who stated she would work to gather all records for Mr. Scarlett Flynn to assist in moving forward expeditiously. He is ok for discharge on today.     ____________________________________________________________________  DISCHARGE MEDICATIONS:   Current Discharge Medication List      START taking these medications    Details   aspirin delayed-release 81 mg tablet Take 1 Tab by mouth daily. Qty: 30 Tab, Refills: 1      pravastatin (PRAVACHOL) 40 mg tablet Take 1 Tab by mouth nightly. Qty: 30 Tab, Refills: 2      tiotropium (SPIRIVA) 18 mcg inhalation capsule Take 1 Cap by inhalation daily.   Qty: 30 Cap, Refills: 2         CONTINUE these medications which have CHANGED    Details   carvedilol (COREG) 12.5 mg tablet Take 1 Tab by mouth two (2) times daily (with meals). Qty: 60 Tab, Refills: 2      furosemide (LASIX) 40 mg tablet Take 2 Tabs by mouth two (2) times a day. Qty: 120 Tab, Refills: 2      traMADol (ULTRAM) 50 mg tablet Take 1 Tab by mouth every six (6) hours as needed. Max Daily Amount: 200 mg. Qty: 30 Tab, Refills: 0         CONTINUE these medications which have NOT CHANGED    Details   cetirizine (ZYRTEC) 10 mg tablet Take  by mouth.      gabapentin (NEURONTIN) 600 mg tablet Take 600 mg by mouth three (3) times daily. warfarin (COUMADIN) 5 mg tablet Take 5 mg by mouth daily. STOP taking these medications       doxycycline (PERIOSTAT) 20 mg tablet Comments:   Reason for Stopping:         lisinopril (PRINIVIL, ZESTRIL) 10 mg tablet Comments:   Reason for Stopping:         traZODone (DESYREL) 50 mg tablet Comments:   Reason for Stopping:         rivaroxaban (XARELTO) 20 mg tab tablet Comments:   Reason for Stopping:               My Recommended Diet, Activity, Wound Care, and follow-up labs are listed in the patient's Discharge Insturctions which I have personally completed and reviewed.     _______________________________________________________________________  DISPOSITION:    Home with Family: x   Home with HH/PT/OT/RN:    SNF/LTC:    JORGE LUIS:    OTHER:        Condition at Discharge:  Stable  _______________________________________________________________________  Follow up with:   PCP : Gerald Galeazzi, MD  Follow-up Information     Follow up With Details Flaquita Fuentes MD In 1 week hospital discharge f/u [address] [facility] In 1 week CAD, aortic valve stenosis, moderate/severe pulmonary hypertension [place] [address] [id number]  [phone]              Total time in minutes spent coordinating this discharge (includes going over instructions, follow-up, prescriptions, and preparing report for sign off to her PCP) :  60 minutes    Signed:  Mick Howard MD  Internal Medicine  Hospitalist Division

## 2018-01-01 ENCOUNTER — APPOINTMENT (OUTPATIENT)
Dept: GENERAL RADIOLOGY | Age: 68
DRG: 871 | End: 2018-01-01
Attending: EMERGENCY MEDICINE
Payer: MEDICARE

## 2018-01-01 ENCOUNTER — HOME CARE VISIT (OUTPATIENT)
Dept: HOSPICE | Facility: HOSPICE | Age: 68
End: 2018-01-01

## 2018-01-01 ENCOUNTER — APPOINTMENT (OUTPATIENT)
Dept: GENERAL RADIOLOGY | Age: 68
DRG: 871 | End: 2018-01-01
Attending: HOSPITALIST
Payer: MEDICARE

## 2018-01-01 ENCOUNTER — HOSPICE ADMISSION (OUTPATIENT)
Dept: HOSPICE | Facility: HOSPICE | Age: 68
End: 2018-01-01

## 2018-01-01 ENCOUNTER — HOSPITAL ENCOUNTER (INPATIENT)
Age: 68
LOS: 5 days | DRG: 871 | End: 2018-05-13
Attending: EMERGENCY MEDICINE | Admitting: INTERNAL MEDICINE
Payer: MEDICARE

## 2018-01-01 ENCOUNTER — APPOINTMENT (OUTPATIENT)
Dept: ULTRASOUND IMAGING | Age: 68
DRG: 871 | End: 2018-01-01
Attending: INTERNAL MEDICINE
Payer: MEDICARE

## 2018-01-01 VITALS
HEART RATE: 85 BPM | TEMPERATURE: 98.4 F | BODY MASS INDEX: 16.69 KG/M2 | DIASTOLIC BLOOD PRESSURE: 68 MMHG | HEIGHT: 71 IN | SYSTOLIC BLOOD PRESSURE: 104 MMHG | RESPIRATION RATE: 22 BRPM | WEIGHT: 119.2 LBS | OXYGEN SATURATION: 96 %

## 2018-01-01 DIAGNOSIS — R06.03 ACUTE RESPIRATORY DISTRESS: ICD-10-CM

## 2018-01-01 DIAGNOSIS — N19 UREMIA: Primary | ICD-10-CM

## 2018-01-01 DIAGNOSIS — N17.9 AKI (ACUTE KIDNEY INJURY) (HCC): ICD-10-CM

## 2018-01-01 DIAGNOSIS — I50.9 CHRONIC CONGESTIVE HEART FAILURE, UNSPECIFIED HEART FAILURE TYPE (HCC): ICD-10-CM

## 2018-01-01 LAB
ALBUMIN SERPL-MCNC: 2.6 G/DL (ref 3.4–5)
ALBUMIN/GLOB SERPL: 0.6 {RATIO} (ref 0.8–1.7)
ALP SERPL-CCNC: 102 U/L (ref 45–117)
ALT SERPL-CCNC: 38 U/L (ref 16–61)
AMMONIA PLAS-SCNC: 26 UMOL/L (ref 11–32)
ANION GAP SERPL CALC-SCNC: 11 MMOL/L (ref 3–18)
ANION GAP SERPL CALC-SCNC: 11 MMOL/L (ref 3–18)
ANION GAP SERPL CALC-SCNC: 8 MMOL/L (ref 3–18)
APPEARANCE UR: CLEAR
APTT PPP: 38.4 SEC (ref 23–36.4)
APTT PPP: >180 SEC (ref 23–36.4)
ARTERIAL PATENCY WRIST A: ABNORMAL
ARTERIAL PATENCY WRIST A: ABNORMAL
AST SERPL-CCNC: 53 U/L (ref 15–37)
ATRIAL RATE: 75 BPM
ATRIAL RATE: 92 BPM
BACTERIA SPEC CULT: ABNORMAL
BACTERIA SPEC CULT: ABNORMAL
BACTERIA SPEC CULT: NORMAL
BACTERIA URNS QL MICRO: NEGATIVE /HPF
BASE EXCESS BLD CALC-SCNC: 2 MMOL/L
BASE EXCESS BLD CALC-SCNC: 3 MMOL/L
BASOPHILS # BLD: 0 K/UL (ref 0–0.06)
BASOPHILS # BLD: 0 K/UL (ref 0–0.1)
BASOPHILS # BLD: 0.3 K/UL (ref 0–0.1)
BASOPHILS NFR BLD: 0 % (ref 0–2)
BASOPHILS NFR BLD: 0 % (ref 0–3)
BASOPHILS NFR BLD: 1 % (ref 0–3)
BDY SITE: ABNORMAL
BDY SITE: ABNORMAL
BILIRUB SERPL-MCNC: 2 MG/DL (ref 0.2–1)
BILIRUB UR QL: NEGATIVE
BODY TEMPERATURE: 98.9
BUN SERPL-MCNC: 109 MG/DL (ref 7–18)
BUN SERPL-MCNC: 109 MG/DL (ref 7–18)
BUN SERPL-MCNC: 111 MG/DL (ref 7–18)
BUN/CREAT SERPL: 43 (ref 12–20)
BUN/CREAT SERPL: 46 (ref 12–20)
BUN/CREAT SERPL: 52 (ref 12–20)
CALCIUM SERPL-MCNC: 8.2 MG/DL (ref 8.5–10.1)
CALCIUM SERPL-MCNC: 8.7 MG/DL (ref 8.5–10.1)
CALCIUM SERPL-MCNC: 9.2 MG/DL (ref 8.5–10.1)
CALCULATED R AXIS, ECG10: -22 DEGREES
CALCULATED R AXIS, ECG10: -27 DEGREES
CALCULATED T AXIS, ECG11: 128 DEGREES
CALCULATED T AXIS, ECG11: 138 DEGREES
CHLORIDE SERPL-SCNC: 100 MMOL/L (ref 100–108)
CHLORIDE SERPL-SCNC: 101 MMOL/L (ref 100–108)
CHLORIDE SERPL-SCNC: 94 MMOL/L (ref 100–108)
CK MB CFR SERPL CALC: 10.5 % (ref 0–4)
CK MB CFR SERPL CALC: 6.3 % (ref 0–4)
CK MB CFR SERPL CALC: 6.4 % (ref 0–4)
CK MB CFR SERPL CALC: ABNORMAL % (ref 0–4)
CK MB SERPL-MCNC: 2.4 NG/ML (ref 5–25)
CK MB SERPL-MCNC: 2.5 NG/ML (ref 5–25)
CK MB SERPL-MCNC: 4.1 NG/ML (ref 5–25)
CK MB SERPL-MCNC: <1 NG/ML (ref 5–25)
CK SERPL-CCNC: 37 U/L (ref 39–308)
CK SERPL-CCNC: 38 U/L (ref 39–308)
CK SERPL-CCNC: 39 U/L (ref 39–308)
CK SERPL-CCNC: 39 U/L (ref 39–308)
CO2 SERPL-SCNC: 24 MMOL/L (ref 21–32)
CO2 SERPL-SCNC: 26 MMOL/L (ref 21–32)
CO2 SERPL-SCNC: 28 MMOL/L (ref 21–32)
COLOR UR: YELLOW
CREAT SERPL-MCNC: 2.1 MG/DL (ref 0.6–1.3)
CREAT SERPL-MCNC: 2.43 MG/DL (ref 0.6–1.3)
CREAT SERPL-MCNC: 2.55 MG/DL (ref 0.6–1.3)
CREAT UR-MCNC: 92 MG/DL (ref 30–125)
CREAT UR-MCNC: 92.6 MG/DL (ref 30–125)
DIAGNOSIS, 93000: NORMAL
DIAGNOSIS, 93000: NORMAL
DIFFERENTIAL METHOD BLD: ABNORMAL
EOSINOPHIL # BLD: 0 K/UL (ref 0–0.4)
EOSINOPHIL #/AREA URNS HPF: NORMAL /[HPF]
EOSINOPHIL NFR BLD: 0 % (ref 0–5)
EPITH CASTS URNS QL MICRO: ABNORMAL /LPF (ref 0–5)
ERYTHROCYTE [DISTWIDTH] IN BLOOD BY AUTOMATED COUNT: 15.2 % (ref 11.6–14.5)
ERYTHROCYTE [DISTWIDTH] IN BLOOD BY AUTOMATED COUNT: 15.3 % (ref 11.6–14.5)
ERYTHROCYTE [DISTWIDTH] IN BLOOD BY AUTOMATED COUNT: 15.6 % (ref 11.6–14.5)
EST. AVERAGE GLUCOSE BLD GHB EST-MCNC: 114 MG/DL
GAS FLOW.O2 O2 DELIVERY SYS: ABNORMAL L/MIN
GAS FLOW.O2 O2 DELIVERY SYS: ABNORMAL L/MIN
GAS FLOW.O2 SETTING OXYMISER: 2 L/M
GAS FLOW.O2 SETTING OXYMISER: 3 L/M
GLOBULIN SER CALC-MCNC: 4.1 G/DL (ref 2–4)
GLUCOSE BLD STRIP.AUTO-MCNC: 112 MG/DL (ref 70–110)
GLUCOSE BLD STRIP.AUTO-MCNC: 118 MG/DL (ref 70–110)
GLUCOSE BLD STRIP.AUTO-MCNC: 124 MG/DL (ref 70–110)
GLUCOSE BLD STRIP.AUTO-MCNC: 133 MG/DL (ref 70–110)
GLUCOSE BLD STRIP.AUTO-MCNC: 136 MG/DL (ref 70–110)
GLUCOSE BLD STRIP.AUTO-MCNC: 141 MG/DL (ref 70–110)
GLUCOSE BLD STRIP.AUTO-MCNC: 142 MG/DL (ref 70–110)
GLUCOSE BLD STRIP.AUTO-MCNC: 146 MG/DL (ref 70–110)
GLUCOSE BLD STRIP.AUTO-MCNC: 147 MG/DL (ref 70–110)
GLUCOSE BLD STRIP.AUTO-MCNC: 152 MG/DL (ref 70–110)
GLUCOSE BLD STRIP.AUTO-MCNC: 153 MG/DL (ref 70–110)
GLUCOSE BLD STRIP.AUTO-MCNC: 154 MG/DL (ref 70–110)
GLUCOSE BLD STRIP.AUTO-MCNC: 163 MG/DL (ref 70–110)
GLUCOSE BLD STRIP.AUTO-MCNC: 170 MG/DL (ref 70–110)
GLUCOSE BLD STRIP.AUTO-MCNC: 179 MG/DL (ref 70–110)
GLUCOSE BLD STRIP.AUTO-MCNC: 185 MG/DL (ref 70–110)
GLUCOSE BLD STRIP.AUTO-MCNC: 216 MG/DL (ref 70–110)
GLUCOSE SERPL-MCNC: 118 MG/DL (ref 74–99)
GLUCOSE SERPL-MCNC: 145 MG/DL (ref 74–99)
GLUCOSE SERPL-MCNC: 157 MG/DL (ref 74–99)
GLUCOSE UR STRIP.AUTO-MCNC: NEGATIVE MG/DL
GRAM STN SPEC: ABNORMAL
HBA1C MFR BLD: 5.6 % (ref 4.5–5.6)
HCO3 BLD-SCNC: 24.8 MMOL/L (ref 22–26)
HCO3 BLD-SCNC: 25.6 MMOL/L (ref 22–26)
HCT VFR BLD AUTO: 34.1 % (ref 36–48)
HCT VFR BLD AUTO: 35.7 % (ref 36–48)
HCT VFR BLD AUTO: 39.7 % (ref 36–48)
HGB BLD-MCNC: 11.2 G/DL (ref 13–16)
HGB BLD-MCNC: 11.9 G/DL (ref 13–16)
HGB BLD-MCNC: 13.3 G/DL (ref 13–16)
HGB UR QL STRIP: NEGATIVE
HYALINE CASTS URNS QL MICRO: ABNORMAL /LPF (ref 0–2)
INR PPP: 4.7 (ref 0.8–1.2)
INR PPP: 5.5 (ref 0.8–1.2)
INR PPP: 5.6 (ref 0.8–1.2)
INR PPP: 5.8 (ref 0.8–1.2)
KETONES UR QL STRIP.AUTO: NEGATIVE MG/DL
LEUKOCYTE ESTERASE UR QL STRIP.AUTO: ABNORMAL
LYMPHOCYTES # BLD: 0.7 K/UL (ref 0.9–3.6)
LYMPHOCYTES # BLD: 2.1 K/UL (ref 0.8–3.5)
LYMPHOCYTES # BLD: 2.1 K/UL (ref 0.8–3.5)
LYMPHOCYTES NFR BLD: 10 % (ref 20–51)
LYMPHOCYTES NFR BLD: 4 % (ref 21–52)
LYMPHOCYTES NFR BLD: 8 % (ref 20–51)
MAGNESIUM SERPL-MCNC: 2.3 MG/DL (ref 1.6–2.6)
MAGNESIUM SERPL-MCNC: 2.7 MG/DL (ref 1.6–2.6)
MCH RBC QN AUTO: 30 PG (ref 24–34)
MCH RBC QN AUTO: 30.3 PG (ref 24–34)
MCH RBC QN AUTO: 30.5 PG (ref 24–34)
MCHC RBC AUTO-ENTMCNC: 32.8 G/DL (ref 31–37)
MCHC RBC AUTO-ENTMCNC: 33.3 G/DL (ref 31–37)
MCHC RBC AUTO-ENTMCNC: 33.5 G/DL (ref 31–37)
MCV RBC AUTO: 90.8 FL (ref 74–97)
MCV RBC AUTO: 91.1 FL (ref 74–97)
MCV RBC AUTO: 91.4 FL (ref 74–97)
MONOCYTES # BLD: 0.5 K/UL (ref 0.05–1.2)
MONOCYTES # BLD: 1.1 K/UL (ref 0–1)
MONOCYTES # BLD: 1.7 K/UL (ref 0–1)
MONOCYTES NFR BLD: 3 % (ref 3–10)
MONOCYTES NFR BLD: 4 % (ref 2–9)
MONOCYTES NFR BLD: 8 % (ref 2–9)
MUCOUS THREADS URNS QL MICRO: ABNORMAL /LPF
NEUTS BAND NFR BLD MANUAL: 12 % (ref 0–5)
NEUTS BAND NFR BLD MANUAL: 4 % (ref 0–5)
NEUTS SEG # BLD: 14.7 K/UL (ref 1.8–8)
NEUTS SEG # BLD: 15 K/UL (ref 1.8–8)
NEUTS SEG # BLD: 21.9 K/UL (ref 1.8–8)
NEUTS SEG NFR BLD: 70 % (ref 42–75)
NEUTS SEG NFR BLD: 83 % (ref 42–75)
NEUTS SEG NFR BLD: 93 % (ref 40–73)
NITRITE UR QL STRIP.AUTO: NEGATIVE
O2/TOTAL GAS SETTING VFR VENT: 0.28 %
PCO2 BLD: 30.9 MMHG (ref 35–45)
PCO2 BLD: 32.3 MMHG (ref 35–45)
PH BLD: 7.49 [PH] (ref 7.35–7.45)
PH BLD: 7.53 [PH] (ref 7.35–7.45)
PH UR STRIP: 5 [PH] (ref 5–8)
PLATELET # BLD AUTO: 103 K/UL (ref 135–420)
PLATELET # BLD AUTO: 147 K/UL (ref 135–420)
PLATELET # BLD AUTO: 154 K/UL (ref 135–420)
PLATELET COMMENTS,PCOM: ABNORMAL
PMV BLD AUTO: 10.5 FL (ref 9.2–11.8)
PMV BLD AUTO: 10.7 FL (ref 9.2–11.8)
PMV BLD AUTO: 11 FL (ref 9.2–11.8)
PO2 BLD: 88 MMHG (ref 80–100)
PO2 BLD: 89 MMHG (ref 80–100)
POTASSIUM SERPL-SCNC: 4.4 MMOL/L (ref 3.5–5.5)
POTASSIUM SERPL-SCNC: 4.6 MMOL/L (ref 3.5–5.5)
POTASSIUM SERPL-SCNC: 5 MMOL/L (ref 3.5–5.5)
PROT SERPL-MCNC: 6.7 G/DL (ref 6.4–8.2)
PROT UR STRIP-MCNC: NEGATIVE MG/DL
PROT UR-MCNC: 20 MG/DL
PROT/CREAT UR-RTO: 0.2
PROTHROMBIN TIME: 43.7 SEC (ref 11.5–15.2)
PROTHROMBIN TIME: 48.9 SEC (ref 11.5–15.2)
PROTHROMBIN TIME: 49.7 SEC (ref 11.5–15.2)
PROTHROMBIN TIME: 51.1 SEC (ref 11.5–15.2)
Q-T INTERVAL, ECG07: 332 MS
Q-T INTERVAL, ECG07: 398 MS
QRS DURATION, ECG06: 164 MS
QRS DURATION, ECG06: 168 MS
QTC CALCULATION (BEZET), ECG08: 430 MS
QTC CALCULATION (BEZET), ECG08: 494 MS
RBC # BLD AUTO: 3.73 M/UL (ref 4.7–5.5)
RBC # BLD AUTO: 3.93 M/UL (ref 4.7–5.5)
RBC # BLD AUTO: 4.36 M/UL (ref 4.7–5.5)
RBC #/AREA URNS HPF: NEGATIVE /HPF (ref 0–5)
RBC MORPH BLD: ABNORMAL
SAO2 % BLD: 98 % (ref 92–97)
SAO2 % BLD: 98 % (ref 92–97)
SERVICE CMNT-IMP: ABNORMAL
SERVICE CMNT-IMP: NORMAL
SODIUM SERPL-SCNC: 133 MMOL/L (ref 136–145)
SODIUM SERPL-SCNC: 135 MMOL/L (ref 136–145)
SODIUM SERPL-SCNC: 135 MMOL/L (ref 136–145)
SODIUM UR-SCNC: <5 MMOL/L (ref 20–110)
SP GR UR REFRACTOMETRY: 1.01 (ref 1–1.03)
SPECIMEN TYPE: ABNORMAL
SPECIMEN TYPE: ABNORMAL
TOTAL RESP. RATE, ITRR: 23
TROPONIN I SERPL-MCNC: 0.88 NG/ML (ref 0–0.06)
TROPONIN I SERPL-MCNC: 1.13 NG/ML (ref 0–0.06)
TROPONIN I SERPL-MCNC: 1.16 NG/ML (ref 0–0.06)
TROPONIN I SERPL-MCNC: 1.27 NG/ML (ref 0–0.06)
UROBILINOGEN UR QL STRIP.AUTO: 1 EU/DL (ref 0.2–1)
VENTRICULAR RATE, ECG03: 101 BPM
VENTRICULAR RATE, ECG03: 93 BPM
WBC # BLD AUTO: 16.2 K/UL (ref 4.6–13.2)
WBC # BLD AUTO: 21 K/UL (ref 4.6–13.2)
WBC # BLD AUTO: 26.4 K/UL (ref 4.6–13.2)
WBC MORPH BLD: ABNORMAL
WBC URNS QL MICRO: ABNORMAL /HPF (ref 0–5)

## 2018-01-01 PROCEDURE — 74011250636 HC RX REV CODE- 250/636: Performed by: HOSPITALIST

## 2018-01-01 PROCEDURE — 74011000250 HC RX REV CODE- 250: Performed by: HOSPITALIST

## 2018-01-01 PROCEDURE — 84156 ASSAY OF PROTEIN URINE: CPT | Performed by: INTERNAL MEDICINE

## 2018-01-01 PROCEDURE — 93005 ELECTROCARDIOGRAM TRACING: CPT

## 2018-01-01 PROCEDURE — 77030033263 HC DRSG MEPILEX 16-48IN BORD MOLN -B

## 2018-01-01 PROCEDURE — 74011250637 HC RX REV CODE- 250/637: Performed by: INTERNAL MEDICINE

## 2018-01-01 PROCEDURE — 92526 ORAL FUNCTION THERAPY: CPT

## 2018-01-01 PROCEDURE — 36415 COLL VENOUS BLD VENIPUNCTURE: CPT | Performed by: INTERNAL MEDICINE

## 2018-01-01 PROCEDURE — 77030011256 HC DRSG MEPILEX <16IN NO BORD MOLN -A

## 2018-01-01 PROCEDURE — 74011250636 HC RX REV CODE- 250/636: Performed by: INTERNAL MEDICINE

## 2018-01-01 PROCEDURE — 74011250637 HC RX REV CODE- 250/637: Performed by: HOSPITALIST

## 2018-01-01 PROCEDURE — 77010033678 HC OXYGEN DAILY

## 2018-01-01 PROCEDURE — 74011250636 HC RX REV CODE- 250/636: Performed by: EMERGENCY MEDICINE

## 2018-01-01 PROCEDURE — 77030034849

## 2018-01-01 PROCEDURE — 74011636637 HC RX REV CODE- 636/637: Performed by: HOSPITALIST

## 2018-01-01 PROCEDURE — 77030010545

## 2018-01-01 PROCEDURE — 83735 ASSAY OF MAGNESIUM: CPT | Performed by: INTERNAL MEDICINE

## 2018-01-01 PROCEDURE — 82962 GLUCOSE BLOOD TEST: CPT

## 2018-01-01 PROCEDURE — 85610 PROTHROMBIN TIME: CPT | Performed by: INTERNAL MEDICINE

## 2018-01-01 PROCEDURE — 80048 BASIC METABOLIC PNL TOTAL CA: CPT | Performed by: INTERNAL MEDICINE

## 2018-01-01 PROCEDURE — 81001 URINALYSIS AUTO W/SCOPE: CPT | Performed by: EMERGENCY MEDICINE

## 2018-01-01 PROCEDURE — 65660000000 HC RM CCU STEPDOWN

## 2018-01-01 PROCEDURE — 71045 X-RAY EXAM CHEST 1 VIEW: CPT

## 2018-01-01 PROCEDURE — 85025 COMPLETE CBC W/AUTO DIFF WBC: CPT | Performed by: EMERGENCY MEDICINE

## 2018-01-01 PROCEDURE — 83036 HEMOGLOBIN GLYCOSYLATED A1C: CPT | Performed by: HOSPITALIST

## 2018-01-01 PROCEDURE — 93306 TTE W/DOPPLER COMPLETE: CPT

## 2018-01-01 PROCEDURE — 84300 ASSAY OF URINE SODIUM: CPT | Performed by: INTERNAL MEDICINE

## 2018-01-01 PROCEDURE — 76770 US EXAM ABDO BACK WALL COMP: CPT

## 2018-01-01 PROCEDURE — 87205 SMEAR GRAM STAIN: CPT | Performed by: INTERNAL MEDICINE

## 2018-01-01 PROCEDURE — 85025 COMPLETE CBC W/AUTO DIFF WBC: CPT | Performed by: INTERNAL MEDICINE

## 2018-01-01 PROCEDURE — 65270000029 HC RM PRIVATE

## 2018-01-01 PROCEDURE — 87086 URINE CULTURE/COLONY COUNT: CPT | Performed by: HOSPITALIST

## 2018-01-01 PROCEDURE — 85730 THROMBOPLASTIN TIME PARTIAL: CPT | Performed by: EMERGENCY MEDICINE

## 2018-01-01 PROCEDURE — 99285 EMERGENCY DEPT VISIT HI MDM: CPT

## 2018-01-01 PROCEDURE — 77030020887 HC CRM SKN PROT DIMTH S&N -A

## 2018-01-01 PROCEDURE — 77030011943

## 2018-01-01 PROCEDURE — 82803 BLOOD GASES ANY COMBINATION: CPT

## 2018-01-01 PROCEDURE — 87040 BLOOD CULTURE FOR BACTERIA: CPT | Performed by: HOSPITALIST

## 2018-01-01 PROCEDURE — 85730 THROMBOPLASTIN TIME PARTIAL: CPT | Performed by: INTERNAL MEDICINE

## 2018-01-01 PROCEDURE — 76450000000

## 2018-01-01 PROCEDURE — 81001 URINALYSIS AUTO W/SCOPE: CPT | Performed by: INTERNAL MEDICINE

## 2018-01-01 PROCEDURE — 36600 WITHDRAWAL OF ARTERIAL BLOOD: CPT

## 2018-01-01 PROCEDURE — 82553 CREATINE MB FRACTION: CPT | Performed by: EMERGENCY MEDICINE

## 2018-01-01 PROCEDURE — 82140 ASSAY OF AMMONIA: CPT | Performed by: EMERGENCY MEDICINE

## 2018-01-01 PROCEDURE — 80053 COMPREHEN METABOLIC PANEL: CPT | Performed by: EMERGENCY MEDICINE

## 2018-01-01 PROCEDURE — 92610 EVALUATE SWALLOWING FUNCTION: CPT

## 2018-01-01 PROCEDURE — 82570 ASSAY OF URINE CREATININE: CPT | Performed by: INTERNAL MEDICINE

## 2018-01-01 RX ORDER — HEPARIN SODIUM 10000 [USP'U]/100ML
18-36 INJECTION, SOLUTION INTRAVENOUS
Status: DISCONTINUED | OUTPATIENT
Start: 2018-01-01 | End: 2018-01-01

## 2018-01-01 RX ORDER — VANCOMYCIN/0.9 % SOD CHLORIDE 1.5G/250ML
1500 PLASTIC BAG, INJECTION (ML) INTRAVENOUS ONCE
Status: COMPLETED | OUTPATIENT
Start: 2018-01-01 | End: 2018-01-01

## 2018-01-01 RX ORDER — INSULIN LISPRO 100 [IU]/ML
INJECTION, SOLUTION INTRAVENOUS; SUBCUTANEOUS
Status: DISCONTINUED | OUTPATIENT
Start: 2018-01-01 | End: 2018-01-01

## 2018-01-01 RX ORDER — VANCOMYCIN/0.9 % SOD CHLORIDE 1.5G/250ML
1500 PLASTIC BAG, INJECTION (ML) INTRAVENOUS EVERY 24 HOURS
Status: DISCONTINUED | OUTPATIENT
Start: 2018-01-01 | End: 2018-01-01

## 2018-01-01 RX ORDER — HEPARIN SODIUM 1000 [USP'U]/ML
60 INJECTION, SOLUTION INTRAVENOUS; SUBCUTANEOUS ONCE
Status: COMPLETED | OUTPATIENT
Start: 2018-01-01 | End: 2018-01-01

## 2018-01-01 RX ORDER — PRAVASTATIN SODIUM 20 MG/1
40 TABLET ORAL
Status: DISCONTINUED | OUTPATIENT
Start: 2018-01-01 | End: 2018-01-01

## 2018-01-01 RX ORDER — ACETAMINOPHEN 325 MG/1
650 TABLET ORAL
Status: DISCONTINUED | OUTPATIENT
Start: 2018-01-01 | End: 2018-01-01 | Stop reason: HOSPADM

## 2018-01-01 RX ORDER — TAMSULOSIN HYDROCHLORIDE 0.4 MG/1
0.4 CAPSULE ORAL DAILY
COMMUNITY

## 2018-01-01 RX ORDER — FUROSEMIDE 10 MG/ML
INJECTION INTRAMUSCULAR; INTRAVENOUS
Status: DISPENSED
Start: 2018-01-01 | End: 2018-01-01

## 2018-01-01 RX ORDER — GABAPENTIN 300 MG/1
600 CAPSULE ORAL 3 TIMES DAILY
Status: DISCONTINUED | OUTPATIENT
Start: 2018-01-01 | End: 2018-01-01

## 2018-01-01 RX ORDER — TAMSULOSIN HYDROCHLORIDE 0.4 MG/1
0.4 CAPSULE ORAL DAILY
Status: DISCONTINUED | OUTPATIENT
Start: 2018-01-01 | End: 2018-01-01

## 2018-01-01 RX ORDER — WARFARIN 7.5 MG/1
7.5 TABLET ORAL DAILY
Status: CANCELLED | OUTPATIENT
Start: 2018-01-01

## 2018-01-01 RX ORDER — SPIRONOLACTONE 25 MG/1
25 TABLET ORAL 2 TIMES DAILY
COMMUNITY

## 2018-01-01 RX ORDER — BUMETANIDE 2 MG/1
2 TABLET ORAL 2 TIMES DAILY
COMMUNITY

## 2018-01-01 RX ORDER — LANOLIN ALCOHOL/MO/W.PET/CERES
325 CREAM (GRAM) TOPICAL
COMMUNITY

## 2018-01-01 RX ORDER — SODIUM CHLORIDE 9 MG/ML
75 INJECTION, SOLUTION INTRAVENOUS CONTINUOUS
Status: DISCONTINUED | OUTPATIENT
Start: 2018-01-01 | End: 2018-01-01

## 2018-01-01 RX ORDER — LORAZEPAM 1 MG/1
1 TABLET ORAL 2 TIMES DAILY
Status: DISCONTINUED | OUTPATIENT
Start: 2018-01-01 | End: 2018-01-01

## 2018-01-01 RX ORDER — CARVEDILOL 3.12 MG/1
3.12 TABLET ORAL 2 TIMES DAILY WITH MEALS
Status: DISCONTINUED | OUTPATIENT
Start: 2018-01-01 | End: 2018-01-01

## 2018-01-01 RX ORDER — ACETAMINOPHEN 650 MG/1
650 SUPPOSITORY RECTAL ONCE
Status: COMPLETED | OUTPATIENT
Start: 2018-01-01 | End: 2018-01-01

## 2018-01-01 RX ORDER — PHENYTOIN SODIUM 100 MG/1
100 CAPSULE, EXTENDED RELEASE ORAL EVERY 8 HOURS
Status: DISCONTINUED | OUTPATIENT
Start: 2018-01-01 | End: 2018-01-01

## 2018-01-01 RX ORDER — MAGNESIUM SULFATE 100 %
4 CRYSTALS MISCELLANEOUS AS NEEDED
Status: DISCONTINUED | OUTPATIENT
Start: 2018-01-01 | End: 2018-01-01 | Stop reason: HOSPADM

## 2018-01-01 RX ORDER — ACETAMINOPHEN 650 MG/1
SUPPOSITORY RECTAL
Status: DISPENSED
Start: 2018-01-01 | End: 2018-01-01

## 2018-01-01 RX ORDER — ACETAMINOPHEN 650 MG/1
650 SUPPOSITORY RECTAL
Status: DISCONTINUED | OUTPATIENT
Start: 2018-01-01 | End: 2018-01-01 | Stop reason: HOSPADM

## 2018-01-01 RX ORDER — SPIRONOLACTONE 25 MG/1
25 TABLET ORAL 2 TIMES DAILY
Status: CANCELLED | OUTPATIENT
Start: 2018-01-01

## 2018-01-01 RX ORDER — LORATADINE 10 MG/1
10 TABLET ORAL DAILY
Status: DISCONTINUED | OUTPATIENT
Start: 2018-01-01 | End: 2018-01-01

## 2018-01-01 RX ORDER — DEXTROSE 50 % IN WATER (D50W) INTRAVENOUS SYRINGE
25-50 AS NEEDED
Status: DISCONTINUED | OUTPATIENT
Start: 2018-01-01 | End: 2018-01-01 | Stop reason: HOSPADM

## 2018-01-01 RX ORDER — TRAMADOL HYDROCHLORIDE 50 MG/1
50 TABLET ORAL
Status: DISCONTINUED | OUTPATIENT
Start: 2018-01-01 | End: 2018-01-01

## 2018-01-01 RX ORDER — LORAZEPAM 2 MG/ML
0.5 INJECTION INTRAMUSCULAR
Status: DISCONTINUED | OUTPATIENT
Start: 2018-01-01 | End: 2018-01-01

## 2018-01-01 RX ORDER — LORAZEPAM 2 MG/ML
2 INJECTION INTRAMUSCULAR
Status: DISCONTINUED | OUTPATIENT
Start: 2018-01-01 | End: 2018-01-01 | Stop reason: HOSPADM

## 2018-01-01 RX ORDER — FUROSEMIDE 10 MG/ML
40 INJECTION INTRAMUSCULAR; INTRAVENOUS ONCE
Status: COMPLETED | OUTPATIENT
Start: 2018-01-01 | End: 2018-01-01

## 2018-01-01 RX ORDER — MORPHINE SULFATE 20 MG/ML
10 SOLUTION ORAL
Status: DISCONTINUED | OUTPATIENT
Start: 2018-01-01 | End: 2018-01-01 | Stop reason: HOSPADM

## 2018-01-01 RX ORDER — LANOLIN ALCOHOL/MO/W.PET/CERES
325 CREAM (GRAM) TOPICAL
Status: DISCONTINUED | OUTPATIENT
Start: 2018-01-01 | End: 2018-01-01

## 2018-01-01 RX ORDER — ASPIRIN 81 MG/1
81 TABLET ORAL DAILY
Status: DISCONTINUED | OUTPATIENT
Start: 2018-01-01 | End: 2018-01-01

## 2018-01-01 RX ORDER — ATORVASTATIN CALCIUM 20 MG/1
40 TABLET, FILM COATED ORAL DAILY
Status: DISCONTINUED | OUTPATIENT
Start: 2018-01-01 | End: 2018-01-01

## 2018-01-01 RX ORDER — LEVOFLOXACIN 5 MG/ML
750 INJECTION, SOLUTION INTRAVENOUS
Status: DISCONTINUED | OUTPATIENT
Start: 2018-01-01 | End: 2018-01-01

## 2018-01-01 RX ADMIN — CEFAZOLIN SODIUM 1 G: 1 INJECTION, POWDER, FOR SOLUTION INTRAMUSCULAR; INTRAVENOUS at 22:23

## 2018-01-01 RX ADMIN — CEFAZOLIN SODIUM 1 G: 1 INJECTION, POWDER, FOR SOLUTION INTRAMUSCULAR; INTRAVENOUS at 15:27

## 2018-01-01 RX ADMIN — PRAVASTATIN SODIUM 40 MG: 20 TABLET ORAL at 21:06

## 2018-01-01 RX ADMIN — LORAZEPAM 1 MG: 1 TABLET ORAL at 22:00

## 2018-01-01 RX ADMIN — MORPHINE SULFATE 10 MG: 20 SOLUTION ORAL at 23:49

## 2018-01-01 RX ADMIN — INSULIN LISPRO 2 UNITS: 100 INJECTION, SOLUTION INTRAVENOUS; SUBCUTANEOUS at 16:30

## 2018-01-01 RX ADMIN — LORAZEPAM 1 MG: 1 TABLET ORAL at 10:03

## 2018-01-01 RX ADMIN — INSULIN LISPRO 2 UNITS: 100 INJECTION, SOLUTION INTRAVENOUS; SUBCUTANEOUS at 22:00

## 2018-01-01 RX ADMIN — SODIUM CHLORIDE 250 ML: 9 INJECTION, SOLUTION INTRAVENOUS at 06:00

## 2018-01-01 RX ADMIN — VANCOMYCIN HYDROCHLORIDE 1500 MG: 10 INJECTION, POWDER, LYOPHILIZED, FOR SOLUTION INTRAVENOUS at 21:22

## 2018-01-01 RX ADMIN — PHENYTOIN SODIUM 100 MG: 100 CAPSULE ORAL at 17:19

## 2018-01-01 RX ADMIN — MORPHINE SULFATE 10 MG: 20 SOLUTION ORAL at 13:18

## 2018-01-01 RX ADMIN — SODIUM CHLORIDE 500 ML: 900 INJECTION, SOLUTION INTRAVENOUS at 06:00

## 2018-01-01 RX ADMIN — INSULIN LISPRO 2 UNITS: 100 INJECTION, SOLUTION INTRAVENOUS; SUBCUTANEOUS at 22:23

## 2018-01-01 RX ADMIN — CARVEDILOL 3.12 MG: 3.12 TABLET, FILM COATED ORAL at 18:04

## 2018-01-01 RX ADMIN — PHENYTOIN SODIUM 100 MG: 100 CAPSULE ORAL at 10:03

## 2018-01-01 RX ADMIN — ATORVASTATIN CALCIUM 40 MG: 20 TABLET, FILM COATED ORAL at 09:42

## 2018-01-01 RX ADMIN — ACETAMINOPHEN 650 MG: 650 SUPPOSITORY RECTAL at 17:24

## 2018-01-01 RX ADMIN — LEVOFLOXACIN 750 MG: 5 INJECTION, SOLUTION INTRAVENOUS at 18:30

## 2018-01-01 RX ADMIN — CARVEDILOL 3.12 MG: 3.12 TABLET, FILM COATED ORAL at 09:41

## 2018-01-01 RX ADMIN — PHENYTOIN SODIUM 100 MG: 100 CAPSULE ORAL at 18:37

## 2018-01-01 RX ADMIN — CEFAZOLIN SODIUM 1 G: 1 INJECTION, POWDER, FOR SOLUTION INTRAMUSCULAR; INTRAVENOUS at 07:02

## 2018-01-01 RX ADMIN — MORPHINE SULFATE 10 MG: 20 SOLUTION ORAL at 10:12

## 2018-01-01 RX ADMIN — GABAPENTIN 600 MG: 300 CAPSULE ORAL at 21:06

## 2018-01-01 RX ADMIN — VANCOMYCIN HYDROCHLORIDE 1500 MG: 10 INJECTION, POWDER, LYOPHILIZED, FOR SOLUTION INTRAVENOUS at 22:23

## 2018-01-01 RX ADMIN — ACETAMINOPHEN 650 MG: 650 SUPPOSITORY RECTAL at 18:29

## 2018-01-01 RX ADMIN — LORAZEPAM 1 MG: 1 TABLET ORAL at 17:19

## 2018-01-01 RX ADMIN — MORPHINE SULFATE 10 MG: 20 SOLUTION ORAL at 20:01

## 2018-01-01 RX ADMIN — HEPARIN SODIUM 18 UNITS/KG/HR: 10000 INJECTION, SOLUTION INTRAVENOUS at 06:17

## 2018-01-01 RX ADMIN — HEPARIN SODIUM 7100 UNITS: 1000 INJECTION, SOLUTION INTRAVENOUS; SUBCUTANEOUS at 06:16

## 2018-01-01 RX ADMIN — FERROUS SULFATE TAB 325 MG (65 MG ELEMENTAL FE) 325 MG: 325 (65 FE) TAB at 07:35

## 2018-01-01 RX ADMIN — LORAZEPAM 2 MG: 2 INJECTION INTRAMUSCULAR; INTRAVENOUS at 13:22

## 2018-01-01 RX ADMIN — FUROSEMIDE 40 MG: 10 INJECTION, SOLUTION INTRAVENOUS at 17:23

## 2018-01-01 RX ADMIN — HEPARIN SODIUM 12 UNITS/KG/HR: 10000 INJECTION, SOLUTION INTRAVENOUS at 07:21

## 2018-01-01 RX ADMIN — CEFAZOLIN SODIUM 1 G: 1 INJECTION, POWDER, FOR SOLUTION INTRAMUSCULAR; INTRAVENOUS at 07:35

## 2018-01-01 RX ADMIN — ASPIRIN 81 MG: 81 TABLET, COATED ORAL at 09:42

## 2018-01-01 RX ADMIN — MORPHINE SULFATE 10 MG: 20 SOLUTION ORAL at 18:04

## 2018-01-01 RX ADMIN — SODIUM CHLORIDE 500 ML: 900 INJECTION, SOLUTION INTRAVENOUS at 10:19

## 2018-01-01 RX ADMIN — MORPHINE SULFATE 10 MG: 20 SOLUTION ORAL at 04:30

## 2018-01-01 RX ADMIN — LORATADINE 10 MG: 10 TABLET ORAL at 09:42

## 2018-01-01 RX ADMIN — LORAZEPAM 2 MG: 2 INJECTION INTRAMUSCULAR; INTRAVENOUS at 04:30

## 2018-01-01 RX ADMIN — MORPHINE SULFATE 10 MG: 20 SOLUTION ORAL at 06:23

## 2018-01-01 RX ADMIN — LORAZEPAM 2 MG: 2 INJECTION INTRAMUSCULAR; INTRAVENOUS at 23:56

## 2018-01-01 RX ADMIN — LORAZEPAM 0.5 MG: 2 INJECTION INTRAMUSCULAR; INTRAVENOUS at 21:06

## 2018-01-01 RX ADMIN — INSULIN LISPRO 4 UNITS: 100 INJECTION, SOLUTION INTRAVENOUS; SUBCUTANEOUS at 12:30

## 2018-01-01 RX ADMIN — CEFAZOLIN SODIUM 1 G: 1 INJECTION, POWDER, FOR SOLUTION INTRAMUSCULAR; INTRAVENOUS at 18:30

## 2018-01-01 RX ADMIN — SODIUM CHLORIDE 75 ML/HR: 900 INJECTION, SOLUTION INTRAVENOUS at 08:42

## 2018-01-01 RX ADMIN — MORPHINE SULFATE 10 MG: 20 SOLUTION ORAL at 23:13

## 2018-01-01 RX ADMIN — LORAZEPAM 0.5 MG: 2 INJECTION INTRAMUSCULAR; INTRAVENOUS at 01:58

## 2018-05-08 PROBLEM — R77.8 TROPONIN LEVEL ELEVATED: Status: ACTIVE | Noted: 2018-01-01

## 2018-05-08 PROBLEM — R06.00 DYSPNEA: Status: ACTIVE | Noted: 2018-01-01

## 2018-05-08 PROBLEM — I27.20 PULMONARY HYPERTENSION (HCC): Status: ACTIVE | Noted: 2018-01-01

## 2018-05-08 PROBLEM — N19 UREMIA: Status: ACTIVE | Noted: 2018-01-01

## 2018-05-08 PROBLEM — I50.9 CHF (CONGESTIVE HEART FAILURE) (HCC): Status: ACTIVE | Noted: 2018-01-01

## 2018-05-08 PROBLEM — N17.9 ACUTE KIDNEY INJURY (HCC): Status: ACTIVE | Noted: 2018-01-01

## 2018-05-08 PROBLEM — N17.9 AKI (ACUTE KIDNEY INJURY) (HCC): Status: ACTIVE | Noted: 2018-01-01

## 2018-05-08 NOTE — PROGRESS NOTES
0700: Received pt from ED, pt was placed on tele box 9, vitals taken, pt made comfortable. Pt denies pain at this time, but dyspnea on exertion noted. Wife at the bedside. Day shift nurse Kody CASEY was given report on pt and white board updated.

## 2018-05-08 NOTE — PROGRESS NOTES
Patient seen and examined during IDRs  Admitted by Dr. Herbert Quintana for MINERVA,   Seen by nephrology  Cardiology to see.

## 2018-05-08 NOTE — PROGRESS NOTES
Responded to RRT, ABG's done on oxygen at 3 liters. RR 28-30. Nurse placed patient on non-rebreathing mask.

## 2018-05-08 NOTE — CONSULTS
RENAL CONSULT  2018    Patient:  Carter Ding  :  1950  Gender:  male  MRN #:  [id number]    Consulting Physician:  Ana Bermeo DO,  Assessment:    )Principal Problem:    MINERVA (acute kidney injury) ([address] (2018)    Active Problems:    CHF (congestive heart failure) ([address] (2018)      Uremia (2018)      Dyspnea (2018)      Pulmonary hypertension ([address] (2018)      Troponin level elevated (2018)      Acute kidney injury ([address] (2018)      Hyponatremia, Hypervolemic  Aortic stenosis  Neurontin accumulation leading to toxicity  Biventricular heart failure  Pulmonary HTN    Anemia   Leukocytosis      Plan:    Most likely this is all prerenal  Gentle hydration till AM  Stop Neurontin,   Urine lytes and US pending  NO urgent need for HD    History of Present Illness:  Carter Ding is a 80y.o. year old male with ongoing biventricular heart failure, Pulmonary HTN, who came in to ED for leg weakness. Here it was found that he is ARF with above findings. I was asked to see. PT is awake this morning and is about to eat breakfast.   I have reviewed [facility] records at [facility] and [facility]         Past Medical History:   Diagnosis Date    Aortic stenosis 2017    Bilateral artificial lens implant ,     Chronic anticoagulation 2017    Chronic atrial fibrillation ([address] 2017    Coronal hypospadias 2017    Coronary artery disease involving native coronary artery of native heart without angina pectoris 2017    Diabetes mellitus with no complication ([address] 5775    Last Assessment & Plan:  No apparent diabetic retinopathy. . Pt told to keep regular primary care and eye appointments to reduce the risk of visual loss from diabetic eye complications, and to follow up immediately for changes in vision.       Diabetic retinopathy associated with type 2 diabetes mellitus ([address] 2017    Fluid overload     Gout 2017    Lesion of soft palate 7/29/2017    ENT planned biopsy    Pseudophakia of both eyes 9/29/2017    Last Assessment & Plan:  Vision stable. IOL's well centered. Pt pleased with visual result post-op OD 1999 and OS 1997. To call if any visual changes.  Retinal hole or tear 6/12/2017    Last Assessment & Plan:  [facility] well surrounded. Stable. Will follow    Tricuspid regurgitation 9/29/2017     Past Surgical History:   Procedure Laterality Date    HX CATARACT REMOVAL Bilateral 1997, 1999    b/l lens implant    HX CHOLECYSTECTOMY      HX KNEE REPLACEMENT      HX SHOULDER REPLACEMENT       Family History   Problem Relation Age of Onset    Family history unknown: Yes     No Known Allergies  Current Facility-Administered Medications   Medication Dose Route Frequency Provider Last Rate Last Dose    heparin 25,000 units in D5W 250 ml infusion  18-36 Units/kg/hr IntraVENous TITRATE Erma Nance MD 21.3 mL/hr at 05/08/18 0730 18 Units/kg/hr at 05/08/18 0730    0.9% sodium chloride infusion  75 mL/hr IntraVENous CONTINUOUS Romell Heimlich, MD 75 mL/hr at 05/08/18 0842 75 mL/hr at 05/08/18 0842    acetaminophen (TYLENOL) tablet 650 mg  650 mg Oral Q4H PRN Romell Heimlich, MD           Review of Symptoms:  Below symptoms negative if not in [place].   Consitutional Symptoms: Fever, weight loss, weight gain, fatigue  Eyes:  pain, sudden vision loss, blurry vision, double vision             bleeding nose, sore throat, hoarseness  GI: nausea, vomiting, diarrhea, pain, blood in stool  Pulmonary; Cough, Shortness of breath, Wheezing's  Cardiac: chest pain, palpitation  Musculoskeletal: difficulty walking, falls, pain over muscle, joint pain, weakness  : dysuria, blood in urine, pain with urination, difficulty voiding  Neurologia: dizziness, syncope, leg weakness, difficulty speaking  Integumentary: rash, redness, ulcer   Psychiatric:  Depression suicidal ideation    Objective:  Visit Vitals    /71 (BP 1 Location: Left arm, BP Patient Position: At rest)    Pulse 73    Temp 98.7 °F (37.1 °C)    Resp 22    Ht 5' 11\" (1.803 m)    Wt 118.4 kg (261 lb 1.6 oz)    SpO2 97%    BMI 36.42 kg/m2       Appear chronically ill, bearded no NAD  Eyes: anicteric, no subconjectival hemorrahge, eye lid without lesion  Ears, nose, mouth and throat without visible mass, scars, lesion  Neck: supple  Respiratory: good effort, no rales, good breath sounds, no wheezings  Cardiovascular:  S1 S2, systolic murmur throughout chest  GI: soft, obese  Musculoskeletal: No clubbing cynosis, no petechia  Skin: lower extremity hyperpigmentation and woody skin with plus 2 edema  Neruoligcal: myoclonus  Psychicatric:   Non anxiouse. Laboratory Data:  Lab Results   Component Value Date     (H) 05/08/2018    BUN 23 (H) 10/06/2017    BUN 23 (H) 10/05/2017     (L) 05/08/2018     10/06/2017     10/05/2017    CO2 28 05/08/2018    CO2 32 10/06/2017    CO2 32 10/05/2017     Lab Results   Component Value Date    WBC 26.4 (H) 05/08/2018    HGB 11.9 (L) 05/08/2018    HCT 35.7 (L) 05/08/2018     Imaging have been reviewed:  )Xr Chest Port    Result Date: 5/8/2018  PORTABLE CHEST AT 0234 HOURS: Indication:  Weakness. Technique:  Portable, erect AP view. Comparison:  09/29/2017. CT chest 09/30/2017. Findings:  Lungs are adequately expanded without focal consolidation, pulmonary edema or pneumothorax. Cardiac silhouette is moderately to markedly enlarged, stable. Tortuous thoracic aorta. Degenerative change around the visualized left shoulder. Right proximal humeral arthroplasty, incompletely imaged. IMPRESSION: No acute cardiopulmonary disease. Stable moderate to marked enlargement of the cardiac silhouette, cardiomegaly and/or pericardial effusion. 61 minutes of  time spent in the direct evaluation and treatment of this high risk patient.  The reason for providing this level of medical care for this critically ill patient was due a critical illness that impaired one or more vital organ systems such that there was a high probability of imminent or life threatening deterioration in the patients condition.  This care involved high complexity decision making to assess, manipulate, and support vital system functions, to treat this degreee vital organ system failure and to prevent further life threatening deterioration of the patients condition.       )Gary Sorto DO,

## 2018-05-08 NOTE — PROGRESS NOTES
0730 Bedside report received from WILD Young, 909 [facility] Critical lab [facility]. [facility] Keith Thompson aware of elevated troponin. [id number] Pt was transported to vascular for echo. 1440 Transported to ultrasound. 2400 [facility] lab result. 1530 Bedside and Verbal shift change report given to Ashley Soni RN (oncoming nurse) by Satish Ngo RN   (offgoing nurse). Report included the following information SBAR, Kardex, Procedure Summary, Intake/Output, MAR, Recent Results and Med Rec Status. 1 Spoke with Dr. Keith Thompson and he updated me on the plan of care. He states not to worry about troponin levels.

## 2018-05-08 NOTE — ED NOTES
Pt wanting something to drink. Discussed with Dr Mohini Dan. Ice water provided per the \"OK\" of Dr Mohini Dan. Have been informed that RT has been notified of ABG order.

## 2018-05-08 NOTE — ED TRIAGE NOTES
Presented to ED via EMS from home to be evaluated for reported increased weakness x 1 week. According to EMS spouse reports increased weakness and AMS. On triage patient is noted to be A&Ox3 with c/o pain to RLE. Patient denies any known injury. Patient placed on CCM with noted A-Fib with no c/o chest pain. Patient is also noted to be tachypneic with RR of 32 and O2 Sats of 96% on RA. Patient is noted to be resting to position of comfort at time of triage. Sepsis Screening completed    (  )Patient meets SIRS criteria. (x  )Patient does not meet SIRS criteria.       SIRS Criteria is achieved when two or more of the following are present   Temperature < 96.8°F (36°C) or > 100.9°F (38.3°C)   Heart Rate > 90 beats per minute   Respiratory Rate > 20 breaths per minute   WBC count > 12,000 or <4,000 or > 10% bands

## 2018-05-08 NOTE — PROGRESS NOTES
Hospitalist Progress Note    Patient: Maryann Marin MRN: [id number]  CSN: [id number]    YOB: 1950  Age: 80 y.o.   Sex: male    DOA: 5/8/2018 LOS:  LOS: 0 days          Called to RRT for Mr Juan Manuel Hays  He has been admitted for MINERVA, encephalopathy, extensive complicated recent hx with aortic valve replacement, CAD  He is morbidly obese and has what apears maybe to be uremia  He has been seen by hospitalist, nephrologist and cardiologist    IV antibitoics have been started for his fevers and I ordered IV lasix for tachypnea-sats are normal on NC 02-ABG was unremarkable-C02 is ok  Stat CX ordered, EKG confirms afib-known    He is on heparin for ACS-positive troponins    Exam:  /101  HR 94  sa02 99%  RR 20  ill appearing encephalopathic morbidly obese WM, warm  CVS irregularly, irregular, ziat 3/6 murmur  Lungs dec bs bases, clear anteriorly  abd obese nt  Le edema chronic  No edema in groin  Tremors of UE  Confused      Will continue with treatments ordered earlier, tylenol for fever, lasix X 1  Labs ordered    Supportive treatment    Discussed with wife on phone and I reviewed the complexity of his condition ad gravity of his severe ilness with increased mortality risk in 12-24 hrs  She understands and very clearly states that she does not want further escalation in his care-would not want ICU transfer tor further aggressive treatments beyond what we are doing-she recognizes his quality of life is poor and has been acutely declining lately and she understands he could die in the hospital from all of his illnesses  He has a DNR in place  I have reviewed the plan with nursing  If he declines further or has signs of distress, would medicate this to help with his comfort-as desired by him and wife    Prognosis exceptionally poor and again high risk for his mortality here in hospital      Hang Castro MD

## 2018-05-08 NOTE — PROGRESS NOTES
Reason for Admission: MINERVA               RRAT Score:  28                Resources/supports as identified by patient/family: TBD                  Top Challenges facing patient (as identified by patient/family and CM): TBD                     Finances/Medication cost?                    Transportation? Support system or lack thereof? Living arrangements? Self-care/ADLs/Cognition? Current Advanced Directive/Advance Care Plan:  DNR                          Plan for utilizing home health:  TBD                        Likelihood of readmission:                  Transition of Care Plan:      Chart reviewed pt came to ED  Due to increase weakness x1 week and AMS cm did attempt to visit with pt resting unable to hold conversation appropriate cm will attempt to revisit tomorrow if confused will call wife to discuss d/c planning.

## 2018-05-08 NOTE — ED NOTES
Have assumed care of pt. Pt arrived via EMS. Told in report that family concerned of pt having altered mental status. Pt is currently A/Ox 3, following commands, answering questions but may hesitate at times. Pt denying SOB- O2 sat 95 % RA. Pt has 2 L NC in place for comfort for pt using accessory muscles. Immediate O2 Sat  % RA. Pt has PIV access x 2. Blood for labs collected/taken to lab for processing. EKG completed by staff.

## 2018-05-08 NOTE — PROGRESS NOTES
Pharmacy Dosing Services: Vancomycin    Consult for Vancomycin Dosing by Pharmacy by Dr. Lucila Ayoub provided for this 80y.o. year old male , for indication of CAP. Day of Therapy 01    Ht Readings from Last 1 Encounters:   05/08/18 180.3 cm (71\")        Wt Readings from Last 1 Encounters:   05/08/18 118.4 kg (261 lb 1.6 oz)        Previous Regimen NA   Last Level NA   Other Current Antibiotics Levaquin 750 mg IV q48   Significant Cultures Pending   Serum Creatinine Lab Results   Component Value Date/Time    Creatinine 2.55 (H) 05/08/2018 02:30 AM      Creatinine Clearance Estimated Creatinine Clearance: 36.8 mL/min (based on Cr of 2.55). BUN Lab Results   Component Value Date/Time     (H) 05/08/2018 02:30 AM      WBC Lab Results   Component Value Date/Time    WBC 26.4 (H) 05/08/2018 08:06 AM      H/H Lab Results   Component Value Date/Time    HGB 11.9 (L) 05/08/2018 08:06 AM      Platelets Lab Results   Component Value Date/Time    PLATELET 628 78/60/9885 08:06 AM      Temp 98.2 °F (36.8 °C)     MINERVA (suspected prerenal) per Nephro. Will dose conservatively until renal improvement. Start Vancomycin therapy, with loading dose of Vancomycin 1500 mg IV x1 @ 17:00 [id number]. Will determine maintenance dosing based upon AM labs [id number]. Dose calculated to approximate a therapeutic trough of 15-20 mcg/mL. Pharmacy to follow daily and will make changes to dose and/or frequency based on clinical status. Chong Fernandez, Pharm. D.   Clinical Pharmacist  [phone]

## 2018-05-08 NOTE — PROGRESS NOTES
RRT    Arrived to room, Dr. Nafisa Cerna running RRT. Nursing Supervisor at bedside. RT at bedside, ABG done  VS= /120, SpO2 95%. Pt Afib. Lethargic, responds appropriately. Labs drawn. EKG done by ER tech. Possible transfer to ICU. Dr. Nafisa Cerna speaking with family. Plan is. After speaking with family, pt will be monitored in current location. Continue care as ordered. Per Dr. Nafisa Cerna.

## 2018-05-08 NOTE — ED NOTES
Just back from the floor from transporting pt to the floor with the assistance of [address]. Verified tele strip with Jada Vora RN, dual skin completed with Jada Vora RN. Heparin gtt rate verified- see MAR.

## 2018-05-08 NOTE — PROGRESS NOTES
1600 Assumed care of pt from Geronimo Sparks. Pt off unit for test Will assess when back on unit     1707 Returned to floor, with update VS, pt changed linens and brief saturated    1710 [facility] [place] 6, pt extremely lethargic, unsure of whether this is baseline for pt as he was off the floor during shift report. 1714 RRT called, pt extremely lethargic, unable to stay awake long enough to answer questions appropriate RR 30 on NC. Shift Summary- RRT called during short time with pt. Pt visibly uncomfortable and lethargic, states that \"he cannot breathe\" attempted to switch to NC after Morphine was given however pt did not tolerate well. Transferred back onto NRB and RT made aware. PO medications were not given d/t pt being lethargic.

## 2018-05-08 NOTE — ED PROVIDER NOTES
EMERGENCY DEPARTMENT HISTORY AND PHYSICAL EXAM    Date: 5/8/2018  Patient Name: Chance Hernandez    History of Presenting Illness     Chief Complaint   Patient presents with    Fatigue    Leg Pain         History Provided By: Patient and Patient's Wife    Chief Complaint: Fatigue  Duration: 1 week   Timing:  Worsening  Location: N/A  Associated Symptoms: SOB, left leg pain, generalized weakness, muscle twitches, diaphoresis, malodorous urine, and AMS    Additional History (Context):   3:43 AM  Chance Hernandez is a 80 y.o. male with PMHx of chronic A-fib, aortic stenosis, CAD, DM, and PAH and PSHx of TAVR procedure who presents via EMS to the emergency department C/O fatigue, onset 1 week. Associated sxs include SOB, left leg pain, generalized weakness, muscle twitches, diaphoresis, malodorous urine, and AMS. Wife notes that pt has episodes of muscle twitches and diaphoresis lasting a couple hours once a day and worsening SOB for the last 5 days. Wife reports similar presentation to O2 deprivation 6 years ago. Pt had a heart cath on September 2017 and found aortic stenosis and 3 blockages. Wife states that pt's condition has been declining since November 2017. Wife also reports recent hospitalization to [facility] a few months ago due to fluid overload. Pt is followed by pulmonologist Daniel Dill MD for [facility]. Pt is not on home O2. Pt denies CP, vomiting, fever, or any other sxs or complaints. Pt is DNR.     PCP: Obdulia Phillips MD    Current Facility-Administered Medications   Medication Dose Route Frequency Provider Last Rate Last Dose    heparin (porcine) 1,000 unit/mL injection 7,100 Units  60 Units/kg IntraVENous ONCE Erma Nance MD        heparin 25,000 units in D5W 250 ml infusion  18-36 Units/kg/hr IntraVENous TITRATE Erma Nance MD         Current Outpatient Prescriptions   Medication Sig Dispense Refill    spironolactone (ALDACTONE) 25 mg tablet Take 25 mg by mouth two (2) times a day.      bumetanide (BUMEX) 2 mg tablet Take 2 mg by mouth two (2) times a day.  ferrous sulfate 325 mg (65 mg iron) tablet Take 325 mg by mouth Daily (before breakfast).  tamsulosin (FLOMAX) 0.4 mg capsule Take 0.4 mg by mouth daily.  aspirin delayed-release 81 mg tablet Take 1 Tab by mouth daily. 30 Tab 1    carvedilol (COREG) 12.5 mg tablet Take 1 Tab by mouth two (2) times daily (with meals). (Patient taking differently: Take 3.125 mg by mouth two (2) times daily (with meals). ) 60 Tab 2    pravastatin (PRAVACHOL) 40 mg tablet Take 1 Tab by mouth nightly. 30 Tab 2    gabapentin (NEURONTIN) 600 mg tablet Take 600 mg by mouth three (3) times daily.  tiotropium (SPIRIVA) 18 mcg inhalation capsule Take 1 Cap by inhalation daily. 30 Cap 2    traMADol (ULTRAM) 50 mg tablet Take 1 Tab by mouth every six (6) hours as needed. Max Daily Amount: 200 mg. (Patient taking differently: Take 50 mg by mouth every eight (8) hours as needed.) 30 Tab 0    cetirizine (ZYRTEC) 10 mg tablet Take 10 mg by mouth daily as needed.  warfarin (COUMADIN) 5 mg tablet Take 7.5 mg by mouth daily. Every day except wednesday         Past History     Past Medical History:  Past Medical History:   Diagnosis Date    Aortic stenosis 9/29/2017    Bilateral artificial lens implant 1997, 1999    Chronic anticoagulation 9/29/2017    Chronic atrial fibrillation ([facility] [address] 9/29/2017    Coronal hypospadias 9/29/2017    Coronary artery disease involving native coronary artery of native heart without angina pectoris 9/28/2017    Diabetes mellitus with no complication ([address] 2/90/3348    Last Assessment & Plan:  No apparent diabetic retinopathy. . Pt told to keep regular primary care and eye appointments to reduce the risk of visual loss from diabetic eye complications, and to follow up immediately for changes in vision.       Diabetic retinopathy associated with type 2 diabetes mellitus ([address] 9/29/2017    Fluid overload     Gout 9/29/2017    Lesion of soft palate 7/29/2017    ENT planned biopsy    Pseudophakia of both eyes 9/29/2017    Last Assessment & Plan:  Vision stable. IOL's well centered. Pt pleased with visual result post-op OD 1999 and OS 1997. To call if any visual changes.  Retinal hole or tear 6/12/2017    Last Assessment & Plan:  [facility] well surrounded. Stable. Will follow    Tricuspid regurgitation 9/29/2017       Past Surgical History:  Past Surgical History:   Procedure Laterality Date    HX CATARACT REMOVAL Bilateral 1997, 1999    b/l lens implant    HX CHOLECYSTECTOMY      HX KNEE REPLACEMENT      HX SHOULDER REPLACEMENT         Family History:  Family History   Problem Relation Age of Onset    Family history unknown: Yes       Social History:  Social History   Substance Use Topics    Smoking status: Former Smoker    Smokeless tobacco: Never Used    Alcohol use Yes       Allergies:  No Known Allergies      Review of Systems   Review of Systems   Constitutional: Positive for diaphoresis. Negative for chills, fever and unexpected weight change. HENT: Negative for congestion, drooling, ear pain, rhinorrhea, sore throat, tinnitus and trouble swallowing. Eyes: Negative for photophobia, pain, redness and visual disturbance. Respiratory: Positive for shortness of breath. Negative for cough, choking, chest tightness, wheezing and stridor. Cardiovascular: Negative for chest pain, palpitations and leg swelling. Gastrointestinal: Negative for abdominal distention, abdominal pain, anal bleeding, blood in stool, constipation, diarrhea, nausea and vomiting. Genitourinary: Negative for difficulty urinating, dysuria, flank pain, frequency, hematuria and urgency. (+) malodorous urine   Musculoskeletal: Positive for myalgias (left leg). Negative for arthralgias, back pain and neck pain. Skin: Negative for color change, rash and wound. Neurological: Positive for weakness.  Negative for dizziness, tremors, seizures, syncope, speech difficulty, light-headedness and headaches. (+) muscle twitches   Hematological: Does not bruise/bleed easily. Psychiatric/Behavioral: Negative for agitation, behavioral problems, hallucinations, self-injury and suicidal ideas. The patient is not hyperactive. Physical Exam     Vitals:    05/08/18 0506 05/08/18 0515 05/08/18 0516 05/08/18 0532   BP:  109/66 109/66    Pulse:  87 79 88   Resp:  19 22    Temp:   98.8 °F (37.1 °C)    SpO2: 98% 96% 95% 97%   Weight:       Height:         Physical Exam   Constitutional: He is oriented to person, place, and time. He appears distressed. He is not intubated. Morbid obesity, using chest and abdominal accessory muscles   HENT:   Head: Normocephalic and atraumatic. Right Ear: External ear normal.   Left Ear: External ear normal.   Mouth/Throat: Mucous membranes are dry. No oropharyngeal exudate. Dry mucous membranes, mildly cyanotic lips and tongue   Eyes: Conjunctivae and EOM are normal. Pupils are equal, round, and reactive to light. No scleral icterus. No pallor   Neck: Normal range of motion. Neck supple. No JVD present. No tracheal deviation present. No thyromegaly present. Cardiovascular: Normal rate and regular rhythm. Exam reveals gallop, S3 and distant heart sounds. Exam reveals no S4 and no friction rub. Murmur heard. Decrescendo systolic murmur is present with a grade of 3/6   Pulmonary/Chest: Accessory muscle usage present. No stridor. Tachypnea noted. No apnea and no bradypnea. He is not intubated. No respiratory distress. He has rales in the right lower field and the left lower field. He exhibits no bony tenderness and no edema. No rales to ant chest wall, but audible to both bases posteriorly   Abdominal: Soft. Bowel sounds are normal. He exhibits no distension. There is no tenderness. There is no rebound and no guarding. Musculoskeletal: Normal range of motion.  He exhibits no edema or tenderness. No soft tissue injuries   Lymphadenopathy:     He has no cervical adenopathy. Neurological: He is alert and oriented to person, place, and time. He has normal reflexes. No cranial nerve deficit. Coordination normal.   Skin: Skin is warm and dry. No rash noted. He is not diaphoretic. No erythema. Psychiatric: He has a normal mood and affect. His behavior is normal. Judgment and thought content normal.   Nursing note and vitals reviewed. Diagnostic Study Results     Labs -     Recent Results (from the past 12 hour(s))   METABOLIC PANEL, COMPREHENSIVE    Collection Time: 05/08/18  2:30 AM   Result Value Ref Range    Sodium 133 (L) 136 - 145 mmol/L    Potassium 5.0 3.5 - 5.5 mmol/L    Chloride 94 (L) 100 - 108 mmol/L    CO2 28 21 - 32 mmol/L    Anion gap 11 3.0 - 18 mmol/L    Glucose 145 (H) 74 - 99 mg/dL     (H) 7.0 - 18 MG/DL    Creatinine 2.55 (H) 0.6 - 1.3 MG/DL    BUN/Creatinine ratio 43 (H) 12 - 20      GFR est AA 31 (L) >60 ml/min/1.73m2    GFR est non-AA 25 (L) >60 ml/min/1.73m2    Calcium 9.2 8.5 - 10.1 MG/DL    Bilirubin, total 2.0 (H) 0.2 - 1.0 MG/DL    ALT (SGPT) 38 16 - 61 U/L    AST (SGOT) 53 (H) 15 - 37 U/L    Alk. phosphatase 102 45 - 117 U/L    Protein, total 6.7 6.4 - 8.2 g/dL    Albumin 2.6 (L) 3.4 - 5.0 g/dL    Globulin 4.1 (H) 2.0 - 4.0 g/dL    A-G Ratio 0.6 (L) 0.8 - 1.7     CBC WITH AUTOMATED DIFF    Collection Time: 05/08/18  2:30 AM   Result Value Ref Range    WBC 16.2 (H) 4.6 - 13.2 K/uL    RBC 4.36 (L) 4.70 - 5.50 M/uL    HGB 13.3 13.0 - 16.0 g/dL    HCT 39.7 36.0 - 48.0 %    MCV 91.1 74.0 - 97.0 FL    MCH 30.5 24.0 - 34.0 PG    MCHC 33.5 31.0 - 37.0 g/dL    RDW 15.2 (H) 11.6 - 14.5 %    PLATELET 321 553 - 221 K/uL    MPV 10.5 9.2 - 11.8 FL    NEUTROPHILS 93 (H) 40 - 73 %    LYMPHOCYTES 4 (L) 21 - 52 %    MONOCYTES 3 3 - 10 %    EOSINOPHILS 0 0 - 5 %    BASOPHILS 0 0 - 2 %    ABS. NEUTROPHILS 15.0 (H) 1.8 - 8.0 K/UL    ABS.  LYMPHOCYTES 0.7 (L) 0.9 - 3.6 K/UL ABS. MONOCYTES 0.5 0.05 - 1.2 K/UL    ABS. EOSINOPHILS 0.0 0.0 - 0.4 K/UL    ABS. BASOPHILS 0.0 0.0 - 0.06 K/UL    DF AUTOMATED     CARDIAC PANEL,(CK, CKMB & TROPONIN)    Collection Time: 05/08/18  2:30 AM   Result Value Ref Range    CK 39 39 - 308 U/L    CK - MB 4.1 (H) <3.6 ng/ml    CK-MB Index 10.5 (H) 0.0 - 4.0 %    Troponin-I, Qt. 0.88 (H) 0.00 - 0.06 NG/ML   AMMONIA    Collection Time: 05/08/18  2:30 AM   Result Value Ref Range    Ammonia 26 11 - 32 UMOL/L   URINALYSIS W/ RFLX MICROSCOPIC    Collection Time: 05/08/18  3:54 AM   Result Value Ref Range    Color YELLOW      Appearance CLEAR      Specific gravity 1.015 1.005 - 1.030      pH (UA) 5.0 5.0 - 8.0      Protein NEGATIVE  NEG mg/dL    Glucose NEGATIVE  NEG mg/dL    Ketone NEGATIVE  NEG mg/dL    Bilirubin NEGATIVE  NEG      Blood NEGATIVE  NEG      Urobilinogen 1.0 0.2 - 1.0 EU/dL    Nitrites NEGATIVE  NEG      Leukocyte Esterase TRACE (A) NEG     URINE MICROSCOPIC ONLY    Collection Time: 05/08/18  3:54 AM   Result Value Ref Range    WBC 3 to 5 0 - 5 /hpf    RBC NEGATIVE  0 - 5 /hpf    Epithelial cells 1+ 0 - 5 /lpf    Bacteria NEGATIVE  NEG /hpf    Mucus 1+ (A) NEG /lpf    Hyaline cast 1 to 3 0 - 2 /lpf   POC G3    Collection Time: 05/08/18  4:39 AM   Result Value Ref Range    Device: NASAL CANNULA      Flow rate (POC) 2 L/M    FIO2 (POC) 0.28 %    pH (POC) 7.493 (H) 7.35 - 7.45      pCO2 (POC) 32.3 (L) 35.0 - 45.0 MMHG    pO2 (POC) 89 80 - 100 MMHG    HCO3 (POC) 24.8 22 - 26 MMOL/L    sO2 (POC) 98 (H) 92 - 97 %    Base excess (POC) 2 mmol/L    Allens test (POC) N/A      Total resp. rate 23      Site RIGHT RADIAL      Patient temp.  98.9      Specimen type (POC) ARTERIAL      Performed by Zbigniew Hawkins        Radiologic Studies -    XR CHEST PORT    (Results Pending)     4:14 AM  RADIOLOGY FINDINGS  Chest X-ray shows severe cardiomegaly, subtle edema changes  Pending review by Radiologist  Recorded by Danish Degroot, ED Scribe, as dictated by El. Maryjo Melendez MD    CT Results  (Last 48 hours)    None        CXR Results  (Last 48 hours)    None            Medical Decision Making   I am the first provider for this patient. I reviewed the vital signs, available nursing notes, past medical history, past surgical history, family history and social history. Vital Signs-Reviewed the patient's vital signs. Pulse Oximetry Analysis - 95% on NC     Cardiac Monitor:  Rate: 80 bpm  Rhythm: NSR    EKG interpretation: (Preliminary)  2:20 AM  NSR at 93 bpm. LBBB. EKG read by Lilian Peoples MD at 2:20 AM     Records Reviewed: Nursing Notes    Provider Notes (Medical Decision Making):   Ddx: Insidious worsening of respiratory status exam shows obese male using accessory muscles. Lungs without wheezing but crackles inferiorly when sitting upright. Heart with strong ejection murmur and S3 gallop. His findings suggests both heart failure and aortic stenosis, these could worsen pulmonary situation and still trying to ascertain the source of his pulmonary arterial hypertension as more likely secondary than primary. He should be good candidate for BiPAP. Will check blood gasses before starting tx. MINERVA might mitigate use of fluids for his CHF. Will review records and tx following ABG and perhaps ICU consultation. Procedures:  Procedures    MEDICATIONS GIVEN:  Medications   heparin (porcine) 1,000 unit/mL injection 7,100 Units (not administered)   heparin 25,000 units in D5W 250 ml infusion (not administered)       ED Course:   3:42 AM   Initial assessment performed. The patients presenting problems have been discussed, and they are in agreement with the care plan formulated and outlined with them. I have encouraged them to ask questions as they arise throughout their visit. 5:46 AM Discussed patient's history, exam, and available diagnostics results with Moiz Daily MD, internal medicine, who agrees that pt has unrecognized PE in addition to renal disease. Agrees to admit to telemetry. 7:30 AM Discussed patient's history, exam, and available diagnostics results with Adriel Klein DO, nephrology, who agree with management and will consult the patient. Diagnosis and Disposition     Critical Care Time: 90 mins  6:03 AM  I have spent 90 minutes of critical care time involved in lab review, consultations with specialist, family decision-making, and documentation. During this entire length of time I was immediately available to the patient. Critical Care: The reason for providing this level of medical care for this critically ill patient was due a critical illness that impaired one or more vital organ systems such that there was a high probability of imminent or life threatening deterioration in the patients condition. This care involved high complexity decision making to assess, manipulate, and support vital system functions, to treat this degree vital organ system failure and to prevent further life threatening deterioration of the patients condition. Core Measures:  For Hospitalized Patients:    1. Hospitalization Decision Time:  The decision to hospitalize the patient was made by Sun[employer]. Mica Skinner MD at 6:03 AM on 5/8/2018    2. Aspirin: Aspirin was not given because the patient did not present with a stroke at the time of their Emergency Department evaluation    6:03 AM  Patient is being admitted to the hospital by Bebo Ontiveros MD. The results of their tests and reasons for their admission have been discussed with them and/or available family. They convey agreement and understanding for the need to be admitted and for their admission diagnosis. CONDITIONS ON ADMISSION:  Sepsis is not present at the time of admission. Deep Vein Thrombosis is not present at the time of admission. Thrombosis is not present at the time of admission. Urinary Tract Infection is not present at the time of admission.  Pneumonia is not present at the time of admission. MRSA is not present at the time of admission. Wound infection is not present at the time of admission. Pressure Ulcer is not present at the time of admission. CLINICAL IMPRESSION:    1. Uremia    2. Acute respiratory distress    3. MINERVA (acute kidney injury) ([facility] [address]        PLAN: Admit to telemetry  _________________________    Attestations: This note is prepared by Ainsley Nguyen, acting as Scribe for Chico Lynch MD.    Chico Lynch MD:  The scribe's documentation has been prepared under my direction and personally reviewed by me in its entirety.   I confirm that the note above accurately reflects all work, treatment, procedures, and medical decision making performed by me.  _______________________________

## 2018-05-08 NOTE — CONSULTS
[address] [facility]  MR#: [id number]  : 1950  ACCOUNT #: [de-identified]   DATE OF SERVICE: 2018    REASON FOR CONSULTATION:  Elevated troponin and CHF. HISTORY OF PRESENT ILLNESS:  The patient is a 72-year-old gentleman who I know from previous 1 visit with me in [facility]. in September  for aortic valve stenosis and subsequently he underwent right and left heart catheterization and then he was referred to surgeon for multivessel bypass surgery along with aortic valve replacement. Then the patient was a poor candidate for surgical intervention, subsequently referred for transcatheter aortic valve replacement. He underwent aortic valve replacement, but during the recovery his femoral access complicated associated with bleeding, leading to multiple bleeding issues and slow recovery with frequent infection. Then in subsequent followup with the cardiologist in [facility], the patient was recommended not to undergo for any kind of revascularization for his complex coronary artery disease. The patient overall has waxing and waning complicated course in the last 6 months, and recently THE PATIENT HAS CONVERTED INTO A DNR STATUS. The patient had multiple rehab admissions along with home health aide assistance as well. I had a long, lengthy discussion with his wife on phone and discussed the management plan as well. His past medical history again significant for aortic valve stenosis, moderate mitral calcification, severe pulmonary hypertension, severe multivessel coronary artery disease, history of mild cardiomyopathy. At this point, the patient is admitted in the hospital with fatigue, leg pain, and found to have elevated troponin in the setting of acute renal failure as well. The patient is denying any chest pain, but the patient is lethargic and drowsy and sleepy as well.     The patient is lying flat, denying any orthopnea, PND, but has chronic lower extremity swelling as well. PAST MEDICAL HISTORY:  1. Chronic atrial fibrillation. 2.  Bundle branch block. 3.  Aortic stenosis, status post TAVR. 4.  Severe multivessel coronary artery disease, including 100% occluded LAD. 5.  Pulmonary hypertension. 6.  Diabetes mellitus. 7.  Hypertension. 8.  Dyslipidemia. PAST SURGICAL HISTORY:  Reviewed and discussed. FAMILY HISTORY:  Noncontributory at this point. SOCIAL HISTORY:  Denied any active history of smoking, alcohol or drug abuse. HOME MEDICATIONS:  Include Aldactone, Bumex, ferrous sulfate, Flomax, aspirin, carvedilol, pravastatin, gabapentin, Spiriva, Ultram, cetirizine, warfarin. PHYSICAL EXAMINATION:  GENERAL:  At the time of consultation, lethargic, tired, in mild distress. VITAL SIGNS:  Temperature is 98.7, heart rate 73 per minute, respirations 22 per minute, blood pressure is 110/70, pulse oximetry is 97%. HEENT:  Head is atraumatic, normocephalic. NECK:  Supple, no JVD, no carotid bruit. HEART:  S1, S2 audible. S1 is variable. Ejection systolic murmur in the aortic area. LUNGS:  Bilateral air entry positive. ABDOMEN:  Soft. EXTREMITIES:  Chronic lower extremity edema. NEUROLOGIC:  Responsive to vocal command. DERMATOLOGIC:  Chronic dermopathic changes, probably secondary to diabetes mellitus. LABORATORY DATA:  EKG, left bundle, possible AFib. Hemoglobin 11.9, white count is 26.4. Sodium 133, potassium 5.0. Troponin is 0.88, trending up 1.16 because of renal disease. Creatinine was 1.3 and now it is 2.55. Chest x-ray, enlarged cardiac silhouette. Echocardiogram had shown preserved ejection fraction, mild gradient across the TAVR valve, severely calcified mitral leaflet, cannot exclude vegetation. ASSESSMENT AND PLAN:  1. Elevated troponin in the setting of multiorgan failure. 2.  Severe multivessel coronary artery disease, nonintervenable.   3.  Aortic valve stenosis, status post TAVR with mild gradient. 4.  Moderate to severe mitral calcification and on mitral valve cannot exclude vegetation. I will treat effectively with antibiotic treatment until the other source of infection is found. 5.  Preserved LV function. 6.  Pulmonary hypertension. 7.  Acute renal failure. RECOMMENDATION:  I have discussed in detail with the patient's wife the management and plan. At this point, the plan is to treat with medication. The patient is not a candidate for any surgical intervention from cardiac standpoint. We will repeat the transthoracic echocardiogram to compare the calcification and possible vegetation if it is there. My concern is because of leukocytosis and increasing calcification in the mitral valve. I have reviewed the old echocardiogram that was done 6 months ago in [facility]. that had also shown moderate to marked mitral calcification, but also soft tissue thickening with myxomatous degenerative changes, which has become more worse, cannot exclude vegetation. Discussed with the patient's wife. Patient's wife does not want any heroic or invasive measures including KEREN. Discussed with Dr. Trixie Willoughby, who is going to start the antibiotic after doing the cultures. I will follow the patient.       MD Jazmine Roth / Xenia Phillips  D: 05/08/2018 16:08     T: 05/08/2018 16:42  JOB #: [id number]

## 2018-05-08 NOTE — PROGRESS NOTES

## 2018-05-08 NOTE — ROUTINE PROCESS
0700: TRANSFER - IN REPORT:    Verbal report received from [facility] RN(name) on Dilip Milan  being received from ED(unit) for routine progression of care      Report consisted of patients Situation, Background, Assessment and   Recommendations(SBAR). Information from the following report(s) SBAR, Kardex, Intake/Output, MAR and Recent Results was reviewed with the receiving nurse. Opportunity for questions and clarification was provided. Assessment completed upon patients arrival to unit and care assumed.

## 2018-05-08 NOTE — ED NOTES
Pt incontinent of urine/cleaned up. Pt's wife expressing that she doesn't believe that pt will be able to void in the urinal.  Pt saying \"It's not big enough. \" DR Raul Maldonado to pt's bedside to evaluate pt. Dr Raul Maldonado asking for pt to be straight cath for urine specimen. 150 ml dark yellow urine noted. Urine specimen to lab for processing.

## 2018-05-08 NOTE — PROGRESS NOTES
Edna Rosales 1527 care of pt from Queenie Ramirez. Pt off floor for testing , no signs of distress, call bell within reach.

## 2018-05-09 NOTE — PROGRESS NOTES
Hospitalist Progress Note    Patient: Shannon Powell MRN: [id number]  CSN: [id number]    YOB: 1950  Age: 80 y.o. Sex: male    DOA: 5/8/2018 LOS:  LOS: 1 day                Assessment/Plan     Patient Active Problem List   Diagnosis Code    Coronary artery disease involving native coronary artery of native heart without angina pectoris I25.10    Diabetes mellitus with no complication ([facility]) B58.7    Pseudophakia of both eyes Z96.1    Retinal hole or tear H33.309    Aortic stenosis I35.0    Coronal hypospadias Q54.0    Chronic atrial fibrillation (HCC) I48.2    Chest pain R07.9    Fluid overload E87.70    Tricuspid regurgitation I07.1    Chronic anticoagulation Z79.01    Gout M10.9    Diabetic retinopathy associated with type 2 diabetes mellitus ([facility]) E11.319    Lesion of soft palate K13.79    Uremia N19    Dyspnea R06.00    MINERVA (acute kidney injury) ([facility] N17.9    Pulmonary hypertension (HCC) I27.20    Troponin level elevated R74.8    Acute kidney injury ([facility] N17.9            81 yo male admitted for worsening weakness and confusion. CAD - severe multivessel disease, not a candidate for surgical intervention. Severe aortic stenosis - S/p TAVR   Cardiology following. MINERVA - nephrology following, pre renal.    Pulmonary HTN/volume overload - continue on diuresis. Preserved LVF    Leukocytosis - with blood cultures growing GPR. On vancomycin, ancef and levaquin. Pulmonary following. Dr. Kylee Stiles discussed with patient's wife yesterday. No aggressive measures. Patient will poor prognosis. DNR      Disposition : TBD    Review of systems  General: No fevers or chills. Cardiovascular: No chest pain or pressure. No palpitations. Pulmonary: No shortness of breath. Gastrointestinal: No nausea, vomiting. Physical Exam:  General: Awake, cooperative, no acute distress    HEENT: NC, Atraumatic. PERRLA, anicteric sclerae.   Lungs: Decreased breath sounds lower lungs bilaterally. Heart:  Regular  rhythm,  No murmur, No Rubs, No Gallops  Abdomen: Soft, Non distended, Non tender.  +Bowel sounds,   Extremities: No c/c/e  Psych:   Not anxious or agitated. Neurologic:  No acute neurological deficit. Vital signs/Intake and Output:  Visit Vitals    /62 (BP 1 Location: Left leg, BP Patient Position: At rest;Supine; Head of bed elevated (Comment degrees))    Pulse 77    Temp 97.6 °F (36.4 °C)    Resp 17    Ht 5' 11\" (1.803 m)    Wt 121.1 kg (267 lb)    SpO2 100%    BMI 37.24 kg/m2     Current Shift:  05/09 0701 - 05/09 1900  In: 240 [P.O.:240]  Out: -   Last three shifts:  05/07 1901 - 05/09 0700  In: 816.3 [I.V.:816.3]  Out: 0             Labs: Results:       Chemistry Recent Labs      05/09/18 0327 05/08/18   0230   GLU  157*  145*   NA  135*  133*   K  4.4  5.0   CL  100  94*   CO2  24  28   BUN  111*  109*   CREA  2.43*  2.55*   CA  8.2*  9.2   AGAP  11  11   BUCR  46*  43*   AP   --   102   TP   --   6.7   ALB   --   2.6*   GLOB   --   4.1*   AGRAT   --   0.6*      CBC w/Diff Recent Labs      05/09/18 0327 05/08/18   0806  05/08/18   0230   WBC  21.0*  26.4*  16.2*   RBC  3.73*  3.93*  4.36*   HGB  11.2*  11.9*  13.3   HCT  34.1*  35.7*  39.7   PLT  103*  147  154   GRANS  70  83*  93*   LYMPH  10*  8*  4*   EOS  0  0  0      Cardiac Enzymes Recent Labs      05/08/18   1735  05/08/18   1339   CPK  37*  39   CKND1  CALCULATION NOT PERFORMED WHEN RESULT IS BELOW LINEAR LIMIT  6.4*      Coagulation Recent Labs      05/09/18   0917  05/09/18   0327  05/08/18   2345   05/08/18   0806   PTP   --   48.9*   --    --   43.7*   INR   --   5.5*   --    --   4.7*   APTT  >180.0*   --   >180.0*   < >   --     < > = values in this interval not displayed.        Lipid Panel No results found for: CHOL, CHOLPOCT, CHOLX, CHLST, CHOLV, 949863, HDL, LDL, LDLC, DLDLP, 777141, VLDLC, VLDL, TGLX, TRIGL, TRIGP, TGLPOCT, CHHD, CHHDX   BNP No results for input(s): BNPP in the last 72 hours.    Liver Enzymes Recent Labs      05/08/18   0230   TP  6.7   ALB  2.6*   AP  102   SGOT  53*      Thyroid Studies No results found for: T4, T3U, TSH, TSHEXT, TSHEXT     Procedures/imaging: see electronic medical records for all procedures/Xrays and details which were not copied into this note but were reviewed prior to creation of Plan

## 2018-05-09 NOTE — PROGRESS NOTES
Cardiology Progress Note        Patient: Maldonado Case        Sex: male          DOA: 5/8/2018  YOB: 1950      Age:  80 y.o.        LOS:  LOS: 1 day   Assessment/Plan     Principal Problem:    MINERVA (acute kidney injury) ([facility] (5/8/2018)    Active Problems:    CHF (congestive heart failure) ([facility] (5/8/2018)      Uremia (5/8/2018)      Dyspnea (5/8/2018)      Pulmonary hypertension ([facility] (5/8/2018)      Troponin level elevated (5/8/2018)      Acute kidney injury ([facility] (5/8/2018)        Plan:  Continue with current meds  Continue with supportive treatment                        Subjective:    cc:  CAD  Elevated trop  Possible infective endocarditis      REVIEW OF SYSTEMS:     General: + fevers or chills. Cardiovascular: no chest pain or pressure. No palpitations. +ankle swelling  Pulmonary: + SOB, orthopnea, PND  Gastrointestinal: No nausea, vomiting or diarrhea      Objective:      Visit Vitals    /74 (BP 1 Location: Left leg, BP Patient Position: At rest;Supine; Head of bed elevated (Comment degrees))    Pulse 90    Temp 98.8 °F (37.1 °C)    Resp 16    Ht 5' 11\" (1.803 m)    Wt 121.1 kg (267 lb)    SpO2 99%    BMI 37.24 kg/m2     Body mass index is 37.24 kg/(m^2). Physical Exam:  General Appearance: Comfortable, responsive to vocal commands  NECK: No JVD, no thyroidomeglay. LUNGS: Clear bilaterally. HEART: S1+S2 , ejection systolic murmur at base, systolic murmur at apex    ABD: Non-tender, BS Audible    EXT: chronic dermopathic changes with mild edma  VASCULAR EXAM: Pulses are intact. PSYCHIATRIC EXAM: Mood is appropriate. MUSCULOSKELETAL: Grossly no joint deformity.     Medication:  Current Facility-Administered Medications   Medication Dose Route Frequency    insulin lispro (HUMALOG) injection   SubCUTAneous AC&HS    glucose chewable tablet 16 g  4 Tab Oral PRN    glucagon (GLUCAGEN) injection 1 mg  1 mg IntraMUSCular PRN    dextrose (D50W) injection syrg 12.5-25 g  25-50 mL IntraVENous PRN    [START ON 5/10/2018] atorvastatin (LIPITOR) tablet 40 mg  40 mg Oral DAILY    aspirin delayed-release tablet 81 mg  81 mg Oral DAILY    carvedilol (COREG) tablet 3.125 mg  3.125 mg Oral BID WITH MEALS    loratadine (CLARITIN) tablet 10 mg  10 mg Oral DAILY    ferrous sulfate tablet 325 mg  325 mg Oral ACB    acetaminophen (TYLENOL) tablet 650 mg  650 mg Oral Q4H PRN    levoFLOXacin (LEVAQUIN) 750 mg in D5W IVPB  750 mg IntraVENous Q48H    Vancomycin-Rx to Dose & Monitor  1 Each Other Rx Dosing/Monitoring    ceFAZolin (ANCEF) 1 g in sterile water (preservative free) 10 mL IV syringe  1 g IntraVENous Q8H    acetaminophen (TYLENOL) suppository 650 mg  650 mg Rectal Q6H PRN    morphine (ROXANOL) 100 mg/5 mL (20 mg/mL) concentrated solution 10 mg  10 mg SubLINGual Q3H PRN    LORazepam (ATIVAN) injection 0.5 mg  0.5 mg IntraVENous Q6H PRN               Lab/Data Reviewed:  Procedures/imaging: see electronic medical records for all procedures/Xrays   and details which were not copied into this note but were reviewed prior to creation of Plan       All lab results for the last 24 hours reviewed.      Recent Labs      05/09/18   0327  05/08/18   0806  05/08/18   0230   WBC  21.0*  26.4*  16.2*   HGB  11.2*  11.9*  13.3   HCT  34.1*  35.7*  39.7   PLT  103*  147  154     Recent Labs      05/09/18   0327  05/08/18   0230   NA  135*  133*   K  4.4  5.0   CL  100  94*   CO2  24  28   GLU  157*  145*   BUN  111*  109*   CREA  2.43*  2.55*   CA  8.2*  9.2       Signed By: Roderick Elmore MD     May 9, 2018

## 2018-05-09 NOTE — PROGRESS NOTES
0700: Assumed care of pt. Pt is resting no sign of distress. Ramon Kincaid RN did state that pt was coughing when drinking water last night pt is confused held all po medication and food over night. 8452: Pharmacy called concerned regarding pt INR being elevated due to pt on heparin drip. This RN stated would call  To state concerns r/t INR.   on call paged  [phone]: spoke with  regarding pt INR gave verbal order to discontinue heparin drip. Also gave results for blood cultures and notified Dr. Monique William regarding pt swallowing status and making pt NPO with speech consult. 1540: Pt family meeting with palliative care in room. Pt resting no sign of distress. Diet was changed to meet patients needs in regards to swallowing.

## 2018-05-09 NOTE — ROUTINE PROCESS
Bedside and Verbal shift change report given to A Donal RN (oncoming nurse) by Rocio Espino RN (offgoing nurse). Report included the following information SBAR, Kardex, ED Summary, Procedure Summary, Intake/Output, MAR, Accordion, Recent Results and Med Rec Status.

## 2018-05-09 NOTE — PROGRESS NOTES
Palliative Medicine  [place]: [phone] ([phone])  [facility]: [phone] ([phone])      Resuscitation Status: DNR   Durable DNR addressed? [x] Yes   [] No    [] Not Applicable    Advance Care Planning 5/8/2018   Patient's Healthcare Decision Maker is: Verbal statement (Legal Next of Kin remains as decision maker)   Primary Decision Maker Name Faith Merino    Primary Decision Maker Phone Number [phone]   Primary Decision Maker Relationship to Patient Spouse   Confirm Advance Directive Yes, on file   Patient Would Like to Complete Advance Directive -   Does the patient have other document types -           Patient / Family Encounter Documentation    Participants (names): Patient, his wife Parker Montemayor, PM team members, Raheem Schofield RN, Ysabel Quintero LCSW and Chaplain Albania Davies     Narrative:   PM team visited Mr. Vivienne Javier. He was pleasant and oriented X3. He asked for assistance getting more comfortable in the bed as he was very weak. He did state that he had arthritis in his left shoulder and that he was having difficulty with that arm. His wife entered the room shortly after we arrived. We introduced palliative medicine and Mrs. Vivienne Javier stated she was expecting us. She also shared that she had met with PM team at [facility] when Mr. Vivienne Javier was there a month ago. Initially, Mrs. Vivienne Javier shared that she and her  Carlos Pineda where this was going\" and that their yeni had helped them accept that Mr. Vivienne Javier was nearing the end of his life. \"His broken body is wearing out\". She shared that it had been increasingly difficult for Mr. Vivienne Javier over the last several weeks. He had been getting weaker and it had become difficult for him to do anything he enjoyed. He had eventually gotten too weak to walk so she had brought him to the hospital.     Mr. Vivienne Javier complained of pain in his left shoulder but Mrs. Vivienne Javier told him that he couldn't have any pain medication as his kidneys weren't working well.  PM team explained that he did have pain medications ordered and offered to ask the nurse. Mrs. Hollie Browne her head \"no\" and  indicated that she wanted to speak privately so we went to another room. She then said that he always wanted more attention when she arrived in the afternoon and she did not think he needed pain medication at this time. She told us that she was waiting to speak to the cardiologist, the pulmonologist, the hospitalist and the nephrologist. She stated that she understood Constantin Doran is really sick and now his kidneys are not working as well as they should\". She then stated that she had been told her  has a 60/40 chance of survival so planned to continue current treatment. She did state that if her  had little to no quality of life then she would want to discuss comfort care. She described quality of life for Mr. Mary Beth Patterson as being able to get up and walk around and socialize with his friends. \"If he is stuck in a wheelchair or a nursing home that is not acceptable for him\". She also stated that she would be open to rehab at discharge as he had done that in the past but again stated \"but it would all depend on what he can get out of rehab\". She confirmed DNR and stated that he had actually signed a DDNR in the past. She agreed to provide this document for the chart. She did state that she had wanted to avoid ICU but that she had spoken to her daughter who had explained that moving to ICU \"is a lateral move\". We corrected this misconception and she stated that she would stand by her original decision of no escalation of care. She also stated that she and Mr. Mary Beth Patterson had discussed this at some length and that he did not want \"any form of artificial life support\". She amended that by stating that she understood that her  was having swallowing issues again as he had in the past. She stated that she did not consider an NG tube feeding as artificial life support so would consider that if needed.  \"No permanent tube and no PEG tube\". Psychosocial Issues Identified:   Lives with his wife of 45 years  Two children,   daughter is local,   son is incarcerated \"I haven't been able to touch him for over a year\". Goals of Care / Plan:   Continue current care. Wife states she is waiting to speak to all physicians to gather more information before making any other decisions regarding care for her . States she wants to continue aggressive care but may be open to comfort care if it became clear to her that her  had no acceptable quality of life. \"Right now that is too soon to tell\". If swallowing issues increase, wife would consider NG tube feeding but no PEG. They would like rehab at time of discharge. DNR status. Wife states she will bring in his DDNR. PM team will continue to follow for support.     Raheem Schofield RN

## 2018-05-09 NOTE — PROGRESS NOTES
Pt seen and examined. High temp noted. Labs reviewed. Daughter at bedside. Pt is in cardiorenal syndrome. Not clear if dopamine is effective. As per Dr. Steven Reed, Wife does not want aggressive measure. will continue supportive care short of dialysis. Time spent 35 minutes. This is late note entry. Pt was seen at noon time.

## 2018-05-09 NOTE — ROUTINE PROCESS
0500: The documentation for this period is being entered following the guidelines as defined in the [facility] policy by Mendy Wick RN.

## 2018-05-09 NOTE — PROGRESS NOTES
Problem: Dysphagia (Adult)  Goal: *Acute Goals and Plan of Care (Insert Text)  Recommendations:  Diet: Dental soft/Nectar-thick liquid   Meds: Per patient preference  Aspiration Precautions  Oral Care TID    Goals:  Patient will:  1. Tolerate PO trials with 0 s/s overt distress in 4/5 trials  2. Utilize compensatory swallow strategies/maneuvers (decrease bite/sip, size/rate, alt. liq/sol) with min cues in 4/5 trials  3. Perform oral-motor/laryngeal exercises to increase oropharyngeal swallow function with min cues  4. Complete an objective swallow study (i.e., MBSS) to assess swallow integrity, r/o aspiration, and determine of safest LRD, min A      Outcome: Progressing Towards Goal    Speech LAnguage Pathology bedside swallow   evaluation & TREATMENT     Patient: Maldonado Case (82 y.o. male)  Date: 5/9/2018  Primary Diagnosis: Uremia  MINERVA (acute kidney injury) ([facility]  CHF (congestive heart failure) ([facility])  Dyspnea  Acute kidney injury ([facility]  Troponin level elevated  Pulmonary hypertension ([facility]        Precautions: Aspiration     PLOF: Living with family  ASSESSMENT :  Based on the objective data described below, the patient presents with moderate oropharyngeal dysphagia. Pt A&Ox2. Per RN, pt failed swallow screen after cough/throat clear following water. >90% intelligible. Command following intact. Oral-motor exam revealed structures grossly intact for mastication and deglutition. Pt accepted thin liquid +/- straw with immediate throat clear and weak cough. Required prompts to use single sips. Aspiration s/s resolved with nectar-thick liquid trial. Puree and solid trials; pt with mildly delayed mastication and swallow initiation, 0 s/s of aspiration. Recommend soft solid/nectar-thick liquid diet with aspiration precautions and MBS.  D/w Norma CASEY.     Skilled therapy initiated; Educated pt on aspiration precautions and importance of compensatory swallow techniques to decrease aspiration risk (decrease rate of intake & sip/bite size, upright @HOB for all po intake and ~30 minutes after po); verbalized comprehension, need reinforcement. ST will continue to follow. Patient will benefit from skilled intervention to address the above impairments. Patients rehabilitation potential is considered to be Fair  Factors which may influence rehabilitation potential include:   []            None noted  []            Mental ability/status  []            Medical condition  []            Home/family situation and support systems  [x]            Safety awareness  []            Pain tolerance/management  []            Other:      PLAN :  Recommendations and Planned Interventions:  Soft solid, NTL  Frequency/Duration: Patient will be followed by speech-language pathology 1-2 times per day/4-7 days per week to address goals. Discharge Recommendations: To Be Determined     SUBJECTIVE:   Patient stated I want water. OBJECTIVE:     Past Medical History:   Diagnosis Date    Aortic stenosis 9/29/2017    Bilateral artificial lens implant 1997, 1999    Chronic anticoagulation 9/29/2017    Chronic atrial fibrillation ([address] 9/29/2017    Coronal hypospadias 9/29/2017    Coronary artery disease involving native coronary artery of native heart without angina pectoris 9/28/2017    Diabetes mellitus with no complication ([address] 7/15/4986    Last Assessment & Plan:  No apparent diabetic retinopathy. . Pt told to keep regular primary care and eye appointments to reduce the risk of visual loss from diabetic eye complications, and to follow up immediately for changes in vision.  Diabetic retinopathy associated with type 2 diabetes mellitus ([address] 9/29/2017    Fluid overload     Gout 9/29/2017    Lesion of soft palate 7/29/2017    ENT planned biopsy    Pseudophakia of both eyes 9/29/2017    Last Assessment & Plan:  Vision stable. IOL's well centered. Pt pleased with visual result post-op OD 1999 and OS 1997.   To call if any visual changes.  Retinal hole or tear 6/12/2017    Last Assessment & Plan:  [facility] well surrounded. Stable. Will follow    Tricuspid regurgitation 9/29/2017     Past Surgical History:   Procedure Laterality Date    HX CATARACT REMOVAL Bilateral 1997, 1999    b/l lens implant    HX CHOLECYSTECTOMY      HX KNEE REPLACEMENT      HX SHOULDER REPLACEMENT       Prior Level of Function/Home Situation: Living with family  Home Situation  Home Environment: Private residence  One/Two Story Residence: One story  Living Alone: No  Support Systems: Spouse/Significant Other/Partner  Patient Expects to be Discharged to[de-identified] Private residence  Current DME Used/Available at Home: Verneda Don, straight, Grab bars  Diet prior to admission: Regular/thin  Current Diet:  Soft solids/NTL   Cognitive and Communication Status:  Neurologic State: Alert, Confused  Orientation Level: Oriented to person, Oriented to place, Disoriented to situation, Disoriented to time  Cognition: Follows commands  Perception: Appears intact  Perseveration: No perseveration noted  Safety/Judgement: Decreased insight into deficits  Oral Assessment:  Oral Assessment  Labial: No impairment  Dentition: Natural;Intact  Oral Hygiene: Fair  Lingual: Decreased rate  Velum: No impairment  Mandible: No impairment  P.O. Trials:  Patient Position: [facility] [place] [facility]  Vocal quality prior to P.O.: No impairment  Consistency Presented: Thin liquid; Solid;Puree;Nectar thick liquid  How Presented: SLP-fed/presented;Straw;Cup/sip     Bolus Acceptance: No impairment  Bolus Formation/Control: Impaired  Type of Impairment: Delayed;Mastication  Propulsion: Delayed (# of seconds)  Oral Residue: None  Initiation of Swallow: Delayed (# of seconds)  Laryngeal Elevation: Functional  Aspiration Signs/Symptoms: Strong cough;Clear throat  Pharyngeal Phase Characteristics: Suspected pharyngeal residue  Effective Modifications: Small sips and bites; Alternate liquids/solids  Cues for Modifications: Moderate       Oral Phase Severity: Mild  Pharyngeal Phase Severity : Moderate    GCODESwallowing:  Swallow Current Status CK= 40-59%   Swallow Goal Status CH= 0%    The severity rating is based on the following outcomes:  GRIS Noms Swallow Level 4    Clinical Judgement    PAIN:  Start of Eval/Tx: 0  End of Eval/Tx: 0     After treatment:   []            Patient left in no apparent distress sitting up in chair  [x]            Patient left in no apparent distress in bed  [x]            Call bell left within reach  [x]            Nursing notified  []            Family present  []            Caregiver present  []            Bed alarm activated    COMMUNICATION/EDUCATION:   [x]            Aspiration precautions; swallow safety; compensatory techniques. [x]            Patient/family have participated as able in goal setting and plan of care. [x]            Patient/family agree to work toward stated goals and plan of care. []            Patient understands intent and goals of therapy; neutral about participation. []            Patient unable to participate in goal setting/plan of care; educ ongoing with interdisciplinary staff  [x]         Posted safety precautions in patient's room.     Thank you for this referral.  MOISES Carcamo  Time Calculation: 20 mins  Evaluation Time: 10 minutes   Treatment Time: 10 minutes

## 2018-05-09 NOTE — PROGRESS NOTES
1900: Received report from [facility]. White board updated. Pt is alert and opens eyes simultaneously  and but remains disoriented to situation. Pt currently does not report any pain but is c/o dyspnea at rest, pt is on non-rebreathing mask at 10L, also pt is noted using accessory muscles to breath. Pt's questions and concerns addressed at this time. Will continue to monitor. 2026: Called Dr. Guadalupe Baker about critical Troponin at 1.27, orders to continue monitoring, continue with heparin drip, and nursing miscellaneous order that there is no need to alert Dr. Guadalupe Baker again with troponin values. 2130: Called Dr. Anusha Meyer about pt not tolerating drinking water and swallowing PO medication, orders to perform a swallow evaluation and to make pt aspiration precautions. 0030: Lab called with critical PTT result of > 180, heparin drip stopped for 1 hour and to be restarted @ 12 and another PTT to be drawn @ 0730.    0208: Pt's BP 63/12, manual recheck was 65/25, Dr. Anusha Meyer notified, orders to give bolus of NS [id number] IV if pt's systolic does not reach 90.    0300: BP 75/35, second bolus to be given. 0341: Second 250 mL NS bolus given , BP 71/50, Dr. Anusha Meyer notified, orders for 500 mL NS bolus to be given, as well as CBC. CMP and mag  Labs to be drawn in the AM.    0415: Lab called with critical INR result of 5.5 on pt, Dr. Anusha Meyer notified, no orders given at this time. 8307: Pt's blood pressure 104/50's     0632: Dr. Anusha Meyer notified that pt's BP is still low and heart is decreasing as low as 38, orders to start another 50 mL NS bolus IV one time.

## 2018-05-09 NOTE — CDMP QUERY
A diagnosis of CHF is documented in the H&P. When determined, please specify the type of CHF as Acute, Chronic, or Acute on Chronic and Systolic, Diastolic or Combined    Risk: Aortic stenosis, CAD, a. fib, TAVR    Clinical Indicators: patient presented w/ increased weakness, AMS, tachypnea RR 30's  CXR on 5/8 showed Cardiomegaly with findings of CHF versus fluid overload and developing edema. 2D echo on 5/8 showed EF 86-97%, systolic function was normal, diastolic function was indeterminate. Treatment:  2D echo, KEREN planned, Coreg, lasix IV x1    Please clarify and document your clinical opinion in the progress notes and discharge summary including the definitive and/or presumptive diagnosis, (suspected or probable), related to the above clinical findings. Please include clinical findings supporting your diagnosis. If you DECLINE this query or would like to communicate with [facility], please utilize the \"[employer] message box\" at the TOP of the Progress Note on the right.       Thank you,    Nina Austin [facility], [address]

## 2018-05-09 NOTE — PROGRESS NOTES
Speech Therapy Screening:  Services are indicated and therapy order is requested. An InBasket screening referral was triggered for speech therapy based on results obtained during the nursing admission assessment. The patient failed the dysphagia screen (cough, throat clear, discomfort with swallow). The patients chart was reviewed and the patient is appropriate for a skilled therapy evaluation. Please order a consult for speech therapy if you are in agreement and would like an evaluation to be completed. Thank you.     Herve Bradley, SLP

## 2018-05-09 NOTE — PROGRESS NOTES
Assessed pt. Pt stated \" I need air\". SpO2 95% on RA. Pt was placed on 3L NC for comfort. SpO2 97%. RR 28. Explained to RN, there appears to be an anxiety component to pt's current condition. Advised pt be given PRN ativan.      Julianna Bustamante, RRT

## 2018-05-09 NOTE — PROGRESS NOTES
Problem: Pressure Injury - Risk of  Goal: *Prevention of pressure injury  Document Angel Scale and appropriate interventions in the flowsheet.    Outcome: Progressing Towards Goal  Pressure Injury Interventions:  Sensory Interventions: Assess changes in LOC, Check visual cues for pain, Keep linens dry and wrinkle-free    Moisture Interventions: Absorbent underpads, Check for incontinence Q2 hours and as needed, Limit adult briefs    Activity Interventions: Pressure redistribution bed/mattress(bed type)    Mobility Interventions: Pressure redistribution bed/mattress (bed type)    Nutrition Interventions: Document food/fluid/supplement intake    Friction and Shear Interventions: HOB 30 degrees or less, Lift sheet

## 2018-05-09 NOTE — PROGRESS NOTES
and Palliative team - LUIZ Marshall and RN Fitz Hilton - met Mr Kerline Alvarez in his room; his spouse Kasey arrived shortly after. Mr Kerline Alvarez appears to be a little short of breath and weak, asking for help to move his left arm.  offered listening support and compassion as Kasey shared a little about the past few weeks caring for Mr Kerline Alvarez. She clearly states that her [place] Startex yeni has given her strength, even at the end of life.  offered ministry of presence to Mr Kerline Alvarez while RN and SW left to talk with Kasey privately. Palliative team will continue to follow for support.

## 2018-05-09 NOTE — PROGRESS NOTES
Pharmacy Dosing Services: Vancomycin    Consult for Vancomycin Dosing by Pharmacy by Dr. Lucila Ayoub provided for this 80y.o. year old male , for indication of pneumonia (CAP) / sepsis. Day of Therapy 2  Scr = 2.45   CrCl = 39.1 ml/min   WBC = 21    Patient received initial loading dose of Vancomycin 1500 mg IV once, administered 5/8/18 at 21:22. Continue therapy with maintenance dose:  Vancomycin 1500 mg IV every 24 hours, scheduled for 5/9/18 at 22:00. Dose calculated to approximate a therapeutic trough of 15 - 20 mcg/mL. Ht Readings from Last 1 Encounters:   05/08/18 180.3 cm (71\")        Wt Readings from Last 1 Encounters:   05/09/18 121.1 kg (267 lb)      Other Current Antibiotics Levofloxacin / Cefazolin   Significant Cultures pending   Serum Creatinine Lab Results   Component Value Date/Time    Creatinine 2.43 (H) 05/09/2018 03:27 AM      Creatinine Clearance Estimated Creatinine Clearance: 39.1 mL/min (based on Cr of 2.43). BUN Lab Results   Component Value Date/Time     (H) 05/09/2018 03:27 AM      WBC Lab Results   Component Value Date/Time    WBC 21.0 (H) 05/09/2018 03:27 AM      H/H Lab Results   Component Value Date/Time    HGB 11.2 (L) 05/09/2018 03:27 AM      Platelets Lab Results   Component Value Date/Time    PLATELET 551 (L) 64/25/5973 03:27 AM      Temp 97.6 °F (36.4 °C)     Pharmacy to follow daily and will make changes to dose and/or frequency based on clinical status.   Pharmacist Verlyn Ahumada, [facility]

## 2018-05-09 NOTE — PROGRESS NOTES
Reason for Admission:    Per chart, pt is a 80 y.o.  male who has sever pulm [id number], Morbid obesity, CHF  3v cad presents to ER with worsening weakness and confusion   Wife has had to support him and prevent 2 falls   He is also having loss of urine and stool control  With worsening confusion since being Discharge from [facility] post TAVR and femoral artery cut down   Patient has dyspnea with minimal exertion   Recently had bumex adjusted /cut back due to worsening creatine   Creatine last month 1.03 now 2. 55 with relative hyperkalemia   Patient was initially transferred for [facility] to [place] but deemed not a canidate due to multiple comorbidities  Troponin today in ER elevated                RRAT Score:     28, high             Resources/supports as identified by patient/family:                   Top Challenges facing patient (as identified by patient/family and CM): Finances/Medication cost?                    Transportation? Support system or lack thereof? Living arrangements? Lives with wife, daughter nearby           Self-care/ADLs/Cognition? Current Advanced Directive/Advance Care Plan:                            Plan for utilizing home health:    FOC for [address]                      Likelihood of readmission: high                 Transition of Care Plan:          TBD   ([facility] vs Rehab vs Hospice)      Care Management Interventions  Transition of Care Consult (CM Consult): Discharge Planning     1220  Attended IDRs with MD Alisson Miguel and primary RN Norma.  Pts daughter present at bedside for IDRs. Pt noted to be on oxygen in the room. Pt denies being on home oxygen. Pts PCP is MD Corina Gomez. Pt being followed by MD Valente Munoz. Pt lives with wife (pts daughter's stepmother). Discharge disposition: TBD.    1430  Met with patient at bedside. Pt states he does not currently have home health, but states he previously had [address].   [facility] offered, but would like [facility] if [place] indicated. Pt gave verbal permission to discuss care with his daughter and wife, Yvrose Chan. Pt asking for water, pts primary RN Norma gates and RN to bedside.     Name Relation Home Work [address] [phone]

## 2018-05-09 NOTE — PROGRESS NOTES
Problem: Falls - Risk of  Goal: *Absence of Falls  Document Fatimah Fall Risk and appropriate interventions in the flowsheet. Outcome: Progressing Towards Goal  Fall Risk Interventions:  Mobility Interventions: Communicate number of staff needed for ambulation/transfer    Mentation Interventions: Adequate sleep, hydration, pain control, Bed/chair exit alarm, Door open when patient unattended, Reorient patient, More frequent rounding    Medication Interventions: Bed/chair exit alarm    Elimination Interventions: Bed/chair exit alarm, Call light in reach, Toileting schedule/hourly rounds    History of Falls Interventions: Bed/chair exit alarm, Door open when patient unattended, Investigate reason for fall, Room close to nurse's station        Problem: Pressure Injury - Risk of  Goal: *Prevention of pressure injury  Document Angel Scale and appropriate interventions in the flowsheet.    Outcome: Progressing Towards Goal  Pressure Injury Interventions:  Sensory Interventions: Assess changes in LOC, Check visual cues for pain, Keep linens dry and wrinkle-free    Moisture Interventions: Absorbent underpads, Check for incontinence Q2 hours and as needed, Limit adult briefs    Activity Interventions: Pressure redistribution bed/mattress(bed type)    Mobility Interventions: Pressure redistribution bed/mattress (bed type)    Nutrition Interventions: Document food/fluid/supplement intake    Friction and Shear Interventions: HOB 30 degrees or less, Lift sheet

## 2018-05-09 NOTE — CONSULTS
Lavonne Cadena M.D  [address]. [facility] [place] [address] [address] [facility]  Phone [phone]   Fax [phone]    Pulmonary Critical Care & Sleep Medicine           IMPRESSION:   · Admitted with AMS found to have GPR in blood and sepsis  · CAD- multivessel disease : Cardiac Cath 9/28/17  · Pulmonary hypertension with pulmonary vascular resistance of 4.425 Wood units consistent with the combination etiology of left ventricular dysfunction with left ventricular end-diastolic pressure of 31 mmHg (Class 2), and sleep apnea and lung disease (class 3)? CTEPH (class 4) - Pulmonary capillary wedge pressure mean was 33 mmHg  · Acute renal failure   · Chronic Atrial fibrillation-- was on coumadin on hold due to elevated INR   · Aortic stenosis S/P TVAER  · Dilated cadiomyopathy  · Chronic anticoagulation- history of DVT: Chronic bilateral non- occlusive DVT : Doppler 9/29/17  · HTN  · Morbid Obesity  · MICHAEL- Patient reports diagnosed 15 years ago- CPAP intolerant-- Use if able to tolerate and respiratory distress   · Distant pipe smoker- quit 30 years ago  · Moderate to severe-restrictive lung disease with possible small airway disease- PFTs 10/2/17  · DNR status       RECOMMENDATIONS:  GPR in blood on cefazolin and levaquin  Adjust with culture  Taper oxygen as tolerated  Keep in negative balance  As outlined below pulmonary HTN management   Monitor for aspiration  Once able to recover will benefit from re evalaution of Pulmonary HTN post TVAR  Again looks like patient has severe CAD and overall extremly poor prognosis and family does not wish any heroic measures  Consider palliative consult  Will be available to help but as a pulmonary physician my role is very limited at this point    · Pulmonary hypertension- Etiology most likely multifactorial Class 2,3 and possible 4. Would avoid phosphodiesterase due to cardiac status- especially aortic stenosis.    · Start bronchodilators and monitor for response  · Continue oxygen  · Will continue to follow       Subjective/History:     The patient is a 52-year-old gentleman who I know from previous 1 visit with me in [facility]. in  for aortic valve stenosis and subsequently he underwent right and left heart catheterization and then he was referred to surgeon for multivessel bypass surgery along with aortic valve replacement. Then the patient was a poor candidate for surgical intervention, subsequently referred for transcatheter aortic valve replacement. He underwent aortic valve replacement, but during the recovery his femoral access complicated associated with bleeding, leading to multiple bleeding issues and slow recovery with frequent infection. Then in subsequent followup with the cardiologist in [facility] [place], the patient was recommended not to undergo for any kind of revascularization for his complex coronary artery disease. The patient overall has waxing and waning complicated course in the last 6 months, and recently THE PATIENT HAS CONVERTED INTO A DNR STATUS. The patient had multiple rehab admissions along with home health aide assistance as well. He is a distant smoker of tobacco (pipe quite 30+ years ago) and [place] (quit 1970's), Denies any other drug use. He is also having loss of urine and stool control  With worsening confusion since being Discharge from [facility] post TAVR and femoral artery cut down   Patient has dyspnea with minimal exertion   Recently had bumex adjusted /cut back due to worsening creatine   Creatine last month 1.03 now 2. 55 with relative hyperkalemia.     I was called to assist in management   Blood culture positive GPR   CXR ok   Labs and abg reviewed     Past Medical History:   Diagnosis Date    Aortic stenosis 9/29/2017    Bilateral artificial lens implant 1997, 1999    Chronic anticoagulation 9/29/2017    Chronic atrial fibrillation ([address] 9/29/2017    Coronal hypospadias 9/29/2017    Coronary artery disease involving native coronary artery of native heart without angina pectoris 9/28/2017    Diabetes mellitus with no complication ([facility] [address] 6/84/3621    Last Assessment & Plan:  No apparent diabetic retinopathy. . Pt told to keep regular primary care and eye appointments to reduce the risk of visual loss from diabetic eye complications, and to follow up immediately for changes in vision.  Diabetic retinopathy associated with type 2 diabetes mellitus ([facility] [address] 9/29/2017    Fluid overload     Gout 9/29/2017    Lesion of soft palate 7/29/2017    ENT planned biopsy    Pseudophakia of both eyes 9/29/2017    Last Assessment & Plan:  Vision stable. IOL's well centered. Pt pleased with visual result post-op OD 1999 and OS 1997. To call if any visual changes.  Retinal hole or tear 6/12/2017    Last Assessment & Plan:  [facility] well surrounded. Stable. Will follow    Tricuspid regurgitation 9/29/2017      Past Surgical History:   Procedure Laterality Date    HX CATARACT REMOVAL Bilateral 1997, 1999    b/l lens implant    HX CHOLECYSTECTOMY      HX KNEE REPLACEMENT      HX SHOULDER REPLACEMENT        Prior to Admission medications    Medication Sig Start Date End Date Taking? Authorizing Provider   spironolactone (ALDACTONE) 25 mg tablet Take 25 mg by mouth two (2) times a day. Yes Navdeep Willoughby MD   bumetanide (BUMEX) 2 mg tablet Take 2 mg by mouth two (2) times a day. Indications: takes a total of 3 mg a day   Yes Navdeep Willoughby MD   tamsulosin (FLOMAX) 0.4 mg capsule Take 0.4 mg by mouth daily. Yes Navdeep Willoughby MD   aspirin delayed-release 81 mg tablet Take 1 Tab by mouth daily. 10/6/17  Yes Ning Ornelas MD   carvedilol (COREG) 12.5 mg tablet Take 1 Tab by mouth two (2) times daily (with meals). Patient taking differently: Take 3.125 mg by mouth two (2) times daily (with meals). 10/6/17  Yes Ning Ornelas MD   pravastatin (PRAVACHOL) 40 mg tablet Take 1 Tab by mouth nightly.  10/6/17  Yes Reinaldo Burr Jeffrey Hill MD   gabapentin (NEURONTIN) 600 mg tablet Take 600 mg by mouth three (3) times daily. Yes Historical Provider   ferrous sulfate 325 mg (65 mg iron) tablet Take 325 mg by mouth Daily (before breakfast). Navdeep Willoughby MD   tiotropium (SPIRIVA) 18 mcg inhalation capsule Take 1 Cap by inhalation daily. 10/6/17   Little Silva MD   traMADol Hodan Gambles) 50 mg tablet Take 1 Tab by mouth every six (6) hours as needed. Max Daily Amount: 200 mg. Patient taking differently: Take 50 mg by mouth every eight (8) hours as needed. 10/6/17   Little Silva MD   cetirizine (ZYRTEC) 10 mg tablet Take 10 mg by mouth daily as needed. Historical Provider   warfarin (COUMADIN) 5 mg tablet Take 7.5 mg by mouth daily. Every day except wednesday    Historical Provider     Current Facility-Administered Medications   Medication Dose Route Frequency    insulin lispro (HUMALOG) injection   SubCUTAneous AC&HS    [START ON 5/10/2018] atorvastatin (LIPITOR) tablet 40 mg  40 mg Oral DAILY    aspirin delayed-release tablet 81 mg  81 mg Oral DAILY    carvedilol (COREG) tablet 3.125 mg  3.125 mg Oral BID WITH MEALS    loratadine (CLARITIN) tablet 10 mg  10 mg Oral DAILY    ferrous sulfate tablet 325 mg  325 mg Oral ACB    levoFLOXacin (LEVAQUIN) 750 mg in D5W IVPB  750 mg IntraVENous Q48H    Vancomycin-Rx to Dose & Monitor  1 Each Other Rx Dosing/Monitoring    ceFAZolin (ANCEF) 1 g in sterile water (preservative free) 10 mL IV syringe  1 g IntraVENous Q8H     No Known Allergies   Social History   Substance Use Topics    Smoking status: Former Smoker    Smokeless tobacco: Never Used    Alcohol use Yes      Family History   Problem Relation Age of Onset    Family history unknown: Yes        Review of Systems:  A comprehensive review of systems was negative except for that written in the HPI. Objective:   Vital Signs:    Visit Vitals    /62 (BP 1 Location: Left leg, BP Patient Position: At rest;Supine; Head of bed elevated (Comment degrees))    Pulse 77    Temp 97.6 °F (36.4 °C)    Resp 17    Ht 5' 11\" (1.803 m)    Wt 121.1 kg (267 lb)    SpO2 100%    BMI 37.24 kg/m2       O2 Device: Nasal cannula   O2 Flow Rate (L/min): 3 l/min   Temp (24hrs), Av °F (37.8 °C), Min:97.6 °F (36.4 °C), Max:102.9 °F (39.4 °C)       Intake/Output:   Last shift:         Last 3 shifts: 1901 - 700  In: 816.3 [I.V.:816.3]  Out: 0     Intake/Output Summary (Last 24 hours) at 18 1615  Last data filed at 18 0400   Gross per 24 hour   Intake           816.25 ml   Output                0 ml   Net           816.25 ml       Physical Exam:    General:  Alert, minimally responsive    Head:  Normocephalic, without obvious abnormality, atraumatic. Eyes:  Conjunctivae/corneas clear. PERRL, EOMs intact. Nose: Nares normal. Septum midline. Mucosa normal. No drainage or sinus tenderness. Throat: Lips, mucosa- normal, Macroglossia, Mallampati:5- unable to view fully posterior oropharynx. Neck: Supple, symmetrical, trachea midline, no adenopathy, thyroid: no enlargment/tenderness/nodules no JVD. Back:   Symmetric, no curvature. ROM normal.   Lungs:   Clear to auscultation bilaterally. Chest wall:  No tenderness or deformity. Heart:  Regular rate and rhythm, S1, S2 normal, + loud  murmur,  No click, rub or gallop. Abdomen:   Obese Soft, non-tender. Bowel sounds normal. No masses,  No organomegaly- palpated   Extremities: Extremities normal, atraumatic, no cyanosis or edema. - wearing compression stockings   Pulses: 2+ and symmetric all extremities.    Skin: Skin color, texture, turgor normal. No rashes or lesions   Lymph nodes:    Cervical, supraclavicular, and nodes normal.   Neurologic: Grossly nonfocal       Data:     CBC w/Diff Recent Labs      18   0327  18   0806  18   0230   WBC  21.0*  26.4*  16.2*   RBC  3.73*  3.93*  4.36*   HGB  11.2*  11.9*  13.3   HCT  34.1*  35.7*  39.7   PLT  103* 147  154   GRANS  70  83*  93*   LYMPH  10*  8*  4*   EOS  0  0  0        Chemistry Recent Labs      05/09/18   0327  05/08/18   0230   GLU  157*  145*   NA  135*  133*   K  4.4  5.0   CL  100  94*   CO2  24  28   BUN  111*  109*   CREA  2.43*  2.55*   CA  8.2*  9.2   MG  2.3   --    AGAP  11  11   BUCR  46*  43*   AP   --   102   TP   --   6.7   ALB   --   2.6*   GLOB   --   4.1*   AGRAT   --   0.6*        Lactic Acid No results found for: LAC  No results for input(s): LAC in the last 72 hours. Micro  Recent Labs      05/08/18   1806 05/08/18   1735 05/08/18   0354   CULT  CULTURE IN PROGRESS,FURTHER UPDATES TO FOLLOW  CULTURE IN PROGRESS,FURTHER UPDATES TO FOLLOW  NO GROWTH AFTER 13 HOURS     Recent Labs      05/08/18   1806 05/08/18   1735 05/08/18   0354   CULT  CULTURE IN PROGRESS,FURTHER UPDATES TO FOLLOW  CULTURE IN PROGRESS,FURTHER UPDATES TO FOLLOW  NO GROWTH AFTER 13 HOURS        ABG Recent Labs      05/08/18   1723  05/08/18   0439   PHI  7.527*  7.493*   PCO2I  30.9*  32.3*   PO2I  88  89   HCO3I  25.6  24.8   FIO2I   --   0.28        Liver Enzymes Protein, total   Date Value Ref Range Status   05/08/2018 6.7 6.4 - 8.2 g/dL Final     Albumin   Date Value Ref Range Status   05/08/2018 2.6 (L) 3.4 - 5.0 g/dL Final     Globulin   Date Value Ref Range Status   05/08/2018 4.1 (H) 2.0 - 4.0 g/dL Final     A-G Ratio   Date Value Ref Range Status   05/08/2018 0.6 (L) 0.8 - 1.7   Final     AST (SGOT)   Date Value Ref Range Status   05/08/2018 53 (H) 15 - 37 U/L Final     Alk.  phosphatase   Date Value Ref Range Status   05/08/2018 102 45 - 117 U/L Final     Recent Labs      05/08/18   0230   TP  6.7   ALB  2.6*   GLOB  4.1*   AGRAT  0.6*   SGOT  53*   AP  102        Cardiac Enzymes Lab Results   Component Value Date/Time    CPK 37 (L) 05/08/2018 05:35 PM    CKMB <1.0 05/08/2018 05:35 PM    CKND1  05/08/2018 05:35 PM     CALCULATION NOT PERFORMED WHEN RESULT IS BELOW LINEAR LIMIT    TROIQ 1.27 ([facility]) 05/08/2018 05:35 PM        BNP No results found for: BNP, BNPP, XBNPT     Coagulation Recent Labs      05/09/18   0917  05/09/18   0327  05/08/18   2345  05/08/18   1339  05/08/18   0806   PTP   --   48.9*   --    --   43.7*   INR   --   5.5*   --    --   4.7*   APTT  >180.0*   --   >180.0*  >180.0*   --          Thyroid  No results found for: T4, T3U, TSH, TSHEXT       Lipid Panel No results found for: CHOL, CHOLPOCT, CHOLX, CHLST, CHOLV, 356467, HDL, LDL, LDLC, DLDLP, 418558, VLDLC, VLDL, TGLX, TRIGL, TRIGP, TGLPOCT, CHHD, CHHDX       Urinalysis Lab Results   Component Value Date/Time    Color YELLOW 05/08/2018 03:54 AM    Appearance CLEAR 05/08/2018 03:54 AM    Specific gravity 1.015 05/08/2018 03:54 AM    pH (UA) 5.0 05/08/2018 03:54 AM    Protein NEGATIVE  05/08/2018 03:54 AM    Glucose NEGATIVE  05/08/2018 03:54 AM    Ketone NEGATIVE  05/08/2018 03:54 AM    Bilirubin NEGATIVE  05/08/2018 03:54 AM    Urobilinogen 1.0 05/08/2018 03:54 AM    Nitrites NEGATIVE  05/08/2018 03:54 AM    Leukocyte Esterase TRACE (A) 05/08/2018 03:54 AM    Epithelial cells 1+ 05/08/2018 03:54 AM    Bacteria NEGATIVE  05/08/2018 03:54 AM    WBC 3 to 5 05/08/2018 03:54 AM    RBC NEGATIVE  05/08/2018 03:54 AM        XR (Most Recent). CXR reviewed by me and compared with previous CXR   Results from Hospital Encounter encounter on 05/08/18   XR CHEST PORT   Narrative EXAM:Chest X-Ray      History: Respiratory distress    Technique:  Portable Frontal View    Comparison: 05/08/2018, 09/29/2017    _______________    FINDINGS:  Cardiomegaly with findings of bronchovascular congestion and cephalization. Postoperative changes of previous aortic valve repair. Mild diffuse increased hazy interstitial opacities with areas of patchy  confluence at both lung bases. No pneumothorax or effusion. Stable osseous structures with previous total right humeral arthroplasty. _______________         Impression IMPRESSION:    1.  Cardiomegaly with findings of CHF versus fluid overload and developing edema. CT (Most Recent)   Results from Hospital Encounter encounter on 09/29/17   CT CHEST W CONT   Narrative CT Chest With Contrast    CPT CODE: 04754    INDICATION: Severe coronary artery disease, severe pulmonary hypertension,  aortic stenosis, mitral regurgitation, Aortic dz, non-traumatic, known or  suspect. Evaluate size of thoracic aorta and extent of atherosclerotic  calcification    COMPARISON: Chest x-ray 9/29/2017 and 6/3/2010. TECHNIQUE: Standard helical images were obtained from the thoracic inlet through  the adrenals at 5 mm thick sections following the intravenous injection of a  bolus of 100 cc nonionic contrast.  All CT scans at this facility are performed  using dose optimization technique as appropriate to a performed exam, to include  automated exposure control, adjustment of the mA and/or kV according to  patient's size (including appropriate matching for site-specific examinations),  or use of iterative reconstruction technique. Images were reviewed on both soft tissue, lung, and bone window settings. Coronal and sagittal reformations obtained. FINDINGS:    Thoracic aorta:  Ascending aorta proximally 3.8 cm. Calcified valve. Ascending aorta at the level of the main pulmonary artery-4.9 x 5.3 cm. Aortic arch-proximal 4 centimeter, Distal 3.2 cm  Proximal descending aorta 3.2 cm. Descending aorta at the main pulmonary artery-2.6 x 3.3 cm  Distal descending aorta 2.9 cm. Scattered minimal plaque in the thoracic aorta. Calcified plaque coronary arteries. Main pulmonary artery-5.5 cm  Right main pulmonary artery 3.6 cm. Left main pulmonary artery 2.8 cm. Severe calcified plaque within the celiac axis and splenic artery. Scattered  plaque abdominal aorta and bilateral renal arteries as well as superior  mesenteric artery. The lungs are well inflated. There is no infiltrate .   Calcified granuloma or small calcified pleural plaque left upper lobe ventrally. Calcified granuloma or calcified pleural plaque superior segment right lower  lobe medially. There is no evidence of mediastinal, hilar, nor axillary adenopathy. Small pericardial effusion measures 1.2 cm at the level of the left ventricle. There are no pleural effusions. There is no pericardial effusion. The included portion of the thyroid gland . The soft tissues of the chest wall are unremarkable. The bony structures are unremarkable. The included portion of the of the liver is unremarkable. The adrenal glands  are normal. 1.5 cm left intrarenal mid pole nonobstructing calculus. Impression IMPRESSION:    Fusiform dilatation of the ascending aorta with maximum diameter of 5.3 cm. Scattered minimal plaque in the thoracic aorta. Pulmonary arterial enlargement with the main pulmonary artery measuring 5.5 cm. Small pericardial effusion. A bilateral solitary calcified granuloma vs. a calcified pleural plaque. Nonobstructing left intrarenal calculus         EKG No results found for this or any previous visit. ECHO No results found for this or any previous visit. PFT No flowsheet data found. Other ASA reactivity:   Pre-albumin:   Ionized Calcium:   NH4:   T3, FT4:  Cortisol:  Urine Osm:  Urine Lytes:   HbA1c:        Echo: 10/2/17       SUMMARY:  Left ventricle: Patient is irregular rhythm throughout exam, determining   ejection fraction and SKYLAR is technically  difficult. The ventricle was dilated. Systolic function was mildly reduced by   EF (biplane method of disks). Ejection  fraction was estimated in the range of 45 % to 50 %. There was mild diffuse   hypokinesis with regional variations. Wall  thickness was markedly increased. Right ventricle: The ventricle was dilated. Systolic function was reduced. Systolic pressure was markedly increased. Estimated peak pressure was at least 69 mmHg.     Left atrium: The atrium was severely dilated. Right atrium: The atrium was severely dilated. Aortic valve: Leaflets exhibited moderately increased thickness,   calcification, and reduced mobility. There was  moderate stenosis. Valve mean gradient was 35 mmHg. Estimated aortic valve   area (by VTI) was 1.2 cm-sq. Tricuspid valve: There was mild regurgitation. Aorta, systemic arteries: The root exhibited mild dilatation. INDICATIONS: Shortness of breath. Pulmonary Function Studies: 10/2/17  Moderate restrictive ventilatory defect, Reduced diffusion capacity indicating a decrease in alveolar surface area for gas exchange, flow volume loop suggestive of small airways obstruction    Right Heart Cath: 9/28/17    RIGHT HEART HEMODYNAMICS:  1. Pulmonary wedge pressure mean was 33 mmHg, A-wave was 39  and V-wave was 43 mmHg. 2. PA pressure 88/47 with a mean PA pressure of 63 mmHg. 3. RV 85/13.  4. Right atrial mean pressure is 30 mmHg, A-wave was 40 and V-wave  was 32 mmHg. Imaging:  CT-Chest: 9/30/17: IMPRESSION:     Fusiform dilatation of the ascending aorta with maximum diameter of 5.3 cm. Scattered minimal plaque in the thoracic aorta. Pulmonary arterial enlargement with the main pulmonary artery measuring 5.5 cm. Small pericardial effusion. A bilateral solitary calcified granuloma vs. a calcified pleural plaque. Nonobstructing left intrarenal calculus           10/2/17  Left Subclavian: Normal     INTERPRETATION/FINDINGS  Duplex images were obtained using 2-D gray scale, color flow, and  spectral Doppler analysis. 1. Bilateral <50% stenosis of the internal carotid arteries. 2. No significant stenosis in the external carotid arteries  bilaterally. 3. Antegrade flow in both vertebral arteries. 4. Normal flow in both subclavian arteries. Plaque Morphology:  1. Calcified plaque in the bulb and right ICA. 2. Calcified plaque in the bulb and left ICA.     ADDITIONAL COMMENTS  No previous study available for comparison.   I have personally reviewed the patients radiographs and have reviewed the reports:

## 2018-05-09 NOTE — PROGRESS NOTES
Problem: Falls - Risk of  Goal: *Absence of Falls  Document Fatimah Fall Risk and appropriate interventions in the flowsheet.    Outcome: Progressing Towards Goal  Fall Risk Interventions:  Mobility Interventions: Communicate number of staff needed for ambulation/transfer    Mentation Interventions: Adequate sleep, hydration, pain control, Bed/chair exit alarm, Door open when patient unattended, Reorient patient, More frequent rounding    Medication Interventions: Bed/chair exit alarm    Elimination Interventions: Bed/chair exit alarm, Call light in reach, Toileting schedule/hourly rounds    History of Falls Interventions: Bed/chair exit alarm, Door open when patient unattended, Investigate reason for fall, Room close to nurse's station

## 2018-05-09 NOTE — ROUTINE PROCESS
0700: Bedside and Verbal shift change report given to [employer] (oncoming nurse) by Leanna Hodge RN   (offgoing nurse). Report included the following information SBAR, Kardex, Intake/Output, MAR, Recent Results and Cardiac Rhythm A Fib with BBB.

## 2018-05-09 NOTE — DIABETES MGMT
GLYCEMIC CONTROL SCREENING INITIATED:    Lab Results   Component Value Date/Time    Hemoglobin A1c 6.2 (H) 09/30/2017 12:14 AM      Lab Results   Component Value Date/Time    Glucose 157 (H) 05/09/2018 03:27 AM         [x]  Glucose values exceeding inpatient target range: -180mg/dl            non-ICU 70-140mg/dl      []  Patient meets criteria for pre-diabetes   HbA1c = 5.7-6.4%      [x]  Patient has h/o diabetes HbA1c ? 6.5%      []  Recommend discontinuing oral anti-diabetic medications until discharge. []  Recommend basal insulin per IP Insulin order set. []  Recommend meal time insulin per IP Insulin order set. [x]  Recommend corrective insulin per IP Insulin order set. [x]  Standard insulin scale  i[]  Very insuln resistant scale       []  Patient meets criteria for IV Insulin Infusion/GlucoStabilizer order set. []  Patient has had a hypoglycemic event, recommend      [x]  Recommend continue blood glucose monitoring. []  Patient will need prescription for glucometer, test strips and lancets. [x]  Recommend carbohydrate controlled diabetic diet. [x]  Diabetes Self-Management class schedule provided and patient encouraged to attend.     [] Other

## 2018-05-09 NOTE — PROGRESS NOTES
Bedside and Verbal shift change report given to A Donal RN (oncoming nurse) by Mliss Saint RN (offgoing nurse). Report included the following information SBAR, Kardex, ED Summary, Procedure Summary, Intake/Output, MAR and Accordion.

## 2018-05-10 PROBLEM — A41.9 SEPSIS (HCC): Status: ACTIVE | Noted: 2018-01-01

## 2018-05-10 NOTE — PROGRESS NOTES
Problem: Dysphagia (Adult)  Goal: *Acute Goals and Plan of Care (Insert Text)  Recommendations:  Diet: Mech-soft/Nectar-thick liquid   Meds: Per patient preference  Aspiration Precautions  Oral Care TID    Goals:  Patient will:  1. Tolerate PO trials with 0 s/s overt distress in 4/5 trials  2. Utilize compensatory swallow strategies/maneuvers (decrease bite/sip, size/rate, alt. liq/sol) with min cues in 4/5 trials  3. Perform oral-motor/laryngeal exercises to increase oropharyngeal swallow function with min cues  4. Complete an objective swallow study (i.e., MBSS) to assess swallow integrity, r/o aspiration, and determine of safest LRD, min A       Outcome: Progressing Towards Goal  Speech language pathology dysphagia treatment    Patient: Whitney Huddleston (29 y.o. male)  Date: 5/10/2018  Diagnosis: Uremia  MINERVA (acute kidney injury) ([facility] [address]  CHF (congestive heart failure) ([facility])  Dyspnea  Acute kidney injury ([facility] [address]  Troponin level elevated  Pulmonary hypertension (HCC) MINERVA (acute kidney injury) ([facility] [address]       Precautions: Aspiration    PLOF: Living with family     ASSESSMENT:  Followed up this day with dysphagia tx. Per RN, Norma, pt with minimal appetite. Pt A&O to self. Pt accepted thin liquid via cup sip with immediate throat clear and weak cough. Required prompts to use single sips. Aspiration s/s resolved with nectar-thick liquid trial. Puree and solid trials; pt with mildly delayed mastication and swallow initiation, 0 s/s of aspiration. Recommend mech-soft/nectar-thick liquid diet with aspiration precautions and MBS. Pt not appropriate for laryngeal strengthening exercises at this time due to impaired command following. ST will continue to follow as indicated.    Progression toward goals:  []         Improving appropriately and progressing toward goals  [x]         Improving slowly and progressing toward goals  []         Not making progress toward goals and plan of care will be adjusted PLAN:  Recommendations and Planned Interventions:  [facility]-soft, thin liquid  Patient continues to benefit from skilled intervention to address the above impairments. Continue treatment per established plan of care. Discharge Recommendations: To Be Determined     SUBJECTIVE:   Patient stated I'm confused. [place]? . OBJECTIVE:   Cognitive and Communication Status:  Neurologic State: Alert, Confused  Orientation Level: Disoriented to place, Disoriented to situation, Disoriented to time, Oriented to person  Cognition: Follows commands  Perception: Appears intact  Perseveration: No perseveration noted  Safety/Judgement: Decreased insight into deficits  Dysphagia Treatment:  Oral Assessment:  Oral Assessment  Labial: No impairment  Dentition: Natural, Intact  Oral Hygiene: Fair  Lingual: Decreased rate  Velum: No impairment  Mandible: No impairment  P.O. Trials:   Patient Position: HOB 60   Vocal quality prior to P.O.: No impairment   Consistency Presented: Thin liquid, Solid, Puree, Nectar thick liquid   How Presented: SLP-fed/presented, Straw, Cup/sip       Bolus Acceptance: No impairment   Bolus Formation/Control: Impaired   Type of Impairment: Delayed, Mastication   Propulsion: Delayed (# of seconds)   Oral Residue: None   Initiation of Swallow: Delayed (# of seconds)   Laryngeal Elevation: Functional   Aspiration Signs/Symptoms: Strong cough, Clear throat   Pharyngeal Phase Characteristics: Suspected pharyngeal residue   Effective Modifications: Small sips and bites, Alternate liquids/solids   Cues for Modifications:  Moderate         Oral Phase Severity: Mild   Pharyngeal Phase Severity : Moderate     PAIN:  Start of Tx: 0  End of Tx: 0     GCODESwallowing:  Swallow Current Status CK= 40-59%   Swallow Goal Status CI= 1-19%    The severity rating is based on the following outcomes:  GRIS Noms Swallow Level 4    Clinical Judgement    After treatment:   []              Patient left in no apparent distress sitting up in chair  [x]              Patient left in no apparent distress in bed  [x]              Call bell left within reach  [x]              Nursing notified  []              Family present  []              Caregiver present  []              Bed alarm activated      COMMUNICATION/EDUCATION:   [x] Aspiration precautions; swallow safety; compensatory techniques  []        Patient unable to participate in education; education ongoing with staff  [x]  Posted safety precautions in patient's room.   [] Oral-motor/laryngeal strengthening exercises      MOISES Camarena  Time Calculation: 19 mins

## 2018-05-10 NOTE — PROGRESS NOTES
Assessment:   MINERVA (acute kidney injury) ([facility] [address] (5/8/2018)      CHF (congestive heart failure) ([facility] [address] (5/8/2018)       Uremia (5/8/2018)       Dyspnea (5/8/2018)       Pulmonary hypertension ([facility] [address] (5/8/2018)       Troponin level elevated (5/8/2018)       Acute kidney injury ([facility] [address] (5/8/2018)        Hyponatremia, Hypervolemic  Aortic stenosis  Neurontin accumulation leading to toxicity  Biventricular heart failure  Pulmonary HTN    Plan;  Pt condition is not improving, he is at risk for uremic seizures and coma. I had called Mrs. Tatyana Fortune and informed her of grim prognosis. Wife had agreed to make pt comfort measure only   I will inform the nurse who will confirm this with the wife. I will sign off, please call me with any questions  Time spent 60 minutes              Blood pressure (!) 110/30, pulse 76, temperature 98.1 °F (36.7 °C), resp. rate 17, height 5' 11\" (1.803 m), weight 119.8 kg (264 lb 1.8 oz), SpO2 100 %.       Appear chronically ill, bearded no NAD  Neck: supple  Respiratory: no wheezings  Cardiovascular:  S1 S2, systolic murmur throughout chest  GI: soft, obese  Musculoskeletal: No clubbing cynosis, no petechia  Skin: lower extremity hyperpigmentation and woody skin with plus 2 edema  Neruoligcal: myoclonus,   Psychicatric:    anxiouse            Intake/Output Summary (Last 24 hours) at 05/10/18 1626  Last data filed at 05/09/18 2312   Gross per 24 hour   Intake             2260 ml   Output                0 ml   Net             2260 ml      Recent Labs      05/09/18   0327   WBC  21.0*     Lab Results   Component Value Date/Time    Sodium 135 (L) 05/10/2018 03:32 AM    Potassium 4.6 05/10/2018 03:32 AM    Chloride 101 05/10/2018 03:32 AM    CO2 26 05/10/2018 03:32 AM    Anion gap 8 05/10/2018 03:32 AM    Glucose 118 (H) 05/10/2018 03:32 AM     (H) 05/10/2018 03:32 AM    Creatinine 2.10 (H) 05/10/2018 03:32 AM    BUN/Creatinine ratio 52 (H) 05/10/2018 03:32 AM    GFR est AA 38 (L) 05/10/2018 03:32 AM    GFR est non-AA 32 (L) 05/10/2018 03:32 AM    Calcium 8.7 05/10/2018 03:32 AM        Current Facility-Administered Medications   Medication Dose Route Frequency Provider Last Rate Last Dose    insulin lispro (HUMALOG) injection   SubCUTAneous AC&HS Mio Rivas MD   Stopped at 05/10/18 0730    glucose chewable tablet 16 g  4 Tab Oral PRN Mio Rivas MD        glucagon (GLUCAGEN) injection 1 mg  1 mg IntraMUSCular PRN Mio Rivas MD        dextrose (D50W) injection syrg 12.5-25 g  25-50 mL IntraVENous PRN Mio Rivas MD        atorvastatin (LIPITOR) tablet 40 mg  40 mg Oral DAILY Gary Sorot DO   40 mg at 05/10/18 0666    vancomycin (VANCOCIN) 1500 mg in  ml infusion  1,500 mg IntraVENous Q24H Mio Rivas  mL/hr at 05/09/18 2223 1,500 mg at 05/09/18 2223    aspirin delayed-release tablet 81 mg  81 mg Oral DAILY Jennifer Ovalles MD   81 mg at 05/10/18 6462    carvedilol (COREG) tablet 3.125 mg  3.125 mg Oral BID WITH MEALS Jennifer Ovalles MD   3.125 mg at 05/10/18 0941    loratadine (CLARITIN) tablet 10 mg  10 mg Oral DAILY Jennifer Ovalles MD   10 mg at 05/10/18 5613    ferrous sulfate tablet 325 mg  325 mg Oral ACB Jennifer Ovalles MD   325 mg at 05/10/18 0735    acetaminophen (TYLENOL) tablet 650 mg  650 mg Oral Q4H PRN Jennifer Ovalles MD        levoFLOXacin (LEVAQUIN) 750 mg in D5W IVPB  750 mg IntraVENous Q48H Mio Rivas  mL/hr at 05/08/18 1830 750 mg at 05/08/18 1830    Vancomycin-Rx to Dose & Monitor  1 Each Other Rx Dosing/Monitoring Mio Rivas MD        ceFAZolin (ANCEF) 1 g in sterile water (preservative free) 10 mL IV syringe  1 g IntraVENous Q8H Deisy Parker MD   1 g at 05/10/18 0735    acetaminophen (TYLENOL) suppository 650 mg  650 mg Rectal Q6H PRN Deisy Parker MD   650 mg at 05/08/18 1829    morphine (ROXANOL) 100 mg/5 mL (20 mg/mL) concentrated solution 10 mg  10 mg SubLINGual Q3H PRN Kathi Tang MD   10 mg at 05/08/18 2001    LORazepam (ATIVAN) injection 0.5 mg  0.5 mg IntraVENous Q6H PRN Kathi Tang MD   0.5 mg at 05/10/18 0158       )Us Retroperitoneum Comp    Result Date: 5/8/2018  Renal ultrasound History: Chronic renal failure Comparison: No prior studies Technique: Multiple gray scale sonographic images of the kidneys were obtained. Additional color Doppler and the Doppler waveform images were obtained . Findings: The right kidney is normal in echotexture and appearance. 8 x 7 x 9 mm echogenic shadowing focus is present along the lower pole. The right kidney measures approximately 12.7 cm in length. There is no evidence of hydronephrosis. The left kidney is normal in echotexture and appearance. Focal echogenic shadowing area measuring 2.9 x 1.7 x 0.8 cm is seen along the interpolar region. Additional small lower pole anechoic 2.0 cm cystic-appearing lesion is present. The left kidney measures approximately 12.0 cm in length. There is no evidence of hydronephrosis. The bladder is identified. Ureteral jets are not seen. Bladder is incompletely distended with estimated volume of 84 mL's. Mild diffuse bladder wall thickening is present estimated 6 mm. No discrete bladder lesion is seen. Patient unable to void for assessment of potential postvoid residual.     IMPRESSION: 1. No evidence of hydronephrosis. 2. Echogenic shadowing foci bilaterally suggesting nonobstructive renal calculi. 3. Additional left renal cyst.    Xr Chest Port    Result Date: 5/8/2018  EXAM:Chest X-Ray  History: Respiratory distress Technique:  Portable Frontal View Comparison: 05/08/2018, 09/29/2017  FINDINGS: Cardiomegaly with findings of bronchovascular congestion and cephalization. Postoperative changes of previous aortic valve repair. Mild diffuse increased hazy interstitial opacities with areas of patchy confluence at both lung bases. No pneumothorax or effusion.  Stable osseous structures with previous total right humeral arthroplasty. IMPRESSION: 1. Cardiomegaly with findings of CHF versus fluid overload and developing edema. Xr Chest Port    Result Date: 5/8/2018  PORTABLE CHEST AT 0234 HOURS: Indication:  Weakness. Technique:  Portable, erect AP view. Comparison:  09/29/2017. CT chest 09/30/2017. Findings:  Lungs are adequately expanded without focal consolidation, pulmonary edema or pneumothorax. Cardiac silhouette is moderately to markedly enlarged, stable. Tortuous thoracic aorta. Degenerative change around the visualized left shoulder. Right proximal humeral arthroplasty, incompletely imaged. IMPRESSION: No acute cardiopulmonary disease. Stable moderate to marked enlargement of the cardiac silhouette, cardiomegaly and/or pericardial effusion.

## 2018-05-10 NOTE — CDMP QUERY
Please clarify if this patient is being treated/managed for:    => Severe Sepsis POA in the setting of bacteremia, possible infective endocarditis, with associated organ failure of metabolic encephalopathy and MINERVA and possible septic shock  =>Other Explanation of clinical findings  =>Unable to Determine (no explanation of clinical findings)    The medical record reflects the following:    Risk: significant CAD, recent AV replacement with complications post-op    Clinical Indicators: Patient presented with weakness and AM found to have MINERVA and encephalopathy. VS on admission 5/8/18:  afebrile, P 92 RR 30    WBC 16.2 93% neuts, BC  +GPR  Repeat labs 6 hours later showed WBC's 26.4 83% neuts, 4% bands  5/9 T max 102.9 P 105 RR 30 BP dropped to 60-80's/20-50's, RRT was called for drop in BP.  WBC's 21, 70% neuts, 12% bands  The family did not wish to escalate care. Cardiology consult on 5/9 indicated a concern for possible endocarditis  Renal consult on 5/9 indicated \"Pt is in cardiorenal syndrome\"    Treatment:  BC, IV Vanco, levaquin, NS boluses to maintain BP, Renal consult, Pulmonary consult, Cardiology consult,     Please clarify and document your clinical opinion in the progress notes and discharge summary including the definitive and/or presumptive diagnosis, (suspected or probable), related to the above clinical findings. Please include clinical findings supporting your diagnosis. If you DECLINE this query or would like to communicate with [facility], please utilize the \"[facility] message box\" at the TOP of the Progress Note on the right.       Thank you,  Alayna Madrid [address] [place] [address], [address] [place] [address] Hector Ne

## 2018-05-10 NOTE — ROUTINE PROCESS
Bedside and Verbal shift change report given to . Leandra Villafuerte, (oncoming nurse) by ELTON Grey, JACINTO (offgoing nurse). Report included the following information SBAR, Kardex, Intake/Output, MAR and Recent Results. Alarm parameters reviewed, on and audible Appropriate for patient clinical condition.

## 2018-05-10 NOTE — PROGRESS NOTES
Problem: Falls - Risk of  Goal: *Absence of Falls  Document Fatimah Fall Risk and appropriate interventions in the flowsheet. Outcome: Progressing Towards Goal  Fall Risk Interventions:  Mobility Interventions: Assess mobility with egress test    Mentation Interventions: Adequate sleep, hydration, pain control    Medication Interventions: Bed/chair exit alarm    Elimination Interventions: Bed/chair exit alarm, Toilet paper/wipes in reach, Elevated toilet seat    History of Falls Interventions: Bed/chair exit alarm        Problem: Pressure Injury - Risk of  Goal: *Prevention of pressure injury  Document Angel Scale and appropriate interventions in the flowsheet.    Outcome: Progressing Towards Goal  Pressure Injury Interventions:  Sensory Interventions: Assess need for specialty bed    Moisture Interventions: Absorbent underpads    Activity Interventions: Increase time out of bed, Pressure redistribution bed/mattress(bed type), PT/OT evaluation    Mobility Interventions: HOB 30 degrees or less, Pressure redistribution bed/mattress (bed type), PT/OT evaluation    Nutrition Interventions: Document food/fluid/supplement intake    Friction and Shear Interventions: Apply protective barrier, creams and emollients, HOB 30 degrees or less

## 2018-05-10 NOTE — PROGRESS NOTES
Cardiology Progress Note        Patient: Jagruti Hardwick        Sex: male          DOA: 5/8/2018  YOB: 1950      Age:  80 y.o.        LOS:  LOS: 2 days   Assessment/Plan     Principal Problem:    MINERVA (acute kidney injury) ([facility] [address] (5/8/2018)    Active Problems:    Uremia (5/8/2018)      Dyspnea (5/8/2018)      Pulmonary hypertension ([facility] [address] (5/8/2018)      Troponin level elevated (5/8/2018)      Acute kidney injury ([facility] [address] (5/8/2018)      Sepsis ([facility] [address] (5/10/2018)        Plan:  Confused  Wife already made him comfort care  I will sign off and please call if any questions/concerned                     Subjective:    cc:  Elevated trop        REVIEW OF SYSTEMS:     General: + fevers or chills. Cardiovascular: No chest pain or pressure. No palpitations. ++ankle swelling  Pulmonary: + SOB, orthopnea, PND  Gastrointestinal: + nausea, vomiting or diarrhea      Objective:      Visit Vitals    /49    Pulse 76    Temp 98.1 °F (36.7 °C)    Resp 17    Ht 5' 11\" (1.803 m)    Wt 119.8 kg (264 lb 1.8 oz)    SpO2 100%    BMI 36.84 kg/m2     Body mass index is 36.84 kg/(m^2). Physical Exam:  General Appearance: mildly distressed  NECK: No JVD, no thyroidomeglay. LUNGS:  Bilaterally positive. HEART: H1+P3 systolic murmur    ABD: Non-tender, BS Audible    EXT: ++ edema, and no cysnosis. VASCULAR EXAM: Pulses are intact.           Medication:  Current Facility-Administered Medications   Medication Dose Route Frequency    phenytoin ER (DILANTIN ER) ER capsule 100 mg  100 mg Oral TID    LORazepam (ATIVAN) tablet 1 mg  1 mg Oral BID    insulin lispro (HUMALOG) injection   SubCUTAneous AC&HS    glucose chewable tablet 16 g  4 Tab Oral PRN    glucagon (GLUCAGEN) injection 1 mg  1 mg IntraMUSCular PRN    dextrose (D50W) injection syrg 12.5-25 g  25-50 mL IntraVENous PRN    acetaminophen (TYLENOL) tablet 650 mg  650 mg Oral Q4H PRN    acetaminophen (TYLENOL) suppository 650 mg 650 mg Rectal Q6H PRN    morphine (ROXANOL) 100 mg/5 mL (20 mg/mL) concentrated solution 10 mg  10 mg SubLINGual Q3H PRN    LORazepam (ATIVAN) injection 0.5 mg  0.5 mg IntraVENous Q6H PRN               Lab/Data Reviewed:  Procedures/imaging: see electronic medical records for all procedures/Xrays   and details which were not copied into this note but were reviewed prior to creation of Plan       All lab results for the last 24 hours reviewed.      Recent Labs      05/09/18   0327  05/08/18   0806  05/08/18   0230   WBC  21.0*  26.4*  16.2*   HGB  11.2*  11.9*  13.3   HCT  34.1*  35.7*  39.7   PLT  103*  147  154     Recent Labs      05/10/18   0332  05/09/18   0327  05/08/18   0230   NA  135*  135*  133*   K  4.6  4.4  5.0   CL  101  100  94*   CO2  26  24  28   GLU  118*  157*  145*   BUN  109*  111*  109*   CREA  2.10*  2.43*  2.55*   CA  8.7  8.2*  9.2       Signed By: Jose Alfredo Kim MD     May 10, 2018

## 2018-05-10 NOTE — PROGRESS NOTES
Thuy Mondragon M.D  [address] [facility] [address]  Phone [phone]   Fax [phone]    Pulmonary Critical Care & Sleep Medicine           IMPRESSION:   · Admitted with AMS found to have GPR in blood and sepsis-- Diphtheroids most likely due to skin there is no clear evidence of penumonia  · CAD- multivessel disease : Cardiac Cath 9/28/17  · Pulmonary hypertension with pulmonary vascular resistance of 4.425 Wood units consistent with the combination etiology of left ventricular dysfunction with left ventricular end-diastolic pressure of 31 mmHg (Class 2), and sleep apnea and lung disease (class 3)?  CTEPH (class 4) - Pulmonary capillary wedge pressure mean was 33 mmHg  · Acute renal failure   · Chronic Atrial fibrillation-- was on coumadin on hold due to elevated INR   · Aortic stenosis S/P TVAER  · Dilated cadiomyopathy  · Chronic anticoagulation- history of DVT: Chronic bilateral non- occlusive DVT : Doppler 9/29/17  · HTN  · Morbid Obesity  · MICHAEL- Patient reports diagnosed 15 years ago- CPAP intolerant-- Use if able to tolerate and respiratory distress   · Distant pipe smoker- quit 30 years ago  · Moderate to severe-restrictive lung disease with possible small airway disease- PFTs 10/2/17  · DNR status       RECOMMENDATIONS:  Diphtheroids in blood consider repeat culture as there is concern of endocarditis  Consider changing cefazolin to unasyn and adjsut with sensitivity  Taper oxygen as tolerated  CAD and PHT managemetn per cardiology  Taper oxygen as tolerated  Keep in negative balance  As outlined below pulmonary HTN management   Monitor for aspiration  Once able to recover will benefit from re evalaution of Pulmonary HTN post TVAR  Again looks like patient has severe CAD and overall extremly poor prognosis and family does not wish any heroic measures  Nothing much to add from pulmonary point of view will sign off please call if things change   · Pulmonary hypertension- Etiology most likely multifactorial Class 2,3 and possible 4. Would avoid phosphodiesterase due to cardiac status- especially aortic stenosis. · Start bronchodilators and monitor for response  · Continue oxygen  · Sign off  · I spent 35 min of time excluding procedure, with face to face evaluation  >50% on complex decision making, coordination of care and counseling patient. Subjective/History:  5/10/18  Lying in bed arousable withot any distress  Denies any new complain   Blood culture is Diphtheroids  Echo with ? vegetation     The patient is a 46-year-old gentleman who I know from previous 1 visit with me in [facility] 5 in  for aortic valve stenosis and subsequently he underwent right and left heart catheterization and then he was referred to surgeon for multivessel bypass surgery along with aortic valve replacement. Then the patient was a poor candidate for surgical intervention, subsequently referred for transcatheter aortic valve replacement. He underwent aortic valve replacement, but during the recovery his femoral access complicated associated with bleeding, leading to multiple bleeding issues and slow recovery with frequent infection. Then in subsequent followup with the cardiologist in [facility] [place], the patient was recommended not to undergo for any kind of revascularization for his complex coronary artery disease. The patient overall has waxing and waning complicated course in the last 6 months, and recently THE PATIENT HAS CONVERTED INTO A DNR STATUS. The patient had multiple rehab admissions along with home health aide assistance as well. He is a distant smoker of tobacco (pipe quite 30+ years ago) and Yolanda Huff (quit 1970's), Denies any other drug use.      He is also having loss of urine and stool control  With worsening confusion since being Discharge from [facility] post TAVR and femoral artery cut down   Patient has dyspnea with minimal exertion Recently had bumex adjusted /cut back due to worsening creatine   Creatine last month 1.03 now 2. 55 with relative hyperkalemia. I was called to assist in management   Blood culture positive GPR   CXR ok   Labs and abg reviewed     Past Medical History:   Diagnosis Date    Aortic stenosis 9/29/2017    Bilateral artificial lens implant 1997, 1999    Chronic anticoagulation 9/29/2017    Chronic atrial fibrillation ([address] 9/29/2017    Coronal hypospadias 9/29/2017    Coronary artery disease involving native coronary artery of native heart without angina pectoris 9/28/2017    Diabetes mellitus with no complication ([address] 8/80/2152    Last Assessment & Plan:  No apparent diabetic retinopathy. . Pt told to keep regular primary care and eye appointments to reduce the risk of visual loss from diabetic eye complications, and to follow up immediately for changes in vision.  Diabetic retinopathy associated with type 2 diabetes mellitus ([address] 9/29/2017    Fluid overload     Gout 9/29/2017    Lesion of soft palate 7/29/2017    ENT planned biopsy    Pseudophakia of both eyes 9/29/2017    Last Assessment & Plan:  Vision stable. IOL's well centered. Pt pleased with visual result post-op OD 1999 and OS 1997. To call if any visual changes.  Retinal hole or tear 6/12/2017    Last Assessment & Plan:  [facility] well surrounded. Stable. Will follow    Tricuspid regurgitation 9/29/2017      Past Surgical History:   Procedure Laterality Date    HX CATARACT REMOVAL Bilateral 1997, 1999    b/l lens implant    HX CHOLECYSTECTOMY      HX KNEE REPLACEMENT      HX SHOULDER REPLACEMENT        Prior to Admission medications    Medication Sig Start Date End Date Taking? Authorizing Provider   spironolactone (ALDACTONE) 25 mg tablet Take 25 mg by mouth two (2) times a day. Yes Phys Other, MD   bumetanide (BUMEX) 2 mg tablet Take 2 mg by mouth two (2) times a day.  Indications: takes a total of 3 mg a day   Yes Phys Other, MD   tamsulosin (FLOMAX) 0.4 mg capsule Take 0.4 mg by mouth daily. Yes Navdeep Willoughby MD   aspirin delayed-release 81 mg tablet Take 1 Tab by mouth daily. 10/6/17  Yes Brigitte Uribe MD   carvedilol (COREG) 12.5 mg tablet Take 1 Tab by mouth two (2) times daily (with meals). Patient taking differently: Take 3.125 mg by mouth two (2) times daily (with meals). 10/6/17  Yes Brigitte Uribe MD   pravastatin (PRAVACHOL) 40 mg tablet Take 1 Tab by mouth nightly. 10/6/17  Yes Brigitte Uribe MD   gabapentin (NEURONTIN) 600 mg tablet Take 600 mg by mouth three (3) times daily. Yes Historical Provider   ferrous sulfate 325 mg (65 mg iron) tablet Take 325 mg by mouth Daily (before breakfast). Navdeep Willoughby MD   tiotropium (SPIRIVA) 18 mcg inhalation capsule Take 1 Cap by inhalation daily. 10/6/17   Brigitte Uribe MD   traMADol Ceclia Myron) 50 mg tablet Take 1 Tab by mouth every six (6) hours as needed. Max Daily Amount: 200 mg. Patient taking differently: Take 50 mg by mouth every eight (8) hours as needed. 10/6/17   Brigitte Uribe MD   cetirizine (ZYRTEC) 10 mg tablet Take 10 mg by mouth daily as needed. Historical Provider   warfarin (COUMADIN) 5 mg tablet Take 7.5 mg by mouth daily.  Every day except wednesday    Historical Provider     Current Facility-Administered Medications   Medication Dose Route Frequency    insulin lispro (HUMALOG) injection   SubCUTAneous AC&HS    atorvastatin (LIPITOR) tablet 40 mg  40 mg Oral DAILY    vancomycin (VANCOCIN) 1500 mg in  ml infusion  1,500 mg IntraVENous Q24H    aspirin delayed-release tablet 81 mg  81 mg Oral DAILY    carvedilol (COREG) tablet 3.125 mg  3.125 mg Oral BID WITH MEALS    loratadine (CLARITIN) tablet 10 mg  10 mg Oral DAILY    ferrous sulfate tablet 325 mg  325 mg Oral ACB    levoFLOXacin (LEVAQUIN) 750 mg in D5W IVPB  750 mg IntraVENous Q48H    Vancomycin-Rx to Dose & Monitor  1 Each Other Rx Dosing/Monitoring    ceFAZolin (ANCEF) 1 g in sterile water (preservative free) 10 mL IV syringe  1 g IntraVENous Q8H     No Known Allergies   Social History   Substance Use Topics    Smoking status: Former Smoker    Smokeless tobacco: Never Used    Alcohol use Yes      Family History   Problem Relation Age of Onset    Family history unknown: Yes        Review of Systems:  A comprehensive review of systems was negative except for that written in the HPI. Objective:   Vital Signs:    Visit Vitals    /54    Pulse 75    Temp 98.5 °F (36.9 °C)    Resp 17    Ht 5' 11\" (1.803 m)    Wt 119.8 kg (264 lb 1.8 oz)    SpO2 97%    BMI 36.84 kg/m2       O2 Device: Nasal cannula   O2 Flow Rate (L/min): 3 l/min   Temp (24hrs), Av.1 °F (36.7 °C), Min:97.9 °F (36.6 °C), Max:98.5 °F (36.9 °C)       Intake/Output:   Last shift:         Last 3 shifts:  1901 - 05/10 0700  In: 3076.3 [P.O.:2260; I.V.:816.3]  Out: -     Intake/Output Summary (Last 24 hours) at 05/10/18 1436  Last data filed at 18 2312   Gross per 24 hour   Intake             2260 ml   Output                0 ml   Net             2260 ml       Physical Exam:    General:  Alert, minimally responsive    Head:  Normocephalic, without obvious abnormality, atraumatic. Eyes:  Conjunctivae/corneas clear. PERRL, EOMs intact. Nose: Nares normal. Septum midline. Mucosa normal. No drainage or sinus tenderness. Throat: Lips, mucosa- normal, Macroglossia, Mallampati:5- unable to view fully posterior oropharynx. Neck: Supple, symmetrical, trachea midline, no adenopathy, thyroid: no enlargment/tenderness/nodules no JVD. Back:   Symmetric, no curvature. ROM normal.   Lungs:   Clear to auscultation bilaterally. Chest wall:  No tenderness or deformity. Heart:  Regular rate and rhythm, S1, S2 normal, + loud  murmur,  No click, rub or gallop. Abdomen:   Obese Soft, non-tender.  Bowel sounds normal. No masses,  No organomegaly- palpated   Extremities: Extremities normal, atraumatic, no cyanosis or edema. - wearing compression stockings   Pulses: 2+ and symmetric all extremities. Skin: Skin color, texture, turgor normal. No rashes or lesions   Lymph nodes:    Cervical, supraclavicular, and nodes normal.   Neurologic: Grossly nonfocal       Data:     CBC w/Diff Recent Labs      05/09/18 0327  05/08/18   0806  05/08/18   0230   WBC  21.0*  26.4*  16.2*   RBC  3.73*  3.93*  4.36*   HGB  11.2*  11.9*  13.3   HCT  34.1*  35.7*  39.7   PLT  103*  147  154   GRANS  70  83*  93*   LYMPH  10*  8*  4*   EOS  0  0  0        Chemistry Recent Labs      05/10/18   0555  05/10/18   0332  05/09/18   0327  05/08/18   0230   GLU   --   118*  157*  145*   NA   --   135*  135*  133*   K   --   4.6  4.4  5.0   CL   --   101  100  94*   CO2   --   26  24  28   BUN   --   109*  111*  109*   CREA   --   2.10*  2.43*  2.55*   CA   --   8.7  8.2*  9.2   MG  2.7*   --   2.3   --    AGAP   --   8  11  11   BUCR   --   52*  46*  43*   AP   --    --    --   102   TP   --    --    --   6.7   ALB   --    --    --   2.6*   GLOB   --    --    --   4.1*   AGRAT   --    --    --   0.6*        Lactic Acid No results found for: LAC  No results for input(s): LAC in the last 72 hours.      Micro  Recent Labs      05/08/18 1806 05/08/18 1735 05/08/18   0354   CULT  AEROBIC AND ANAEROBIC BOTTLES DIPHTHEROIDS*  AEROBIC AND ANAEROBIC BOTTLES DIPHTHEROIDS*  NO GROWTH 2 DAYS     Recent Labs      05/08/18 1806 05/08/18   1735  05/08/18   0354   CULT  AEROBIC AND ANAEROBIC BOTTLES DIPHTHEROIDS*  AEROBIC AND ANAEROBIC BOTTLES DIPHTHEROIDS*  NO GROWTH 2 DAYS        ABG Recent Labs      05/08/18   1723  05/08/18   0439   PHI  7.527*  7.493*   PCO2I  30.9*  32.3*   PO2I  88  89   HCO3I  25.6  24.8   FIO2I   --   0.28        Liver Enzymes Protein, total   Date Value Ref Range Status   05/08/2018 6.7 6.4 - 8.2 g/dL Final     Albumin   Date Value Ref Range Status   05/08/2018 2.6 (L) 3.4 - 5.0 g/dL Final     Globulin   Date Value Ref Range Status 05/08/2018 4.1 (H) 2.0 - 4.0 g/dL Final     A-G Ratio   Date Value Ref Range Status   05/08/2018 0.6 (L) 0.8 - 1.7   Final     AST (SGOT)   Date Value Ref Range Status   05/08/2018 53 (H) 15 - 37 U/L Final     Alk. phosphatase   Date Value Ref Range Status   05/08/2018 102 45 - 117 U/L Final     Recent Labs      05/08/18   0230   TP  6.7   ALB  2.6*   GLOB  4.1*   AGRAT  0.6*   SGOT  53*   AP  102        Cardiac Enzymes No results found for: CPK, CK, CKMMB, CKMB, RCK3, CKMBT, CKNDX, CKND1, ANA, TROPT, TROIQ, YUDITH, TROPT, TNIPOC, BNP, BNPP     BNP No results found for: BNP, BNPP, XBNPT     Coagulation Recent Labs      05/10/18   0332  05/09/18   0917  05/09/18   0327  05/08/18   2345  05/08/18   1339  05/08/18   0806   PTP  51.1*   --   48.9*   --    --   43.7*   INR  5.8*   --   5.5*   --    --   4.7*   APTT   --   >180.0*   --   >180.0*  >180.0*   --          Thyroid  No results found for: T4, T3U, TSH, TSHEXT, TSHEXT       Lipid Panel No results found for: CHOL, CHOLPOCT, CHOLX, CHLST, CHOLV, 585503, HDL, LDL, LDLC, DLDLP, 464733, VLDLC, VLDL, TGLX, TRIGL, TRIGP, TGLPOCT, CHHD, CHHDX       Urinalysis Lab Results   Component Value Date/Time    Color YELLOW 05/08/2018 03:54 AM    Appearance CLEAR 05/08/2018 03:54 AM    Specific gravity 1.015 05/08/2018 03:54 AM    pH (UA) 5.0 05/08/2018 03:54 AM    Protein NEGATIVE  05/08/2018 03:54 AM    Glucose NEGATIVE  05/08/2018 03:54 AM    Ketone NEGATIVE  05/08/2018 03:54 AM    Bilirubin NEGATIVE  05/08/2018 03:54 AM    Urobilinogen 1.0 05/08/2018 03:54 AM    Nitrites NEGATIVE  05/08/2018 03:54 AM    Leukocyte Esterase TRACE (A) 05/08/2018 03:54 AM    Epithelial cells 1+ 05/08/2018 03:54 AM    Bacteria NEGATIVE  05/08/2018 03:54 AM    WBC 3 to 5 05/08/2018 03:54 AM    RBC NEGATIVE  05/08/2018 03:54 AM        XR (Most Recent).  CXR reviewed by me and compared with previous CXR   Results from [place] encounter on 05/08/18   XR CHEST PORT   Narrative EXAM:Chest X-Ray History: Respiratory distress    Technique:  Portable Frontal View    Comparison: 05/08/2018, 09/29/2017    _______________    FINDINGS:  Cardiomegaly with findings of bronchovascular congestion and cephalization. Postoperative changes of previous aortic valve repair. Mild diffuse increased hazy interstitial opacities with areas of patchy  confluence at both lung bases. No pneumothorax or effusion. Stable osseous structures with previous total right humeral arthroplasty. _______________         Impression IMPRESSION:    1. Cardiomegaly with findings of CHF versus fluid overload and developing edema. CT (Most Recent)   Results from Hospital Encounter encounter on 09/29/17   CT CHEST W CONT   Narrative CT Chest With Contrast    CPT CODE: 16310    INDICATION: Severe coronary artery disease, severe pulmonary hypertension,  aortic stenosis, mitral regurgitation, Aortic dz, non-traumatic, known or  suspect. Evaluate size of thoracic aorta and extent of atherosclerotic  calcification    COMPARISON: Chest x-ray 9/29/2017 and 6/3/2010. TECHNIQUE: Standard helical images were obtained from the thoracic inlet through  the adrenals at 5 mm thick sections following the intravenous injection of a  bolus of 100 cc nonionic contrast.  All CT scans at this facility are performed  using dose optimization technique as appropriate to a performed exam, to include  automated exposure control, adjustment of the mA and/or kV according to  patient's size (including appropriate matching for site-specific examinations),  or use of iterative reconstruction technique. Images were reviewed on both soft tissue, lung, and bone window settings. Coronal and sagittal reformations obtained. FINDINGS:    Thoracic aorta:  Ascending aorta proximally 3.8 cm. Calcified valve. Ascending aorta at the level of the main pulmonary artery-4.9 x 5.3 cm.   Aortic arch-proximal 4 centimeter, Distal 3.2 cm  Proximal descending aorta 3. 2 cm. Descending aorta at the main pulmonary artery-2.6 x 3.3 cm  Distal descending aorta 2.9 cm. Scattered minimal plaque in the thoracic aorta. Calcified plaque coronary arteries. Main pulmonary artery-5.5 cm  Right main pulmonary artery 3.6 cm. Left main pulmonary artery 2.8 cm. Severe calcified plaque within the celiac axis and splenic artery. Scattered  plaque abdominal aorta and bilateral renal arteries as well as superior  mesenteric artery. The lungs are well inflated. There is no infiltrate . Calcified granuloma or small calcified pleural plaque left upper lobe ventrally. Calcified granuloma or calcified pleural plaque superior segment right lower  lobe medially. There is no evidence of mediastinal, hilar, nor axillary adenopathy. Small pericardial effusion measures 1.2 cm at the level of the left ventricle. There are no pleural effusions. There is no pericardial effusion. The included portion of the thyroid gland . The soft tissues of the chest wall are unremarkable. The bony structures are unremarkable. The included portion of the of the liver is unremarkable. The adrenal glands  are normal. 1.5 cm left intrarenal mid pole nonobstructing calculus. Impression IMPRESSION:    Fusiform dilatation of the ascending aorta with maximum diameter of 5.3 cm. Scattered minimal plaque in the thoracic aorta. Pulmonary arterial enlargement with the main pulmonary artery measuring 5.5 cm. Small pericardial effusion. A bilateral solitary calcified granuloma vs. a calcified pleural plaque. Nonobstructing left intrarenal calculus         EKG No results found for this or any previous visit. ECHO No results found for this or any previous visit. PFT No flowsheet data found.      Other ASA reactivity:   Pre-albumin:   Ionized Calcium:   NH4:   T3, FT4:  Cortisol:  Urine Osm:  Urine Lytes:   HbA1c:        Echo: 10/2/17       SUMMARY:  Left ventricle: Patient is irregular rhythm throughout exam, determining   ejection fraction and SKYLAR is technically  difficult. The ventricle was dilated. Systolic function was mildly reduced by   EF (biplane method of disks). Ejection  fraction was estimated in the range of 45 % to 50 %. There was mild diffuse   hypokinesis with regional variations. Wall  thickness was markedly increased. Right ventricle: The ventricle was dilated. Systolic function was reduced. Systolic pressure was markedly increased. Estimated peak pressure was at least 69 mmHg. Left atrium: The atrium was severely dilated. Right atrium: The atrium was severely dilated. Aortic valve: Leaflets exhibited moderately increased thickness,   calcification, and reduced mobility. There was  moderate stenosis. Valve mean gradient was 35 mmHg. Estimated aortic valve   area (by VTI) was 1.2 cm-sq. Tricuspid valve: There was mild regurgitation. Aorta, systemic arteries: The root exhibited mild dilatation. INDICATIONS: Shortness of breath. Pulmonary Function Studies: 10/2/17  Moderate restrictive ventilatory defect, Reduced diffusion capacity indicating a decrease in alveolar surface area for gas exchange, flow volume loop suggestive of small airways obstruction    Right Heart Cath: 9/28/17    RIGHT HEART HEMODYNAMICS:  1. Pulmonary wedge pressure mean was 33 mmHg, A-wave was 39  and V-wave was 43 mmHg. 2. PA pressure 88/47 with a mean PA pressure of 63 mmHg. 3. RV 85/13.  4. Right atrial mean pressure is 30 mmHg, A-wave was 40 and V-wave  was 32 mmHg. Imaging:  CT-Chest: 9/30/17: IMPRESSION:     Fusiform dilatation of the ascending aorta with maximum diameter of 5.3 cm. Scattered minimal plaque in the thoracic aorta. Pulmonary arterial enlargement with the main pulmonary artery measuring 5.5 cm. Small pericardial effusion. A bilateral solitary calcified granuloma vs. a calcified pleural plaque.   Nonobstructing left intrarenal calculus     10/2/17  Left Subclavian: Normal     INTERPRETATION/FINDINGS  Duplex images were obtained using 2-D gray scale, color flow, and  spectral Doppler analysis. 1. Bilateral <50% stenosis of the internal carotid arteries. 2. No significant stenosis in the external carotid arteries  bilaterally. 3. Antegrade flow in both vertebral arteries. 4. Normal flow in both subclavian arteries. Plaque Morphology:  1. Calcified plaque in the bulb and right ICA. 2. Calcified plaque in the bulb and left ICA.     ADDITIONAL COMMENTS  No previous study available for comparison.   I have personally reviewed the patients radiographs and have reviewed the reports:

## 2018-05-10 NOTE — WOUND CARE
Patient seen during hospital wide pressure injury prevalence. Skin intact, patient repositions self in bed with assistance. Spoke with patient concerning pressure relieving strategies. Wound care will continue to monitor during admission. Patient noted to have skin tear to right elbow measuring 2 x 0.7 x 0.1 with small amount of serosanginous drainage and no odor or redness. Applied bacitracin and Mepilex border. Redness to groin and abdominal folds noted. Blood blister present on 2nd metatarsal-closed with no drainage.

## 2018-05-10 NOTE — ACP (ADVANCE CARE PLANNING)
No Advance Directive in EMR. Patient has frequent periods of confusion so unable to complete AMD.    Wife states he has AMD forms but refused to complete them when he was mentally capable of doing so. Wife states that Mr. Jorge Luis Bejarano signed a Durable Do Not Resuscitate while hospitalized at [facility]. She will provide copy. Patient is currently DNR/DNI. They want to continue current level of care but would limit escalation of care and would like to avoid ICU. Not interested in long term tube feedings but may consider short term if swallowing continues to be an issue. Not ready for comfort care but would consider that if patient continues to decline. Will discuss POST with wife and complete if she is agreeable.     Freddy Hoskins RN

## 2018-05-10 NOTE — ROUTINE PROCESS
Bedside and Verbal shift change report given to ELTON Reynoso RN (oncoming nurse) by Eugene Gudino RN (offgoing nurse). Report included the following information SBAR, Kardex, Intake/Output, MAR and Recent Results.

## 2018-05-10 NOTE — PROGRESS NOTES
Problem: Falls - Risk of  Goal: *Absence of Falls  Document Fatimah Fall Risk and appropriate interventions in the flowsheet. Outcome: Progressing Towards Goal  Fall Risk Interventions:  Mobility Interventions: Assess mobility with egress test, Communicate number of staff needed for ambulation/transfer, OT consult for ADLs, Patient to call before getting OOB, PT Consult for mobility concerns, PT Consult for assist device competence, Strengthening exercises (ROM-active/passive), Utilize walker, cane, or other assitive device    Mentation Interventions: Adequate sleep, hydration, pain control, Bed/chair exit alarm, Door open when patient unattended, Evaluate medications/consider consulting pharmacy, Eyeglasses and hearing aids, Increase mobility, More frequent rounding, Reorient patient, Toileting rounds, Update white board    Medication Interventions: Bed/chair exit alarm, Evaluate medications/consider consulting pharmacy, Patient to call before getting OOB, Teach patient to arise slowly    Elimination Interventions: Bed/chair exit alarm, Call light in reach, Patient to call for help with toileting needs, Toilet paper/wipes in reach, Toileting schedule/hourly rounds    History of Falls Interventions: Bed/chair exit alarm, Consult care management for discharge planning, Door open when patient unattended, Evaluate medications/consider consulting pharmacy, Room close to nurse's station, Investigate reason for fall        Problem: Pressure Injury - Risk of  Goal: *Prevention of pressure injury  Document Angel Scale and appropriate interventions in the flowsheet.    Pressure Injury Interventions:  Sensory Interventions: Assess changes in LOC, Avoid rigorous massage over bony prominences, Check visual cues for pain, Discuss PT/OT consult with provider, Keep linens dry and wrinkle-free, Maintain/enhance activity level, Minimize linen layers, Monitor skin under medical devices, Pad between skin to skin, Pressure redistribution bed/mattress (bed type), Turn and reposition approx. every two hours (pillows and wedges if needed)    Moisture Interventions: Absorbent underpads, Apply protective barrier, creams and emollients, Check for incontinence Q2 hours and as needed, Maintain skin hydration (lotion/cream), Minimize layers, Moisture barrier    Activity Interventions: Increase time out of bed, Pressure redistribution bed/mattress(bed type), PT/OT evaluation    Mobility Interventions: HOB 30 degrees or less, Pressure redistribution bed/mattress (bed type), Turn and reposition approx.  every two hours(pillow and wedges), PT/OT evaluation    Nutrition Interventions: Document food/fluid/supplement intake, Offer support with meals,snacks and hydration    Friction and Shear Interventions: Apply protective barrier, creams and emollients, HOB 30 degrees or less, Lift sheet, Sit at 90-degree angle, Transferring/repositioning devices, Minimize layers

## 2018-05-10 NOTE — PROGRESS NOTES
Hospitalist Progress Note    Patient: Rosia Najjar MRN: [id number]  CSN: [id number]    YOB: 1950  Age: 80 y.o. Sex: male    DOA: 5/8/2018 LOS:  LOS: 2 days                Assessment/Plan     Patient Active Problem List   Diagnosis Code    Coronary artery disease involving native coronary artery of native heart without angina pectoris I25.10    Diabetes mellitus with no complication ([facility]) B92.7    Pseudophakia of both eyes Z96.1    Retinal hole or tear H33.309    Aortic stenosis I35.0    Coronal hypospadias Q54.0    Chronic atrial fibrillation ([facility]) I48.2    Chest pain R07.9    Fluid overload E87.70    Tricuspid regurgitation I07.1    Chronic anticoagulation Z79.01    Gout M10.9    Diabetic retinopathy associated with type 2 diabetes mellitus ([facility]) E11.319    Lesion of soft palate K13.79    Uremia N19    Dyspnea R06.00    MINERVA (acute kidney injury) ([facility] N17.9    Pulmonary hypertension ([facility]) I27.20    Troponin level elevated R74.8    Acute kidney injury ([facility] N17.9    Sepsis ([facility]) A41.9            81 yo male admitted for worsening weakness and confusion. CAD - severe multivessel disease, not a candidate for surgical intervention. Severe aortic stenosis - S/p TAVR   Cardiology following. MINERVA - nephrology following, pre renal.    Pulmonary HTN/volume overload - continue on diuresis. Preserved LVF    Coagulopathy - coumadin on hold. Leukocytosis -    blood cultures growing deptheroids, 2/2. Repeat blood cultures   Concern for endocarditis. On vancomycin, ancef and levaquin. Pulmonary following. Dr. Abel Rosales discussed with patient's wife yesterday. No aggressive measures. Patient will poor prognosis. DNR      Disposition : TBD    Review of systems  General: No fevers or chills. Cardiovascular: No chest pain or pressure. No palpitations. Pulmonary: No shortness of breath. Gastrointestinal: No nausea, vomiting.        Physical Exam:  General: Awake, cooperative, no acute distress    HEENT: NC, Atraumatic. PERRLA, anicteric sclerae. Lungs: Decreased breath sounds lower lungs bilaterally. Heart:  Regular  rhythm,  No murmur, No Rubs, No Gallops  Abdomen: Soft, Non distended, Non tender.  +Bowel sounds,   Extremities: No c/c/e  Psych:   Not anxious or agitated. Neurologic:  No acute neurological deficit.            Vital signs/Intake and Output:  Visit Vitals    BP (!) 110/30    Pulse 76    Temp 98.1 °F (36.7 °C)    Resp 17    Ht 5' 11\" (1.803 m)    Wt 119.8 kg (264 lb 1.8 oz)    SpO2 100%    BMI 36.84 kg/m2     Current Shift:     Last three shifts:  05/08 1901 - 05/10 0700  In: 3076.3 [P.O.:2260; I.V.:816.3]  Out: -             Labs: Results:       Chemistry Recent Labs      05/10/18   0332  05/09/18   0327  05/08/18   0230   GLU  118*  157*  145*   NA  135*  135*  133*   K  4.6  4.4  5.0   CL  101  100  94*   CO2  26  24  28   BUN  109*  111*  109*   CREA  2.10*  2.43*  2.55*   CA  8.7  8.2*  9.2   AGAP  8  11  11   BUCR  52*  46*  43*   AP   --    --   102   TP   --    --   6.7   ALB   --    --   2.6*   GLOB   --    --   4.1*   AGRAT   --    --   0.6*      CBC w/Diff Recent Labs      05/09/18 0327 05/08/18   0806  05/08/18   0230   WBC  21.0*  26.4*  16.2*   RBC  3.73*  3.93*  4.36*   HGB  11.2*  11.9*  13.3   HCT  34.1*  35.7*  39.7   PLT  103*  147  154   GRANS  70  83*  93*   LYMPH  10*  8*  4*   EOS  0  0  0      Cardiac Enzymes Recent Labs      05/08/18   1735  05/08/18   1339   CPK  37*  39   CKND1  CALCULATION NOT PERFORMED WHEN RESULT IS BELOW LINEAR LIMIT  6.4*      Coagulation Recent Labs      05/10/18   0332  05/09/18   0917  05/09/18   0327  05/08/18   2345   PTP  51.1*   --   48.9*   --    INR  5.8*   --   5.5*   --    APTT   --   >180.0*   --   >180.0*       Lipid Panel No results found for: CHOL, CHOLPOCT, CHOLX, CHLST, CHOLV, 883918, HDL, LDL, LDLC, DLDLP, 289871, VLDLC, VLDL, TGLX, TRIGL, TRIGP, TGLPOCT, CHHD, CHHDX   BNP No results for input(s): BNPP in the last 72 hours.    Liver Enzymes Recent Labs      05/08/18   0230   TP  6.7   ALB  2.6*   AP  102   SGOT  53*      Thyroid Studies No results found for: T4, T3U, TSH, TSHEXT, TSHEXT     Procedures/imaging: see electronic medical records for all procedures/Xrays and details which were not copied into this note but were reviewed prior to creation of Plan

## 2018-05-10 NOTE — INTERDISCIPLINARY ROUNDS
Interdisciplinary Round Note   Patient Information: Mat Sanz                                      339/01 Reason for Admission: Uremia  MINERVA (acute kidney injury) ([address]  CHF (congestive heart failure) (HCC)  Dyspnea  Acute kidney injury ([address]  Troponin level elevated  Pulmonary hypertension ([address]  Quality Measure: SEPSIS            Attending Provider:   Ashvin Payton MD  Primary Care Physician:       Nu Guzman MD       [phone]  Consulting:  IP CONSULT TO NEPHROLOGY  IP CONSULT TO NEPHROLOGY  IP CONSULT TO PALLIATIVE CARE - PROVIDER  IP CONSULT TO CARDIOLOGY  IP CONSULT TO PULMONOLOGY  IDR Rounding Physician:  Past Medical History:   Past Medical History:   Diagnosis Date    Aortic stenosis 9/29/2017    Bilateral artificial lens implant 1997, 1999    Chronic anticoagulation 9/29/2017    Chronic atrial fibrillation ([address] 9/29/2017    Coronal hypospadias 9/29/2017    Coronary artery disease involving native coronary artery of native heart without angina pectoris 9/28/2017    Diabetes mellitus with no complication ([address] 0/08/6861    Last Assessment & Plan:  No apparent diabetic retinopathy. . Pt told to keep regular primary care and eye appointments to reduce the risk of visual loss from diabetic eye complications, and to follow up immediately for changes in vision.  Diabetic retinopathy associated with type 2 diabetes mellitus ([address] 9/29/2017    Fluid overload     Gout 9/29/2017    Lesion of soft palate 7/29/2017    ENT planned biopsy    Pseudophakia of both eyes 9/29/2017    Last Assessment & Plan:  Vision stable. IOL's well centered. Pt pleased with visual result post-op OD 1999 and OS 1997. To call if any visual changes.  Retinal hole or tear 6/12/2017    Last Assessment & Plan:  [facility] well surrounded. Stable.   Will follow    Tricuspid regurgitation 9/29/2017        Hospital day: 2                          4d 7h  Estimated discharge date:   RRAT Score: High Risk            25       Total Score        3 Has Seen PCP in Last 6 Months (Yes=3, No=0)    2 . Living with Significant Other. Assisted Living. LTAC. SNF. or   Rehab    4 IP Visits Last 12 Months (1-3=4, 4=9, >4=11)    5 Pt. Coverage (Medicare=5 , Medicaid, or Self-Pay=4)    11 Charlson Comorbidity Score (Age + Comorbid Conditions)        Criteria that do not apply:    Patient Length of Stay (>5 days = 3)         Primary Goals for Today: monitor B/p    Secondary Goals for Today: monitor b/p    Overnight Events: none    Allison: no    Central Line: no    Isolation: no       IV Antibiotics?  yes       When started: 5/8/2018  Current Medication List:          Current Facility-Administered Medications   Medication Dose Route Frequency    insulin lispro (HUMALOG) injection   SubCUTAneous AC&HS    glucose chewable tablet 16 g  4 Tab Oral PRN    glucagon (GLUCAGEN) injection 1 mg  1 mg IntraMUSCular PRN    dextrose (D50W) injection syrg 12.5-25 g  25-50 mL IntraVENous PRN    atorvastatin (LIPITOR) tablet 40 mg  40 mg Oral DAILY    vancomycin (VANCOCIN) 1500 mg in  ml infusion  1,500 mg IntraVENous Q24H    aspirin delayed-release tablet 81 mg  81 mg Oral DAILY    carvedilol (COREG) tablet 3.125 mg  3.125 mg Oral BID WITH MEALS    loratadine (CLARITIN) tablet 10 mg  10 mg Oral DAILY    ferrous sulfate tablet 325 mg  325 mg Oral ACB    acetaminophen (TYLENOL) tablet 650 mg  650 mg Oral Q4H PRN    levoFLOXacin (LEVAQUIN) 750 mg in D5W IVPB  750 mg IntraVENous Q48H    Vancomycin-Rx to Dose & Monitor  1 Each Other Rx Dosing/Monitoring    ceFAZolin (ANCEF) 1 g in sterile water (preservative free) 10 mL IV syringe  1 g IntraVENous Q8H    acetaminophen (TYLENOL) suppository 650 mg  650 mg Rectal Q6H PRN    morphine (ROXANOL) 100 mg/5 mL (20 mg/mL) concentrated solution 10 mg  10 mg SubLINGual Q3H PRN    LORazepam (ATIVAN) injection 0.5 mg  0.5 mg IntraVENous Q6H PRN      VTE Prophylaxis Lines, Drains, & Airways  Peripheral IV 05/08/18 Right Antecubital-Site Assessment: Clean, dry, & intact  Peripheral IV 05/08/18 Left Hand-Site Assessment: Clean, dry, & intact     Vital signs:  Visit Vitals    /54    Pulse 75    Temp 98.5 °F (36.9 °C)    Resp 17    Ht 5' 11\" (1.803 m)    Wt 119.8 kg (264 lb 1.8 oz)    SpO2 97%    BMI 36.84 kg/m2        Intake and Output:   05/08 1901 - 05/10 0700  In: 3076.3 [P.O.:2260; I.V.:816.3]  Out: -         Stool Color: Brown  Stool Appearance: Soft  Stool Amount: Small  Stool Source/Status: Rectum     Current Diet: DIET SNACKS Breakfast, Lunch, Dinner; Magic Cup  DIET CARDIAC Mechanical Soft; Consistent Carb 2000kcal; 1 NECTAR       Abdominal   Stool Appearance: Soft  Abdominal Assessment: Obese, Intact  Appetite: Poor  Bowel Sounds: Active   Nutrition  Chewing/Swallowing Problems: No  Difficulty with Secretions: No  Speech Slurred/Thick/Garbled: No    NGT: no    Feeding Tube: no   Recent Glucose Results:   Lab Results   Component Value Date/Time     (H) 05/10/2018 03:32 AM    GLUCPOC 136 (H) 05/10/2018 11:40 AM    GLUCPOC 112 (H) 05/10/2018 05:55 AM    GLUCPOC 185 (H) 05/09/2018 09:06 PM    GI Prophylaxis: yes        Type: oral          Activity Level: Activity Level: Bed Rest    Needs assistance with ADLs: yes  PT Consult Status: no  Equipment:   Surgical/Ortho Notes:    Current Immunizations: There is no immunization history on file for this patient.   RRAT Score:     Readmit Risk Tool  Support Systems: Spouse/Significant Other/Partner  Relationship with Primary Physician Group: Seen at least one time within the past 6 months   Recommendations:       Discharge Disposition: TBD, pending progress    Needs for Discharge:            Recommendations from IDR team: possible comfort measures    Other Notes:

## 2018-05-10 NOTE — PROGRESS NOTES
9847: Assumed care of pt from [address]. 0930: pt resting and cleaned up oral care was preformed. 1145: rounded with DrPatricia And team. Gloria Gaspar about moving towards hospice care palliative to meet with family. 1320: Wife of pt called stated she will not be here today to see pt today. Did ask if Dr. Nazario Rhodes can call her to update her on his prognosis. This RN spoke with  Dexter Duane and Dexter Duane stated she will call wife and have Dr. Nazario Rhodes call pt wife as well. [address]: Spoke with Dr. Barbara Eaton regarding pt status. That prognosis does not look good. Dr. Barbara Eaton spoke with wife regarding pt status and that comfort measures would be best for pt. Wife agreed. This RN called wife to confirm her wishes. Wife did state that she did want to make patient comfort measures only. This RN spoke with Dr. Nazario Rhodes he is aware. Dr. Barbara Eaton is placing orders for pt to be comfort care since Dr. Nazario Rhodes is away from hospital.  1800: pt had uneventful shift and is resting comfortably.

## 2018-05-10 NOTE — PROGRESS NOTES
NUTRITION UPDATE    -Per request from [employer]-Add Magic Cup TID          Analy Chong, [address] [place] [address]  PAGER:  [phone]

## 2018-05-10 NOTE — PROGRESS NOTES
Met with patient at bedside during IDRs with MD Goran Echavarria and primary RN Norma.  No family present for rounds. MD Goran Echavarria discuss plan of care. Approached patient about hospice. CM will reach out to palliative care team and pts spouse. 4131  Discussed plan of care with pts wife, Parker Montemayor. Discussed possibility of hospice. Pts wife states she cannot bring pt home with hospice. Explained possibility of going to a facility, but made her aware room/board would be out of pocket cost.  Pts wife asked to discuss plan with MD.  MD Goran Echavarria made aware and will contact wife.     Name Relation Home Work [phone]

## 2018-05-11 NOTE — PROGRESS NOTES
Bedside and Verbal shift change report given to Winston Kaur RN (oncoming nurse) by ELTON Grey RN (offgoing nurse). Report included the following information SBAR, Kardex, Intake/Output, MAR and Recent Results.

## 2018-05-11 NOTE — PROGRESS NOTES
NUTRITION update    ASSESSMENT/PLAN:     Current Diet: [facility] soft janet [address] [employer] [facility]    Chart reviewed, note change in goals of care now focused on comfort measures only. Aggressive nutrition intervention is not indicated at this time. Will assist as needed; please consult if nutrition intervention is medically indicated and consistent with patient's goals of care.       Mago Mckay RD  Pager:  [phone]

## 2018-05-11 NOTE — HOSPICE
Hospice information call. Telephone call to the patient's wife Parker Montemayor to explain hospice services. . Explained the hospice philosophy, IDT, benefits. She explained that the patient is heavy and was able to transfer himself and assist her with bed mobility recently but he is no longer able to. He is bed bound. She does not tlhink that she can handle taking care of the patient at home. There's no family or friends to assist her. She stated that it has taken 2 people to turn and clean the patient when he is incontinent or for bathing since he has been in the hospital.  Parker Montemayor also stated that she doesn't think that she can emotionally handle the patient \"passing in a home where she has to continue to live in\". Provided her with active listening and support and contact information.

## 2018-05-11 NOTE — PROGRESS NOTES
Hospitalist Progress Note    Patient: Maryann Marin MRN: [id number]  CSN: [id number]    YOB: 1950  Age: 80 y.o. Sex: male    DOA: 5/8/2018 LOS:  LOS: 3 days                Assessment/Plan     Patient Active Problem List   Diagnosis Code    Coronary artery disease involving native coronary artery of native heart without angina pectoris I25.10    Diabetes mellitus with no complication ([facility]) M96.4    Pseudophakia of both eyes Z96.1    Retinal hole or tear H33.309    Aortic stenosis I35.0    Coronal hypospadias Q54.0    Chronic atrial fibrillation ([facility]) I48.2    Chest pain R07.9    Fluid overload E87.70    Tricuspid regurgitation I07.1    Chronic anticoagulation Z79.01    Gout M10.9    Diabetic retinopathy associated with type 2 diabetes mellitus ([facility]) E11.319    Lesion of soft palate K13.79    Uremia N19    Dyspnea R06.00    MINERVA (acute kidney injury) ([facility] N17.9    Pulmonary hypertension ([facility]) I27.20    Troponin level elevated R74.8    Acute kidney injury ([facility] N17.9    Sepsis ([facility]) A41.9            81 yo male admitted for worsening weakness and confusion. Sepsis - present on arrival.    CAD - severe multivessel disease, not a candidate for surgical intervention. Severe aortic stenosis - S/p TAVR   Cardiology following. MINERVA - nephrology following, pre renal.    Pulmonary HTN/volume overload - continue on diuresis. Preserved LVF    Coagulopathy - coumadin on hold. Leukocytosis -    blood cultures growing deptheroids, 2/2. Repeat blood cultures   Concern for endocarditis. On vancomycin, ancef and levaquin. Pulmonary following. Discussed with patient's wife . Patient with poor prognosis. Wife wants comfort measures. Transfer to medical floor. DNR  Plan to discharge home with hospice. Disposition : TBD           Physical Exam:  General: Awake,in resp distress.   HEENT: NC, Atraumatic. PERRLA, anicteric sclerae.   Lungs: Decreased breath sounds lower lungs bilaterally. Heart:  Regular  rhythm,  No murmur, No Rubs, No Gallops  Abdomen: Soft, Non distended, Non tender.  +Bowel sounds,   Extremities: No c/c/e          Vital signs/Intake and Output:  Visit Vitals    /81 (BP 1 Location: Right arm, BP Patient Position: At rest;Supine)    Pulse 93    Temp 98.1 °F (36.7 °C)    Resp 18    Ht 5' 11\" (1.803 m)    Wt 54.1 kg (119 lb 3.2 oz)    SpO2 93%    BMI 16.63 kg/m2     Current Shift:  05/11 0701 - 05/11 1900  In: 100 [P.O.:100]  Out: -   Last three shifts:  05/09 1901 - 05/11 0700  In: 2140 [P.O.:2140]  Out: 350 [Urine:350]            Labs: Results:       Chemistry Recent Labs      05/10/18   0332  05/09/18   0327   GLU  118*  157*   NA  135*  135*   K  4.6  4.4   CL  101  100   CO2  26  24   BUN  109*  111*   CREA  2.10*  2.43*   CA  8.7  8.2*   AGAP  8  11   BUCR  52*  46*      CBC w/Diff Recent Labs      05/09/18   0327   WBC  21.0*   RBC  3.73*   HGB  11.2*   HCT  34.1*   PLT  103*   GRANS  70   LYMPH  10*   EOS  0      Cardiac Enzymes Recent Labs      05/08/18   1735  05/08/18   1339   CPK  37*  39   CKND1  CALCULATION NOT PERFORMED WHEN RESULT IS BELOW LINEAR LIMIT  6.4*      Coagulation Recent Labs      05/11/18   0417  05/10/18   0332  05/09/18   0917   05/08/18   2345   PTP  49.7*  51.1*   --    < >   --    INR  5.6*  5.8*   --    < >   --    APTT   --    --   >180.0*   --   >180.0*    < > = values in this interval not displayed. Lipid Panel No results found for: CHOL, CHOLPOCT, CHOLX, CHLST, CHOLV, 568539, HDL, LDL, LDLC, DLDLP, 437662, VLDLC, VLDL, TGLX, TRIGL, TRIGP, TGLPOCT, CHHD, CHHDX   BNP No results for input(s): BNPP in the last 72 hours. Liver Enzymes No results for input(s): TP, ALB, TBIL, AP, SGOT, GPT in the last 72 hours.     No lab exists for component: DBIL   Thyroid Studies No results found for: T4, T3U, TSH, TSHEXT, TSHEXT     Procedures/imaging: see electronic medical records for all procedures/Xrays and details which were not copied into this note but were reviewed prior to creation of Plan

## 2018-05-11 NOTE — ROUTINE PROCESS
0730 Assumed care of patient from [facility]. 1800 Patient had a uneventful shift.

## 2018-05-11 NOTE — ROUTINE PROCESS
Bedside and Verbal shift change report given to ELTON Jimenez RN (oncoming nurse) by Tunde Moreno RN (offgoing nurse). Report included the following information SBAR, Kardex, Intake/Output, MAR and Recent Results.

## 2018-05-11 NOTE — PROGRESS NOTES
2910: Lab called with a critical value for the patient. His INR was 5.6. The patient's primary nurse, ELTON Laird, was not available at that time. This nurse took the critical result, informed ELTON Munson R.N., then documented the result in the critical flow sheet.

## 2018-05-11 NOTE — PROGRESS NOTES
Attempted to call pts wife, Jassi Mooney @ [phone] and # disconnected. Called and lm for wife @ [phone] to discuss discharge plan. It is noted pt is on comfort measures. Plan is to again discuss hospice option with pts spouse. CM will cont to follow. 1045  Received return call from pts wife. She cont to refuse to bring pt home and states \"the death process has begun\". She would like to meet with MD Steven Reed and CM. States she plans to come to hospital at 1230pm.  MD Steven Reed made aware. Message left for palliative care. 80  Met with patient and pts wife at bedside to discuss goals of care. She agrees on comfort measures, but is hesitant to bring pt home. She is concerned about caring for patient. CM made her aware of her options (dc with hospice at home vs hospice at a facility private pay vs hospice at home w/ hiring private pay caregivers). She agreed to speak with hospice to have questions addressed. [facility] [phone] called and made aware of request and will have someone call pts wife.

## 2018-05-11 NOTE — PROGRESS NOTES
Informed that a critical INR of 5.6 was taken by BRANDON Uribe RN at 1358 while I was unavailable. Reece Hughes documented the critical results in the critical results flow sheet. Yesterday's critical INR of 5.8 was called to Dr. Srini Melendez and no orders were received at this time. MD's are aware of the elevated INR and patient's coumadin is on hold. Patient is comfort measures. Shift Summary:  Patient spent uneventful shift. No issues noted. See flow sheet and MAR.

## 2018-05-11 NOTE — ROUTINE PROCESS
Bedside and Verbal shift change report given to BRANDON Gutierrez (oncoming nurse) by Lillian Steele (offgoing nurse). Report included the following information SBAR, Kardex, Intake/Output, MAR, Recent Results and Cardiac Rhythm AFib BBB.

## 2018-05-11 NOTE — PROGRESS NOTES
Problem: Dysphagia (Adult)  Goal: *Acute Goals and Plan of Care (Insert Text)  Recommendations:  Diet: Mech-soft/Nectar-thick liquid   Meds: Per patient preference  Aspiration Precautions  Oral Care TID    Goals:  Patient will:  1. Tolerate PO trials with 0 s/s overt distress in 4/5 trials - goal met  2. Utilize compensatory swallow strategies/maneuvers (decrease bite/sip, size/rate, alt. liq/sol) with min cues in 4/5 trials - goal met  3. Perform oral-motor/laryngeal exercises to increase oropharyngeal swallow function with min cues - N/A  4. Complete an objective swallow study (i.e., MBSS) to assess swallow integrity, r/o aspiration, and determine of safest LRD, min A - N/A        Outcome: Resolved/Met Date Met: 05/11/18  Speech language pathology dysphagia treatment & discharge    Patient: Gina Cowan (74 y.o. male)  Date: 5/11/2018  Diagnosis: Uremia  MINERVA (acute kidney injury) ([facility]  CHF (congestive heart failure) ([facility])  Dyspnea  Acute kidney injury ([facility] [address]  Troponin level elevated  Pulmonary hypertension (HCC) MINERVA (acute kidney injury) ([facility] [address] [facility]       Precautions: Aspiration    PLOF: Living with family    ASSESSMENT:  Followed up this day with dysphagia tx. Pt A&O to self. CNA at bedside. Pt accepted pudding (Magic Cup) and nectar-thick liquid +/- straw with delayed propulsion and swallow initiation, however 0 clinical s/s of aspiration. Provided oral care following PO. Pt remains safest for mech-soft, NTL diet, however change in plan of care; now comfort measures. Pt remains an aspiration risk, therefore aspiration precautions should be continued. Skilled speech therapy is not indicated at this time. SLP available to re-consult if dysphagia intervention is medically indicated. SLP will discharge accordingly.      Progression toward goals:  []         Improving appropriately - goals met/approximated  [x]         Not making progress/Not appropriate - will d/c POC     PLAN:  Recommendations and Planned Interventions:  Maximum therapeutic gains met; safest, least restrictive diet achieved. D/C ST intervention at this time. Discharge Recommendations:  None     SUBJECTIVE:   Patient stated Danielle Brewstergartner. OBJECTIVE:   Cognitive and Communication Status:  Neurologic State: Alert  Orientation Level: Oriented to person, Disoriented to place, Disoriented to situation, Disoriented to time  Cognition: Decreased attention/concentration, Follows commands  Perception: Appears intact  Perseveration: No perseveration noted  Safety/Judgement: Decreased insight into deficits  Dysphagia Treatment:  Oral Assessment:  Oral Assessment  Labial: No impairment  Dentition: Natural, Intact  Oral Hygiene: Fair  Lingual: Decreased rate  Velum: No impairment  Mandible: No impairment  P.O. Trials:   Patient Position: [facility]   Vocal quality prior to P.O.: No impairment   Consistency Presented: Nectar thick liquid, Pudding   How Presented: SLP-fed/presented, Straw, Successive swallows, Cup/sip       Bolus Acceptance: No impairment   Bolus Formation/Control: No impairment   Type of Impairment: Delayed, Mastication   Propulsion: Delayed (# of seconds)   Oral Residue: None   Initiation of Swallow: Delayed (# of seconds)   Laryngeal Elevation: Functional   Aspiration Signs/Symptoms: Weak cough, Clear throat   Pharyngeal Phase Characteristics: Suspected pharyngeal residue   Effective Modifications: Small sips and bites   Cues for Modifications:  Moderate         Oral Phase Severity: Mild   Pharyngeal Phase Severity : Moderate     PAIN:  Start of Tx: 0  End of Tx: 0     GCODESwallowing:  Swallow Current Status CK= 40-59%   Swallow Goal Status CK= 40-59%   Swallow D/C Status CK= 40-59%    The severity rating is based on the following outcomes:  GRIS Noms Swallow Level 4    Clinical Judgement    After treatment:   []              Patient left in no apparent distress sitting up in chair  [x]              Patient left in no apparent distress in bed  [x]              Call bell left within reach  [x]              Nursing notified  []              Caregiver present  []              Bed alarm activated      COMMUNICATION/EDUCATION:   [x] Aspiration precautions; swallow safety; compensatory techniques  [x]        Patient unable to participate in education; education ongoing with staff  [x]  Posted safety precautions in patient's room.   [] Oral-motor/laryngeal strengthening exercises    Janelle Curtis, SLP  Time Calculation: 23 mins

## 2018-05-12 NOTE — PROGRESS NOTES
Hospitalist Progress Note    Patient: Cory Fernández MRN: [id number]  CSN: [id number]    YOB: 1950  Age: 80 y.o. Sex: male    DOA: 5/8/2018 LOS:  LOS: 4 days          Chief Complaint: comfort care        Assessment/Plan       Disposition :  Patient Active Problem List   Diagnosis Code    Coronary artery disease involving native coronary artery of native heart without angina pectoris I25.10    Diabetes mellitus with no complication ([facility]) J83.8    Pseudophakia of both eyes Z96.1    Retinal hole or tear H33.309    Aortic stenosis I35.0    Coronal hypospadias Q54.0    Chronic atrial fibrillation ([facility]) I48.2    Chest pain R07.9    Fluid overload E87.70    Tricuspid regurgitation I07.1    Chronic anticoagulation Z79.01    Gout M10.9    Diabetic retinopathy associated with type 2 diabetes mellitus ([facility]) E11.319    Lesion of soft palate K13.79    Uremia N19    Dyspnea R06.00    MINERVA (acute kidney injury) ([facility] N17.9    Pulmonary hypertension ([facility]) I27.20    Troponin level elevated R74.8    Acute kidney injury ([facility] N17.9    Sepsis ([facility] A41.9       -Continue comfort measures.   -Hospice being explored. Seems like family not amenable  to home hospice.  -Unsure what inpatient hospice options are present. Subjective:    Comfort care. Seen at bedside with JACINTO Lopez. Doing well. Comfortable. Vital signs/Intake and Output:  Visit Vitals    /76 (BP 1 Location: Right arm, BP Patient Position: At rest)    Pulse 79    Temp 98.1 °F (36.7 °C)    Resp 18    Ht 5' 11\" (1.803 m)    Wt 54.1 kg (119 lb 3.2 oz)    SpO2 100%    BMI 16.63 kg/m2     Current Shift:     Last three shifts:  05/10 1901 - 05/12 0700  In: 1180 [P.O.:1180]  Out: 350 [Urine:350]    Exam:    General: NAD  CVS:s1s2 rrr  Lungs:Breath sounds b/l. Abdomen: Soft, bs+  Extremities:Some edema.                 Labs: Results:       Chemistry Recent Labs      05/10/18   0332   GLU  118*   NA  135*   K  4.6 CL  101   CO2  26   BUN  109*   CREA  2.10*   CA  8.7   AGAP  8   BUCR  52*      CBC w/Diff No results for input(s): WBC, RBC, HGB, HCT, PLT, GRANS, LYMPH, EOS, HGBEXT, HCTEXT, PLTEXT in the last 72 hours. Cardiac Enzymes No results for input(s): CPK, CKND1, ANA in the last 72 hours. No lab exists for component: CKRMB, TROIP   Coagulation Recent Labs      05/11/18   0417  05/10/18   0332   PTP  49.7*  51.1*   INR  5.6*  5.8*       Lipid Panel No results found for: CHOL, CHOLPOCT, CHOLX, CHLST, CHOLV, 924270, HDL, LDL, LDLC, DLDLP, 855545, VLDLC, VLDL, TGLX, TRIGL, TRIGP, TGLPOCT, CHHD, CHHDX   BNP No results for input(s): BNPP in the last 72 hours. Liver Enzymes No results for input(s): TP, ALB, TBIL, AP, SGOT, GPT in the last 72 hours.     No lab exists for component: DBIL   Thyroid Studies No results found for: T4, T3U, TSH, TSHEXT     Procedures/imaging: see electronic medical records for all procedures/Xrays and details which were not copied into this note but were reviewed prior to creation of Liane Mason MD

## 2018-05-12 NOTE — PROGRESS NOTES
1920: Assumed patient care, he was alert and oriented to person, but disoriented to place, time and situation. Respiratory status was stable on 2L via cannula. Vital signs were stable. MEWS score was a one. Patient denied any nausea vomiting dizziness or anxiety. White board was updated and explained. Bed was locked and in lowest position. Call bell, water and personal belongings were within reach. Patient had no questions, comments or concerns after bedside shift report. 2249: Patient is on comfort care, he asked for and received 10 mg of sublingual Roxanol for pain and 2 mg of IV Ativan for agitation. 2345: Patient was resting quietly with no signs of distress noted. 0430: Patient is on comfort care, he asked for and received 10 mg of sublingual Roxanol for pain and 2 mg of IV Ativan for agitation. 0530: Patient was resting quietly with no signs of distress noted. 0700: Patient had an uneventful shift. Respiratory status, vital signs and MEWS score remained stable. Patient was resting quietly with no signs of distress noted. Bed locked and in lowest position. Call bell water and personal belongings were within reach. Patient had no questions, comments or concerns after bedside shift report.  Bedside report given to Bishop HENRY

## 2018-05-12 NOTE — PROGRESS NOTES
Problem: Falls - Risk of  Goal: *Absence of Falls  Document Fatimah Fall Risk and appropriate interventions in the flowsheet.    Outcome: Progressing Towards Goal  Fall Risk Interventions:  Mobility Interventions: Bed/chair exit alarm    Mentation Interventions: Bed/chair exit alarm    Medication Interventions: Bed/chair exit alarm    Elimination Interventions: Bed/chair exit alarm    History of Falls Interventions: Bed/chair exit alarm

## 2018-05-12 NOTE — ROUTINE PROCESS
Bedside and Verbal shift change report given to Suzanna Philippe RN (oncoming nurse) by Trina Leahy RN   (offgoing nurse). Report included the following information SBAR, Kardex, Intake/Output, MAR and Recent Results.

## 2018-05-12 NOTE — PROGRESS NOTES
Spoke again with pts wife, Mrs Park How about discharge plan. She is now stating she is unable to afford hospice at a facility. She was also offered information on private pay caregivers, which she stated she also could not afford. Although hesitant she states she will allow CM to place referral for [facility]. Per notes, she spoke with Tennille at [facility] yesterday to have questions addressed. Referral has been placed with [facility] [phone]. Will plan for a dc in 24-48hrs if patient is stable enough for discharge home w hospice. Name Relation Home Work [address] [phone]     1640  Spoke with Drew Sethi from [facility]. She stated if wife agrees, possible dc tomorrow. She will call wife today. CM will follow up in morning.

## 2018-05-12 NOTE — PROGRESS NOTES
0730 Assumed responsibility for patient from Sridhar Solo RN    TRANSFER - OUT REPORT:    Verbal report given to David Baxter RN(name) on Inse Smith  being transferred to [address] [place] [address](unit) for routine progression of care       Report consisted of patients Situation, Background, Assessment and   Recommendations(SBAR). Information from the following report(s) SBAR, Kardex and MAR was reviewed with the receiving nurse. Lines:   Peripheral IV 05/08/18 Right Antecubital (Active)   Site Assessment Clean, dry, & intact 5/12/2018  9:24 AM   Phlebitis Assessment 0 5/12/2018  9:24 AM   Infiltration Assessment 0 5/12/2018  9:24 AM   Dressing Status Clean, dry, & intact 5/12/2018  9:24 AM   Dressing Type Transparent 5/12/2018  9:24 AM   Hub Color/Line Status Pink 5/12/2018  9:24 AM   Action Taken Open ports on tubing capped 5/11/2018  7:20 PM   Alcohol Cap Used Yes 5/12/2018  9:24 AM       Peripheral IV 05/08/18 Left Hand (Active)   Site Assessment Clean, dry, & intact 5/11/2018  7:20 PM   Phlebitis Assessment 0 5/11/2018  7:20 PM   Infiltration Assessment 0 5/11/2018  7:20 PM   Dressing Status Clean, dry, & intact 5/11/2018  7:20 PM   Dressing Type Transparent;Tape 5/11/2018  7:20 PM   Hub Color/Line Status Pink;Flushed;Capped 5/11/2018  7:20 PM   Action Taken Open ports on tubing capped 5/11/2018  7:20 PM   Alcohol Cap Used Yes 5/11/2018  7:20 PM        Opportunity for questions and clarification was provided.       Patient transported with:   O2 @ 2 liters  Registered Nurse  [facility]

## 2018-05-12 NOTE — PROGRESS NOTES
Problem: Pressure Injury - Risk of  Goal: *Prevention of pressure injury  Document Angel Scale and appropriate interventions in the flowsheet.    Outcome: Progressing Towards Goal  Pressure Injury Interventions:  Sensory Interventions: Assess changes in LOC    Moisture Interventions: Absorbent underpads    Activity Interventions: Pressure redistribution bed/mattress(bed type)    Mobility Interventions: HOB 30 degrees or less, Pressure redistribution bed/mattress (bed type)    Nutrition Interventions: Document food/fluid/supplement intake    Friction and Shear Interventions: Lift sheet

## 2018-05-13 NOTE — PROGRESS NOTES
Bereavement Note:     responded to the death of Adali Flores, who is a 80 y.o., male, offering Spiritual Care to patient and family, see flow sheets for interventions. On call  paged to attend to family of  patient. Wife Kishan العلي was in [facility] waiting room outside of elevators and  engaged her. She said she did not want to go into the room because he wasn't there. The body is just dust she said: \" absent from the body is to be present with the [address] \"  She was a very strong and knowledgeable Jenean Paradise though not a member of a Religious. She was not a fan of the Religious but a lover of God. [address] home called. Date of Death: 18  Time of Death: 1540    Extended Emergency Contact Information  Primary Emergency Contact: Melissa Dsouza  Address: [address] [place] [address], [facility] [address] Phone: [phone]  Mobile Phone: [phone]  Relation: Spouse                 YES      NO  UNKNOWN  Life Net   []        [x]    []   [employer]   [] [x] []   Medical Examiner  []        [x]  []   Going to [employer]  []        [x] []      Autopsy   []        [x]         []   Sympathy Card  [x]        []  Bereavement Materials  [x]        []           Business Card Provided  [x]        []              Home: [address] [place] [address] will continue to follow family and will provide spiritual care as needed. The Rev[employer]  [address] [facility]  [phone]

## 2018-05-13 NOTE — HOSPICE
Hospice note:  Hospice program explained in depth to wife by phone last night. She is very reluctant to have patient come home and states there is no room in her trailer for hospital equipment. After much discussion she did agree to try to clear a space. Patient is unresponsive and breathing is labored. There is some generalized edema and skin color changes. VS are WNL except for heart rate which is > 100 and irregular and thready. Discussed options with ERICKSON Chaidez and patient to remain here today as equipment cannot be delivered. Hospice to contact primary care physician to determine if he will follow patient in hospice. Dr. Lay Perez advised of findings. Hospice to follow up with wife and CM tomorrow 5/13/18.   Thank you very much for this referral.  Please call hospice at  [phone] if there are other issues with which we can assist.

## 2018-05-13 NOTE — ROUTINE PROCESS
1915 Bedside and Verbal shift change  Received from Nelsy Temple RN (outgoing nurse), to ELTON Wylie (oncoming)  Pt. Is AOX nonverbal. IV SL, Pt. No cues for   pain at this time. Report included the following information SBAR, Kardex, Procedure Summary, Intake/Output, MAR, Recent Lab Results. Will resume care and monitor Pt. Condition. Pt. Educated on call bell when in need of help and assistance. Pt. Unable to comprehend education at this time. .     Pt. Head to toe Assessment Done and documented. 2030  RR @22    2200  Pt. Resting in bed comfortably. 2310  Pt. Given partial bath, maritza care and incontinent care. 2315 Given morphine oral prn.    0130  RR @ 22 eyes closed awaken per touch/pain. 0330  Pt. Resting comfortably, not in distress. 0530  Pt. Able to rest well throughout the shift.    0600  Pt. Change pads, incontinent care done. 3496 Given morphine per MAR.    0713  Pt. In bed, breathing heavily RR@ 24. Verbal and bedside Shift changed report given to India Castanon RN (oncoming RN) on Pt. Condition. Report consisted of patients Situation, History, Activities, intake/output,  Background, Assessment and Recommendations(SBAR). Information from the following report(s) Kardex, order Summary, Lab results and MAR was reviewed with the receiving nurse. Opportunity for questions and clarification was provided.

## 2018-05-13 NOTE — PROGRESS NOTES
Shift summary:     Patient passed at 1, Victor Hugo BRYAN, and spouse notified. [facility] per protocol and report of death completed. [employer] spoke with spouse at hospital. Security escorted patient along with representative to [facility].

## 2018-05-13 NOTE — PROGRESS NOTES
Called Maria L [phone] to have on call nurse paged to discuss case. [address] with Teena from [employer] She will come to hospital today to assess patient. States pts wife still hesitant, but is clearing space in home for hospital bed. Per Shani Cabral, she will need to get in touch with pts PCP prior to discharge. Per Shani Cabral, she states discharge home with hospice tomorrow is possible. [facility] Candis RN to bedside to assess patient. Will work toward discharge home with hospice tomorrow, if patient is stable. 1900 Ruth with pts wife, Mrs Delisa Morton. She agrees to discharge tomorrow if patient stable, but states \"I feel like you are forcing me. \"  Reinforced to her that we would not transport if pt not stable to do so. Answered her questions. She verbalized understanding of dc plan.     Name Relation Home Work [address] [phone]

## 2018-05-14 NOTE — CONSULTS
[facility]  MR#: [id number]  : 1950  ACCOUNT #: [de-identified]   DATE OF SERVICE: 2018    ADDENDUM:    DIAGNOSIS:  Acute on chronic diastolic heart failure.       Harjeet Javier MD MA /   D: 2018 17:05     T: 2018 17:27  JOB #: [id number]

## 2018-05-30 NOTE — DISCHARGE SUMMARY
Discharge Summary    Patient: Desiree David MRN: [id number]  CSN: [id number]    YOB: 1950  Age: 80 y.o. Sex: male    DOA: 5/8/2018 LOS:  LOS: 5 days   Discharge Date: 5/13/2018     Primary Care Provider:  Loretta Rendon MD    Admission Diagnoses: Uremia  MINERVA (acute kidney injury) ([facility]  CHF (congestive heart failure) ([facility]  Dyspnea  Acute kidney injury ([facility]  Troponin level elevated  Pulmonary hypertension ([facility]    Discharge Diagnoses:    Problem List as of 5/13/2018  Date Reviewed: 5/8/2018          Codes Class Noted - Resolved    Sepsis ([facility] ICD-10-CM: A41.9  ICD-9-CM: 038.9, 995.91  5/10/2018 - Present        Uremia ICD-10-CM: N19  ICD-9-CM: 405  5/8/2018 - Present        Dyspnea ICD-10-CM: R06.00  ICD-9-CM: 786.09  5/8/2018 - Present        * (Principal)MINERVA (acute kidney injury) ([facility] ICD-10-CM: N17.9  ICD-9-CM: 584.9  5/8/2018 - Present        Pulmonary hypertension ([facility] ICD-10-CM: I27.20  ICD-9-CM: 416.8  5/8/2018 - Present        Troponin level elevated ICD-10-CM: R74.8  ICD-9-CM: 790.6  5/8/2018 - Present        Acute kidney injury ([facility] ICD-10-CM: N17.9  ICD-9-CM: 584.9  5/8/2018 - Present        Pseudophakia of both eyes ICD-10-CM: Z96.1  ICD-9-CM: V43.1  9/29/2017 - Present    Overview Signed 9/29/2017  5:46 AM by Yadira Linder MD     Last Assessment & Plan:   Vision stable. IOL's well centered. Pt pleased with visual result post-op OD 1999 and OS 1997. To call if any visual changes.              Aortic stenosis ICD-10-CM: I35.0  ICD-9-CM: 424.1  9/29/2017 - Present        Coronal hypospadias ICD-10-CM: Q54.0  ICD-9-CM: 752.61  9/29/2017 - Present        Chronic atrial fibrillation (HCC) (Chronic) ICD-10-CM: I48.2  ICD-9-CM: 427.31  9/29/2017 - Present        Chest pain ICD-10-CM: R07.9  ICD-9-CM: 786.50  9/29/2017 - Present        Fluid overload ICD-10-CM: E87.70  ICD-9-CM: 276.69  9/29/2017 - Present        Tricuspid regurgitation ICD-10-CM: I07.1  ICD-9-CM: 397.0 9/29/2017 - Present        Chronic anticoagulation (Chronic) ICD-10-CM: Z79.01  ICD-9-CM: V58.61  9/29/2017 - Present        Gout ICD-10-CM: M10.9  ICD-9-CM: 274.9  9/29/2017 - Present        Diabetic retinopathy associated with type 2 diabetes mellitus ([facility] ICD-10-CM: E11.319  ICD-9-CM: 250.50, 362.01  9/29/2017 - Present        Coronary artery disease involving native coronary artery of native heart without angina pectoris (Chronic) ICD-10-CM: I25.10  ICD-9-CM: 414.01  9/28/2017 - Present        Lesion of soft palate ICD-10-CM: K13.79  ICD-9-CM: 528.9  7/29/2017 - Present    Overview Signed 9/29/2017 11:33 AM by Ronni Gallo PA-C     ENT planned biopsy             Diabetes mellitus with no complication ([facility] (Chronic) ICD-10-CM: E11.9  ICD-9-CM: 250.00  6/12/2017 - Present    Overview Signed 9/29/2017  5:46 AM by Dorotha Goldmann, MD     Last Assessment & Plan:   No apparent diabetic retinopathy. . Pt told to keep regular primary care and eye appointments to reduce the risk of visual loss from diabetic eye complications, and to follow up immediately for changes in vision. Retinal hole or tear ICD-10-CM: H33.309  ICD-9-CM: 361.30  6/12/2017 - Present    Overview Signed 9/29/2017  5:46 AM by Dorotha Goldmann, MD     Last Assessment & Plan:   [facility] well surrounded. Stable. Will follow                   Discharge Medications:     Discharge Medication List as of 5/13/2018  6:53 PM          Discharge Condition: Good    Procedures :   None      Admission HPI :   Aleida Avelar is a 80 y.o.   male who has sever pulm [id number], Morbid obesity, CHF  3v cad presents to ER with worsening weakness and confusion   Wife has had to support him and prevent 2 falls   He is also having loss of urine and stool control  With worsening confusion since being Discharge from [facility] post TAVR and femoral artery cut down   Patient has dyspnea with minimal exertion   Recently had bumex adjusted /cut back due to worsening creatine   Creatine last month 1.03 now 2. 55 with relative hyperkalemia   Patient was initially transferred for OHS to [place] but deemed not a canidate due to multiple comorbidities  Troponin today in ER elevated       Hospital Course :     Admitted to the hospital.  No real progress in treating his multiple end stage medical issues. Transitioned  to comfort care.  18 at 15:40. Activity:n/a    Diet:n/a    Follow-up:n/a    Disposition: n/a    Minutes spent on discharge: 30      Labs: Results:       Chemistry No results for input(s): GLU, NA, K, CL, CO2, BUN, CREA, CA, AGAP, BUCR, TBIL, GPT, AP, TP, ALB, GLOB, AGRAT in the last 72 hours. CBC w/Diff No results for input(s): WBC, RBC, HGB, HCT, PLT, GRANS, LYMPH, EOS, HGBEXT, HCTEXT, PLTEXT in the last 72 hours. Cardiac Enzymes No results for input(s): CPK, CKND1, ANA in the last 72 hours. No lab exists for component: CKRMB, TROIP   Coagulation No results for input(s): PTP, INR, APTT in the last 72 hours. No lab exists for component: INREXT    Lipid Panel No results found for: CHOL, CHOLPOCT, CHOLX, CHLST, CHOLV, 549817, HDL, LDL, LDLC, DLDLP, 867768, VLDLC, VLDL, TGLX, TRIGL, TRIGP, TGLPOCT, CHHD, CHHDX   BNP No results for input(s): BNPP in the last 72 hours. Liver Enzymes No results for input(s): TP, ALB, TBIL, AP, SGOT, GPT in the last 72 hours. No lab exists for component: DBIL   Thyroid Studies No results found for: T4, T3U, TSH, TSHEXT         Significant Diagnostic Studies: Us Retroperitoneum Comp    Result Date: 2018  Renal ultrasound History: Chronic renal failure Comparison: No prior studies Technique: Multiple gray scale sonographic images of the kidneys were obtained. Additional color Doppler and the Doppler waveform images were obtained . Findings: The right kidney is normal in echotexture and appearance. 8 x 7 x 9 mm echogenic shadowing focus is present along the lower pole.  The right kidney measures approximately 12.7 cm in length. There is no evidence of hydronephrosis. The left kidney is normal in echotexture and appearance. Focal echogenic shadowing area measuring 2.9 x 1.7 x 0.8 cm is seen along the interpolar region. Additional small lower pole anechoic 2.0 cm cystic-appearing lesion is present. The left kidney measures approximately 12.0 cm in length. There is no evidence of hydronephrosis. The bladder is identified. Ureteral jets are not seen. Bladder is incompletely distended with estimated volume of 84 mL's. Mild diffuse bladder wall thickening is present estimated 6 mm. No discrete bladder lesion is seen. Patient unable to void for assessment of potential postvoid residual.     IMPRESSION: 1. No evidence of hydronephrosis. 2. Echogenic shadowing foci bilaterally suggesting nonobstructive renal calculi. 3. Additional left renal cyst.    Xr Chest Port    Result Date: 5/8/2018  EXAM:Chest X-Ray  History: Respiratory distress Technique:  Portable Frontal View Comparison: 05/08/2018, 09/29/2017 _______________ FINDINGS: Cardiomegaly with findings of bronchovascular congestion and cephalization. Postoperative changes of previous aortic valve repair. Mild diffuse increased hazy interstitial opacities with areas of patchy confluence at both lung bases. No pneumothorax or effusion. Stable osseous structures with previous total right humeral arthroplasty. _______________     IMPRESSION: 1. Cardiomegaly with findings of CHF versus fluid overload and developing edema. Xr Chest Port    Result Date: 5/8/2018  PORTABLE CHEST AT 0234 HOURS: Indication:  Weakness. Technique:  Portable, erect AP view. Comparison:  09/29/2017. CT chest 09/30/2017. Findings:  Lungs are adequately expanded without focal consolidation, pulmonary edema or pneumothorax. Cardiac silhouette is moderately to markedly enlarged, stable. Tortuous thoracic aorta. Degenerative change around the visualized left shoulder.    Right proximal humeral arthroplasty, incompletely imaged. IMPRESSION: No acute cardiopulmonary disease. Stable moderate to marked enlargement of the cardiac silhouette, cardiomegaly and/or pericardial effusion. No results found for this or any previous visit.         CC: Hugh Haywood MD